# Patient Record
Sex: FEMALE | Race: OTHER | HISPANIC OR LATINO
[De-identification: names, ages, dates, MRNs, and addresses within clinical notes are randomized per-mention and may not be internally consistent; named-entity substitution may affect disease eponyms.]

---

## 2017-02-14 ENCOUNTER — APPOINTMENT (OUTPATIENT)
Dept: CARDIOLOGY | Facility: HOSPITAL | Age: 67
End: 2017-02-14

## 2017-02-14 ENCOUNTER — NON-APPOINTMENT (OUTPATIENT)
Age: 67
End: 2017-02-14

## 2017-02-28 ENCOUNTER — APPOINTMENT (OUTPATIENT)
Dept: CV DIAGNOSTICS | Facility: HOSPITAL | Age: 67
End: 2017-02-28

## 2017-03-28 ENCOUNTER — APPOINTMENT (OUTPATIENT)
Dept: CARDIOLOGY | Facility: HOSPITAL | Age: 67
End: 2017-03-28

## 2017-07-19 ENCOUNTER — APPOINTMENT (OUTPATIENT)
Dept: CV DIAGNOSTICS | Facility: HOSPITAL | Age: 67
End: 2017-07-19

## 2017-07-25 ENCOUNTER — APPOINTMENT (OUTPATIENT)
Dept: CARDIOLOGY | Facility: HOSPITAL | Age: 67
End: 2017-07-25

## 2017-07-25 VITALS
DIASTOLIC BLOOD PRESSURE: 90 MMHG | WEIGHT: 195 LBS | BODY MASS INDEX: 31.47 KG/M2 | RESPIRATION RATE: 15 BRPM | OXYGEN SATURATION: 98 % | SYSTOLIC BLOOD PRESSURE: 160 MMHG | HEART RATE: 48 BPM

## 2017-07-25 DIAGNOSIS — R00.1 BRADYCARDIA, UNSPECIFIED: ICD-10-CM

## 2017-10-24 ENCOUNTER — APPOINTMENT (OUTPATIENT)
Dept: CARDIOLOGY | Facility: HOSPITAL | Age: 67
End: 2017-10-24

## 2017-10-24 ENCOUNTER — NON-APPOINTMENT (OUTPATIENT)
Age: 67
End: 2017-10-24

## 2017-10-24 VITALS — SYSTOLIC BLOOD PRESSURE: 178 MMHG | DIASTOLIC BLOOD PRESSURE: 82 MMHG

## 2017-10-24 VITALS
SYSTOLIC BLOOD PRESSURE: 192 MMHG | WEIGHT: 196 LBS | RESPIRATION RATE: 16 BRPM | OXYGEN SATURATION: 98 % | HEART RATE: 63 BPM | DIASTOLIC BLOOD PRESSURE: 95 MMHG

## 2017-11-14 ENCOUNTER — APPOINTMENT (OUTPATIENT)
Dept: CARDIOLOGY | Facility: HOSPITAL | Age: 67
End: 2017-11-14

## 2017-12-05 ENCOUNTER — APPOINTMENT (OUTPATIENT)
Dept: CARDIOLOGY | Facility: HOSPITAL | Age: 67
End: 2017-12-05

## 2017-12-05 VITALS
SYSTOLIC BLOOD PRESSURE: 184 MMHG | BODY MASS INDEX: 31.8 KG/M2 | HEART RATE: 52 BPM | OXYGEN SATURATION: 98 % | RESPIRATION RATE: 15 BRPM | WEIGHT: 197 LBS | DIASTOLIC BLOOD PRESSURE: 76 MMHG

## 2017-12-05 DIAGNOSIS — R73.03 PREDIABETES.: ICD-10-CM

## 2017-12-05 RX ORDER — CARVEDILOL 6.25 MG/1
6.25 TABLET, FILM COATED ORAL TWICE DAILY
Qty: 60 | Refills: 3 | Status: DISCONTINUED | COMMUNITY
Start: 2017-10-24 | End: 2017-12-05

## 2017-12-12 ENCOUNTER — OTHER (OUTPATIENT)
Age: 67
End: 2017-12-12

## 2017-12-12 ENCOUNTER — APPOINTMENT (OUTPATIENT)
Dept: CARDIOLOGY | Facility: HOSPITAL | Age: 67
End: 2017-12-12

## 2017-12-14 ENCOUNTER — OUTPATIENT (OUTPATIENT)
Dept: OUTPATIENT SERVICES | Facility: HOSPITAL | Age: 67
LOS: 1 days | End: 2017-12-14

## 2017-12-14 ENCOUNTER — APPOINTMENT (OUTPATIENT)
Dept: CV DIAGNOSTICS | Facility: HOSPITAL | Age: 67
End: 2017-12-14
Payer: MEDICARE

## 2017-12-14 DIAGNOSIS — I44.7 LEFT BUNDLE-BRANCH BLOCK, UNSPECIFIED: ICD-10-CM

## 2017-12-14 PROCEDURE — 93016 CV STRESS TEST SUPVJ ONLY: CPT | Mod: GC

## 2017-12-14 PROCEDURE — 78452 HT MUSCLE IMAGE SPECT MULT: CPT | Mod: 26

## 2017-12-14 PROCEDURE — 93018 CV STRESS TEST I&R ONLY: CPT | Mod: GC

## 2018-01-16 ENCOUNTER — APPOINTMENT (OUTPATIENT)
Dept: CARDIOLOGY | Facility: HOSPITAL | Age: 68
End: 2018-01-16

## 2018-01-16 VITALS
BODY MASS INDEX: 31.64 KG/M2 | OXYGEN SATURATION: 98 % | WEIGHT: 196 LBS | SYSTOLIC BLOOD PRESSURE: 198 MMHG | DIASTOLIC BLOOD PRESSURE: 74 MMHG | RESPIRATION RATE: 14 BRPM | HEART RATE: 50 BPM

## 2018-01-16 DIAGNOSIS — E78.5 HYPERLIPIDEMIA, UNSPECIFIED: ICD-10-CM

## 2018-01-16 DIAGNOSIS — I10 ESSENTIAL (PRIMARY) HYPERTENSION: ICD-10-CM

## 2018-01-16 DIAGNOSIS — I44.7 LEFT BUNDLE-BRANCH BLOCK, UNSPECIFIED: ICD-10-CM

## 2018-01-16 RX ORDER — CARVEDILOL 6.25 MG/1
6.25 TABLET, FILM COATED ORAL TWICE DAILY
Qty: 60 | Refills: 3 | Status: ACTIVE | COMMUNITY
Start: 2017-10-24 | End: 1900-01-01

## 2018-01-16 RX ORDER — LOSARTAN POTASSIUM 100 MG/1
100 TABLET, FILM COATED ORAL DAILY
Qty: 30 | Refills: 3 | Status: ACTIVE | COMMUNITY
Start: 2017-10-24 | End: 1900-01-01

## 2018-01-16 RX ORDER — HYDROCHLOROTHIAZIDE 25 MG/1
25 TABLET ORAL DAILY
Qty: 30 | Refills: 3 | Status: ACTIVE | COMMUNITY
Start: 2017-10-24 | End: 1900-01-01

## 2018-03-13 ENCOUNTER — APPOINTMENT (OUTPATIENT)
Dept: CARDIOLOGY | Facility: HOSPITAL | Age: 68
End: 2018-03-13

## 2018-04-03 ENCOUNTER — APPOINTMENT (OUTPATIENT)
Dept: CARDIOLOGY | Facility: HOSPITAL | Age: 68
End: 2018-04-03

## 2018-05-02 ENCOUNTER — INPATIENT (INPATIENT)
Facility: HOSPITAL | Age: 68
LOS: 6 days | Discharge: HOME | End: 2018-05-09
Attending: INTERNAL MEDICINE | Admitting: INTERNAL MEDICINE
Payer: MEDICARE

## 2018-05-02 VITALS
OXYGEN SATURATION: 100 % | HEART RATE: 60 BPM | SYSTOLIC BLOOD PRESSURE: 130 MMHG | DIASTOLIC BLOOD PRESSURE: 47 MMHG | TEMPERATURE: 98 F | RESPIRATION RATE: 18 BRPM

## 2018-05-02 DIAGNOSIS — D72.829 ELEVATED WHITE BLOOD CELL COUNT, UNSPECIFIED: ICD-10-CM

## 2018-05-02 DIAGNOSIS — D64.9 ANEMIA, UNSPECIFIED: ICD-10-CM

## 2018-05-02 DIAGNOSIS — M54.2 CERVICALGIA: ICD-10-CM

## 2018-05-02 DIAGNOSIS — Z98.890 OTHER SPECIFIED POSTPROCEDURAL STATES: Chronic | ICD-10-CM

## 2018-05-02 DIAGNOSIS — Z29.9 ENCOUNTER FOR PROPHYLACTIC MEASURES, UNSPECIFIED: ICD-10-CM

## 2018-05-02 DIAGNOSIS — I25.10 ATHEROSCLEROTIC HEART DISEASE OF NATIVE CORONARY ARTERY WITHOUT ANGINA PECTORIS: ICD-10-CM

## 2018-05-02 DIAGNOSIS — D64.89 OTHER SPECIFIED ANEMIAS: ICD-10-CM

## 2018-05-02 DIAGNOSIS — I10 ESSENTIAL (PRIMARY) HYPERTENSION: ICD-10-CM

## 2018-05-02 LAB
ALBUMIN SERPL ELPH-MCNC: 3.9 G/DL — SIGNIFICANT CHANGE UP (ref 3.3–5)
ALP SERPL-CCNC: 75 U/L — SIGNIFICANT CHANGE UP (ref 40–120)
ALT FLD-CCNC: 21 U/L — SIGNIFICANT CHANGE UP (ref 4–33)
ANISOCYTOSIS BLD QL: SLIGHT — SIGNIFICANT CHANGE UP
APPEARANCE UR: CLEAR — SIGNIFICANT CHANGE UP
AST SERPL-CCNC: 32 U/L — SIGNIFICANT CHANGE UP (ref 4–32)
BASOPHILS # BLD AUTO: 0.11 K/UL — SIGNIFICANT CHANGE UP (ref 0–0.2)
BASOPHILS NFR BLD AUTO: 0.7 % — SIGNIFICANT CHANGE UP (ref 0–2)
BASOPHILS NFR SPEC: 0.9 % — SIGNIFICANT CHANGE UP (ref 0–2)
BILIRUB DIRECT SERPL-MCNC: 0.3 MG/DL — HIGH (ref 0.1–0.2)
BILIRUB SERPL-MCNC: 2.9 MG/DL — HIGH (ref 0.2–1.2)
BILIRUB UR-MCNC: NEGATIVE — SIGNIFICANT CHANGE UP
BLASTS # FLD: 0 % — SIGNIFICANT CHANGE UP (ref 0–0)
BLD GP AB SCN SERPL QL: NEGATIVE — SIGNIFICANT CHANGE UP
BLOOD UR QL VISUAL: NEGATIVE — SIGNIFICANT CHANGE UP
BUN SERPL-MCNC: 16 MG/DL — SIGNIFICANT CHANGE UP (ref 7–23)
CALCIUM SERPL-MCNC: 9.1 MG/DL — SIGNIFICANT CHANGE UP (ref 8.4–10.5)
CHLORIDE SERPL-SCNC: 104 MMOL/L — SIGNIFICANT CHANGE UP (ref 98–107)
CO2 SERPL-SCNC: 26 MMOL/L — SIGNIFICANT CHANGE UP (ref 22–31)
COLOR SPEC: SIGNIFICANT CHANGE UP
CREAT SERPL-MCNC: 0.8 MG/DL — SIGNIFICANT CHANGE UP (ref 0.5–1.3)
DAT C3-SP REAG RBC QL: NEGATIVE — SIGNIFICANT CHANGE UP
DAT POLY-SP REAG RBC QL: POSITIVE — SIGNIFICANT CHANGE UP
DIRECT COOMBS IGG: POSITIVE — SIGNIFICANT CHANGE UP
ELUATE ANTIBODY 1: SIGNIFICANT CHANGE UP
EOSINOPHIL # BLD AUTO: 1.06 K/UL — HIGH (ref 0–0.5)
EOSINOPHIL NFR BLD AUTO: 6.4 % — HIGH (ref 0–6)
EOSINOPHIL NFR FLD: 7.5 % — HIGH (ref 0–6)
ERYTHROCYTE [SEDIMENTATION RATE] IN BLOOD: 123 MM/HR — HIGH (ref 4–25)
FERRITIN SERPL-MCNC: 488.2 NG/ML — HIGH (ref 15–150)
FIBRINOGEN PPP-MCNC: 473.6 MG/DL — SIGNIFICANT CHANGE UP (ref 310–510)
FOLATE SERPL-MCNC: 16.1 NG/ML — SIGNIFICANT CHANGE UP (ref 4.7–20)
GIANT PLATELETS BLD QL SMEAR: PRESENT — SIGNIFICANT CHANGE UP
GLUCOSE SERPL-MCNC: 105 MG/DL — HIGH (ref 70–99)
GLUCOSE UR-MCNC: NEGATIVE — SIGNIFICANT CHANGE UP
HAPTOGLOB SERPL-MCNC: < 20 MG/DL — LOW (ref 34–200)
HCT VFR BLD CALC: 18 % — CRITICAL LOW (ref 34.5–45)
HCT VFR BLD CALC: 18.8 % — CRITICAL LOW (ref 34.5–45)
HGB BLD-MCNC: 6.3 G/DL — CRITICAL LOW (ref 11.5–15.5)
HGB BLD-MCNC: 6.5 G/DL — CRITICAL LOW (ref 11.5–15.5)
HYPOCHROMIA BLD QL: SLIGHT — SIGNIFICANT CHANGE UP
IMM GRANULOCYTES # BLD AUTO: 1.13 # — SIGNIFICANT CHANGE UP
IMM GRANULOCYTES NFR BLD AUTO: 6.9 % — HIGH (ref 0–1.5)
IRON SATN MFR SERPL: 191 UG/DL — HIGH (ref 30–160)
IRON SATN MFR SERPL: 264 UG/DL — SIGNIFICANT CHANGE UP (ref 140–530)
KETONES UR-MCNC: NEGATIVE — SIGNIFICANT CHANGE UP
LDH SERPL L TO P-CCNC: 500 U/L — HIGH (ref 135–225)
LEUKOCYTE ESTERASE UR-ACNC: NEGATIVE — SIGNIFICANT CHANGE UP
LYMPHOCYTES # BLD AUTO: 20.7 % — SIGNIFICANT CHANGE UP (ref 13–44)
LYMPHOCYTES # BLD AUTO: 3.42 K/UL — HIGH (ref 1–3.3)
LYMPHOCYTES NFR SPEC AUTO: 18.7 % — SIGNIFICANT CHANGE UP (ref 13–44)
MACROCYTES BLD QL: SLIGHT — SIGNIFICANT CHANGE UP
MCHC RBC-ENTMCNC: 30.5 PG — SIGNIFICANT CHANGE UP (ref 27–34)
MCHC RBC-ENTMCNC: 31.4 PG — SIGNIFICANT CHANGE UP (ref 27–34)
MCHC RBC-ENTMCNC: 34.6 % — SIGNIFICANT CHANGE UP (ref 32–36)
MCHC RBC-ENTMCNC: 35.7 % — SIGNIFICANT CHANGE UP (ref 32–36)
MCV RBC AUTO: 87.9 FL — SIGNIFICANT CHANGE UP (ref 80–100)
MCV RBC AUTO: 88.3 FL — SIGNIFICANT CHANGE UP (ref 80–100)
METAMYELOCYTES # FLD: 0 % — SIGNIFICANT CHANGE UP (ref 0–1)
MICROCYTES BLD QL: SLIGHT — SIGNIFICANT CHANGE UP
MONOCYTES # BLD AUTO: 1.08 K/UL — HIGH (ref 0–0.9)
MONOCYTES NFR BLD AUTO: 6.5 % — SIGNIFICANT CHANGE UP (ref 2–14)
MONOCYTES NFR BLD: 4.7 % — SIGNIFICANT CHANGE UP (ref 2–9)
MYELOCYTES NFR BLD: 0.9 % — HIGH (ref 0–0)
NEUTROPHIL AB SER-ACNC: 66.4 % — SIGNIFICANT CHANGE UP (ref 43–77)
NEUTROPHILS # BLD AUTO: 9.69 K/UL — HIGH (ref 1.8–7.4)
NEUTROPHILS NFR BLD AUTO: 58.8 % — SIGNIFICANT CHANGE UP (ref 43–77)
NEUTS BAND # BLD: 0.9 % — SIGNIFICANT CHANGE UP (ref 0–6)
NITRITE UR-MCNC: NEGATIVE — SIGNIFICANT CHANGE UP
NON-SQ EPI CELLS # UR AUTO: <1 — SIGNIFICANT CHANGE UP
NRBC # FLD: 1.86 — SIGNIFICANT CHANGE UP
NRBC # FLD: 2.28 — SIGNIFICANT CHANGE UP
NRBC FLD-RTO: 11.3 — SIGNIFICANT CHANGE UP
NRBC FLD-RTO: 13.5 — SIGNIFICANT CHANGE UP
OB PNL STL: NEGATIVE — SIGNIFICANT CHANGE UP
OTHER - HEMATOLOGY %: 0 — SIGNIFICANT CHANGE UP
PH UR: 7.5 — SIGNIFICANT CHANGE UP (ref 4.6–8)
PLATELET # BLD AUTO: 384 K/UL — SIGNIFICANT CHANGE UP (ref 150–400)
PLATELET # BLD AUTO: 385 K/UL — SIGNIFICANT CHANGE UP (ref 150–400)
PLATELET COUNT - ESTIMATE: NORMAL — SIGNIFICANT CHANGE UP
PMV BLD: 9.1 FL — SIGNIFICANT CHANGE UP (ref 7–13)
PMV BLD: 9.1 FL — SIGNIFICANT CHANGE UP (ref 7–13)
POLYCHROMASIA BLD QL SMEAR: SLIGHT — SIGNIFICANT CHANGE UP
POTASSIUM SERPL-MCNC: 3.6 MMOL/L — SIGNIFICANT CHANGE UP (ref 3.5–5.3)
POTASSIUM SERPL-SCNC: 3.6 MMOL/L — SIGNIFICANT CHANGE UP (ref 3.5–5.3)
PROMYELOCYTES # FLD: 0 % — SIGNIFICANT CHANGE UP (ref 0–0)
PROT SERPL-MCNC: 7 G/DL — SIGNIFICANT CHANGE UP (ref 6–8.3)
PROT UR-MCNC: NEGATIVE MG/DL — SIGNIFICANT CHANGE UP
RBC # BLD: 2.07 M/UL — LOW (ref 3.8–5.2)
RBC # BLD: 2.13 M/UL — LOW (ref 3.8–5.2)
RBC # FLD: 22.8 % — HIGH (ref 10.3–14.5)
RBC # FLD: 23 % — HIGH (ref 10.3–14.5)
RETICS #: 307 K/UL — HIGH (ref 25–125)
RETICS/RBC NFR: 14.6 % — HIGH (ref 0.5–2.5)
REVIEW TO FOLLOW: YES — SIGNIFICANT CHANGE UP
RH IG SCN BLD-IMP: POSITIVE — SIGNIFICANT CHANGE UP
RH IG SCN BLD-IMP: POSITIVE — SIGNIFICANT CHANGE UP
SMUDGE CELLS # BLD: PRESENT — SIGNIFICANT CHANGE UP
SODIUM SERPL-SCNC: 142 MMOL/L — SIGNIFICANT CHANGE UP (ref 135–145)
SP GR SPEC: 1.01 — SIGNIFICANT CHANGE UP (ref 1–1.04)
SQUAMOUS # UR AUTO: SIGNIFICANT CHANGE UP
TSH SERPL-MCNC: 3.1 UIU/ML — SIGNIFICANT CHANGE UP (ref 0.27–4.2)
UIBC SERPL-MCNC: 73.3 UG/DL — LOW (ref 110–370)
URATE SERPL-MCNC: 7 MG/DL — SIGNIFICANT CHANGE UP (ref 2.5–7)
UROBILINOGEN FLD QL: NORMAL MG/DL — SIGNIFICANT CHANGE UP
VARIANT LYMPHS # BLD: 0 % — SIGNIFICANT CHANGE UP
VIT B12 SERPL-MCNC: 596 PG/ML — SIGNIFICANT CHANGE UP (ref 200–900)
WBC # BLD: 16.49 K/UL — HIGH (ref 3.8–10.5)
WBC # BLD: 16.86 K/UL — HIGH (ref 3.8–10.5)
WBC # FLD AUTO: 16.49 K/UL — HIGH (ref 3.8–10.5)
WBC # FLD AUTO: 16.86 K/UL — HIGH (ref 3.8–10.5)

## 2018-05-02 PROCEDURE — 99223 1ST HOSP IP/OBS HIGH 75: CPT | Mod: GC

## 2018-05-02 PROCEDURE — 71046 X-RAY EXAM CHEST 2 VIEWS: CPT | Mod: 26

## 2018-05-02 PROCEDURE — 86077 PHYS BLOOD BANK SERV XMATCH: CPT

## 2018-05-02 PROCEDURE — 99223 1ST HOSP IP/OBS HIGH 75: CPT | Mod: AI,GC

## 2018-05-02 RX ORDER — LOSARTAN POTASSIUM 100 MG/1
50 TABLET, FILM COATED ORAL DAILY
Qty: 0 | Refills: 0 | Status: DISCONTINUED | OUTPATIENT
Start: 2018-05-02 | End: 2018-05-04

## 2018-05-02 RX ORDER — FENOFIBRATE,MICRONIZED 130 MG
145 CAPSULE ORAL DAILY
Qty: 0 | Refills: 0 | Status: DISCONTINUED | OUTPATIENT
Start: 2018-05-02 | End: 2018-05-09

## 2018-05-02 RX ADMIN — Medication 90 MILLIGRAM(S): at 23:21

## 2018-05-02 NOTE — H&P ADULT - NSHPSOCIALHISTORY_GEN_ALL_CORE
Denies drug use, EtoH use, never smoker. Pt lives with son and . Pt. is retired. Denies drug use, EtoH use, never smoker. Pt lives with son and . Pt. is retired. Born in [place] since 1985.

## 2018-05-02 NOTE — H&P ADULT - ATTENDING COMMENTS
Clinical picture suggestive of hemolytic anemia. D/W Hem consult, will send off all anemia work up, Coom's profile. Will start Prednisone 1mg/kg PO Daily and give 1 unit of PRBC as per Hem rec.

## 2018-05-02 NOTE — H&P ADULT - HISTORY OF PRESENT ILLNESS
68 yo f with PMHx of HTN, CAD, GERD c/o 3 months of left sided ear fullness and tinnitus.  Also c/o few days of bilateral hand and feet numbness and tingling.  Pt also noted fatigue when exerting herself recently.  Pain around left ear radiating to left neck and mid upper back.  No cp, no sob, no headache, no nausea, no vomiting, no weight loss, no trauma, no abd pain, no black stools, no back pain, no bleeding. [place] , [id number]: 66 yo Burkinan speaking female with PMHx of HTN, CAD, GERD c/o 3 months of left sided ear fullness and tinnitus.  Patient was poor historian. Tinnitus was described as a continuous hissing sound that has been getting worse 2 days prior to admission, leading the patient to present to the ED. Patient also complains of 3 months of 8/10, sharp neck pain along the mid-spine radiating down to her hip and 10/10 sharp leg pain for the past three months.  Patient also complains of "being hot" on Monday and Tuesday and c/o few days of bilateral hand and feet numbness and tingling.  Pt also noted fatigue when exerting herself recently.  Pain around left ear radiating to left neck and mid upper back.  No cp, no sob, no headache, no nausea, no vomiting, no weight loss, no trauma, no abd pain, no black stools, no back pain, no bleeding. Patient is an immigrant but did not describe recent travel or sick contacts. Last saw PCP on March 9th (Dr. Giles Bates), patient bloodwork January 2018 was within normal limits.    In ED,Vital Signs Last 24 Hrs  T(C): 36.4 (02 May 2018 12:28), Max: 36.7 (02 May 2018 09:33)  T(F): 97.6 (02 May 2018 12:28), Max: 98.1 (02 May 2018 09:33)  HR: 71 (02 May 2018 12:28) (60 - 71)  BP: 151/58 (02 May 2018 12:28) (130/47 - 151/58)  RR: 18 (02 May 2018 12:28) (18 - 18)  SpO2: 99% (02 May 2018 12:28) (99% - 100%)    Patient was not able to tolerate CT or blood transfusion due to anxiety and fear of contamination of blood.

## 2018-05-02 NOTE — ED PROVIDER NOTE - MEDICAL DECISION MAKING DETAILS
Pt with left ear tinnitus and upper back pain with neuro complaints but normal exam except easy fatigue.  Labs show anemia - transfuse and admit for further workup, discussed with Dr. Jc.

## 2018-05-02 NOTE — H&P ADULT - ASSESSMENT
68 yo Azeri speaking female with PMHx of HTN, CAD, GERD c/o 3 months of left sided ear fullness and tinnitus, found to have anemia on blood work in ED

## 2018-05-02 NOTE — H&P ADULT - PROBLEM SELECTOR PLAN 2
Pt found to have leukocytosis, no s/s of infections, pt afebrile  - will trend cbc, continue to monitor  - if patient spikes fever will obtain blood cultures

## 2018-05-02 NOTE — CONSULT NOTE ADULT - ASSESSMENT
68 yo f with PMHx of HTN, CAD, GERD c/o 4 months of left sided ear pain associated with high pitched ringing. Hematology consulted for anemia.

## 2018-05-02 NOTE — H&P ADULT - PMH
CAD (coronary artery disease)    HTN (Hypertension)    Vertigo CAD (coronary artery disease)    HTN (Hypertension)    Mixed hyperlipidemia    Vertigo

## 2018-05-02 NOTE — ED ADULT NURSE NOTE - PAIN: BODY LOCATION
glasses/Partially impaired: cannot see medication labels or newsprint, but can see obstacles in path, and the surrounding layout; can count fingers at arm's length
generalized

## 2018-05-02 NOTE — ED PROVIDER NOTE - PROGRESS NOTE DETAILS
Hg rechecked at 6.  Pt NEEDS blood transfusion, discussed with pt with 2 different translators.  Pt states she is scared and refusing.  Explained low chance of infection but still not willing.  CT scan cancelled and will admit to medicine.

## 2018-05-02 NOTE — H&P ADULT - NSHPOUTPATIENTPROVIDERS_GEN_ALL_CORE
Dr. Middleton (Cardiology) [phone]  Dr Jc, [place]  Dr. Middleton (Cardiology) [phone]  Dr. Giles Bates MD [phone]

## 2018-05-02 NOTE — H&P ADULT - PROBLEM SELECTOR PLAN 1
Pt found to be anemic to 6.5 from 12.0 on Jan 18, per PCP.  Pt. labs with elevated total bilirubin  - f/u Iron studies, Retic count, LDH, uric acid, fibrinogen, haptoglobin, chelsey, B12, folate, TSH  - Heme consult  - Will hold off on transfusion, given patient HDS, no acute blood loss Pt found to be anemic to 6.5 from 12.0 on Jan 18, per PCP.  Pt. labs with elevated total bilirubin  - f/u Iron studies, Retic count, LDH, uric acid, fibrinogen, haptoglobin, chelsey, B12, folate, TSH  - Heme consult - Prednisone 80mg  - Transfuse goal >7, s/p 1u PRBC

## 2018-05-02 NOTE — H&P ADULT - FAMILY HISTORY
No pertinent family history in first degree relatives Father  Still living? Unknown  Family history of hypertension in father, Age at diagnosis: Age Unknown  Family history of diabetes mellitus in father, Age at diagnosis: Age Unknown  Family history of ischemic heart disease, Age at diagnosis: Age Unknown     Sibling  Still living? Unknown  Family history of breast cancer, Age at diagnosis: Age Unknown  Family history of malignant neoplasm of uterus, Age at diagnosis: Age Unknown

## 2018-05-02 NOTE — ED ADULT TRIAGE NOTE - CHIEF COMPLAINT QUOTE
Through : patient reports weakness and dizziness and noise in her eyes describes as a ringing/ buzzing in her left ear. only PMH of HTN and gastritis.

## 2018-05-02 NOTE — H&P ADULT - NSHPREVIEWOFSYSTEMS_GEN_ALL_CORE
REVIEW OF SYSTEMS:    CONSTITUTIONAL: No weakness, fevers or chills  EYES/ENT: No visual changes;  No vertigo or throat pain   NECK: No pain or stiffness  RESPIRATORY: No cough, wheezing, hemoptysis; No shortness of breath  CARDIOVASCULAR: No chest pain or palpitations  GASTROINTESTINAL: No abdominal or epigastric pain. No nausea, vomiting, or hematemesis; No diarrhea or constipation. No melena or hematochezia.  GENITOURINARY: No dysuria, frequency or hematuria  NEUROLOGICAL: No numbness or weakness  SKIN: No itching, rashes REVIEW OF SYSTEMS:    CONSTITUTIONAL: No fevers or chills, + fatigue  EYES/ENT: No visual changes, no throat pain;  + vertigo, + tinnitus left ear  NECK: + neck pain ,No stiffness  RESPIRATORY: No cough, wheezing, hemoptysis; No shortness of breath  CARDIOVASCULAR: No chest pain or palpitations  GASTROINTESTINAL: No abdominal or epigastric pain. No nausea, vomiting, or hematemesis; No diarrhea or constipation. No melena or hematochezia.  GENITOURINARY: No dysuria, frequency or hematuria  MSK: hip pain with walking  NEUROLOGICAL: No numbness or weakness  SKIN: No itching, rashes

## 2018-05-02 NOTE — CONSULT NOTE ADULT - SUBJECTIVE AND OBJECTIVE BOX
Hematology Consult Note ******INCOMPLETE    HPI:  Used ([id number])  68 yo f with PMHx of HTN, CAD, GERD c/o 4 months of left sided ear pain associated with high pitched ringing. She denies decreased hearing and vision changes. With the tinnitus she notes left sided facial/neck pain. ROS is also notable for lightheadedness on exertion. She denies fever/chills, rash, nausea/vomiting, unintentional weight loss. No recent travel though she does report being diagnosed with malaria when she was living in [place]. No jaundice. Medications reviewed, no OTC meds, herbs or vitamins. She takes HCTZ carvedilol, losartan and omeprazole regularly. She denies a family history of blood disorders.     PAST MEDICAL & SURGICAL HISTORY:  CAD (coronary artery disease)  Vertigo  HTN (Hypertension)  No significant past surgical history      FAMILY HISTORY:  CAD and HTN in her father.      MEDICATIONS  (STANDING):    MEDICATIONS  (PRN):      Allergies    No Known Allergies    Intolerances        SOCIAL HISTORY: denies ETOH or active tobacco use. from [place]    REVIEW OF SYSTEMS:    CONSTITUTIONAL: weakness  EYES/ENT: No visual changes;  left face/ear/neck pain  NECK: pain down left side  RESPIRATORY: No cough, wheezing; No shortness of breath  CARDIOVASCULAR: intermittent chest discomfort  GASTROINTESTINAL: No abdominal or epigastric pain. No nausea, vomiting, or hematemesis; No diarrhea or constipation. No melena or hematochezia.  GENITOURINARY: No dysuria, frequency or hematuria  NEUROLOGICAL: No numbness or weakness  SKIN: No itching, burning, rashes, or lesions   All other review of systems is negative unless indicated above.        T(F): 97.6 (05-02-18 @ 12:28), Max: 98.1 (05-02-18 @ 09:33)  HR: 71 (05-02-18 @ 12:28)  BP: 151/58 (05-02-18 @ 12:28)  RR: 18 (05-02-18 @ 12:28)  SpO2: 99% (05-02-18 @ 12:28)  Wt(kg): --    GENERAL: NAD, well-developed, sitting comfortably in a chair  HEAD:  Atraumatic, Normocephalic  EYES: EOMI, PERRLA, conjunctiva and sclera clear  NECK: Supple, No LAD  CHEST/LUNG: Clear to auscultation bilaterally; No wheeze  HEART: Regular rate and rhythm; No murmurs, rubs, or gallops  ABDOMEN: Soft, Nontender, Nondistended; Bowel sounds present  EXTREMITIES:  2+ Peripheral Pulses, No clubbing, cyanosis, or edema  NEUROLOGY: non-focal  SKIN: No rashes or lesions. No jaundice                          6.5    16.86 )-----------( 384      ( 02 May 2018 12:20 )             18.8       05-02    142  |  104  |  16  ----------------------------<  105<H>  3.6   |  26  |  0.80    Ca    9.1      02 May 2018 10:50    TPro  7.0  /  Alb  3.9  /  TBili  2.9<H>  /  DBili  x   /  AST  32  /  ALT  21  /  AlkPhos  75  05-02 Hematology Consult Note    HPI:  Used ([id number])  66 yo f with PMHx of HTN, CAD, GERD c/o 4 months of left sided ear pain associated with high pitched ringing. She denies decreased hearing and vision changes. With the tinnitus she notes left sided facial/neck pain. ROS is also notable for lightheadedness on exertion. She denies fever/chills, rash, nausea/vomiting, unintentional weight loss. No recent travel though she does report being diagnosed with malaria when she was living in [place]. No jaundice. Medications reviewed, no OTC meds, herbs or vitamins. She takes HCTZ carvedilol, losartan and omeprazole regularly. She denies a family history of blood disorders.     PAST MEDICAL & SURGICAL HISTORY:  CAD (coronary artery disease)  Vertigo  HTN (Hypertension)  No significant past surgical history      FAMILY HISTORY:  CAD and HTN in her father.      MEDICATIONS  (STANDING):    MEDICATIONS  (PRN):      Allergies    No Known Allergies    Intolerances        SOCIAL HISTORY: denies ETOH or active tobacco use. from [place]    REVIEW OF SYSTEMS:    CONSTITUTIONAL: weakness  EYES/ENT: No visual changes;  left face/ear/neck pain  NECK: pain down left side  RESPIRATORY: No cough, wheezing; No shortness of breath  CARDIOVASCULAR: intermittent chest discomfort  GASTROINTESTINAL: No abdominal or epigastric pain. No nausea, vomiting, or hematemesis; No diarrhea or constipation. No melena or hematochezia.  GENITOURINARY: No dysuria, frequency or hematuria  NEUROLOGICAL: No numbness or weakness  SKIN: No itching, burning, rashes, or lesions   All other review of systems is negative unless indicated above.        T(F): 97.6 (05-02-18 @ 12:28), Max: 98.1 (05-02-18 @ 09:33)  HR: 71 (05-02-18 @ 12:28)  BP: 151/58 (05-02-18 @ 12:28)  RR: 18 (05-02-18 @ 12:28)  SpO2: 99% (05-02-18 @ 12:28)  Wt(kg): --    GENERAL: NAD, well-developed, sitting comfortably in a chair  HEAD:  Atraumatic, Normocephalic  EYES: EOMI, PERRLA, conjunctiva and sclera clear  NECK: Supple, No LAD  CHEST/LUNG: Clear to auscultation bilaterally; No wheeze  HEART: Regular rate and rhythm; No murmurs, rubs, or gallops  ABDOMEN: Soft, Nontender, Nondistended; Bowel sounds present  EXTREMITIES:  2+ Peripheral Pulses, No clubbing, cyanosis, or edema  NEUROLOGY: non-focal  SKIN: No rashes or lesions. No jaundice                          6.5    16.86 )-----------( 384      ( 02 May 2018 12:20 )             18.8       05-02    142  |  104  |  16  ----------------------------<  105<H>  3.6   |  26  |  0.80    Ca    9.1      02 May 2018 10:50    TPro  7.0  /  Alb  3.9  /  TBili  2.9<H>  /  DBili  x   /  AST  32  /  ALT  21  /  AlkPhos  75  05-02

## 2018-05-02 NOTE — CONSULT NOTE ADULT - PROBLEM SELECTOR RECOMMENDATION 9
Hb 6.9 with MCV 88. Prior labs wnl (2016). Diagnosis is broad, including hemolysis v issue with production (nucleated RBCs present).  -check hemolysis labs: retic, LDH, haptoglobin. await fractionation of bilirubin.  -check iron studies including ferritin  -will review peripheral smear.  -given ongoing symptoms likely related to her anemia, agree with plan for transfusion. Hb 6.9 with MCV 88. Prior labs wnl (2016). 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  -check iron studies including ferritin  -will review peripheral smear.  -given ongoing symptoms likely related to her anemia, agree with plan for transfusion. the warm AIHA should not preclude an appropriate response Hb 6.9 with MCV 88. Prior labs wnl (2016). 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  - iron studies including ferritin are pending  -will review peripheral smear.  - check HIV, flow cytometry (green top tube)  -given ongoing symptoms likely related to her anemia, agree with plan for transfusion. the warm AIHA should not preclude an appropriate response  - would check CT neck, chest, abd/pelvis to evaluate for underlying malignancy causing the AIHA. would also check BRUNO as a screen for other autoimmune conditions, (also noted to have elevated ESR on admission)  - medications reviewed, none known to be a/w AIHA Hb 6.9 with MCV 88. Prior labs wnl (2016). 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  - iron studies including ferritin are pending  -will review peripheral smear.  - check HIV, flow cytometry (green top tube)  -given ongoing symptoms likely related to her anemia, agree with plan for transfusion. the warm AIHA should not preclude an appropriate response  - would check CT neck, chest, abd/pelvis to evaluate for underlying malignancy causing the AIHA. would also check BRUNO as a screen for other autoimmune conditions, (also noted to have elevated ESR on admission)  - medications reviewed, HCTZ is associated with an AIHA but unclear how common this is on review of the literature (rare case reports).

## 2018-05-02 NOTE — H&P ADULT - NSHPLABSRESULTS_GEN_ALL_CORE
6.5    16.86 )-----------( 384      ( 02 May 2018 12:20 )             18.8         142  |  104  |  16  ----------------------------<  105<H>  3.6   |  26  |  0.80    Ca    9.1      02 May 2018 10:50    TPro  7.0  /  Alb  3.9  /  TBili  2.9<H>  /  DBili  x   /  AST  32  /  ALT  21  /  AlkPhos  75  05-    CAPILLARY BLOOD GLUCOSE          Urinalysis Basic - ( 02 May 2018 10:50 )    Color: PLYEL / Appearance: CLEAR / S.007 / pH: 7.5  Gluc: NEGATIVE / Ketone: NEGATIVE  / Bili: NEGATIVE / Urobili: NORMAL mg/dL   Blood: NEGATIVE / Protein: NEGATIVE mg/dL / Nitrite: NEGATIVE   Leuk Esterase: NEGATIVE / RBC: x / WBC x   Sq Epi: OCC / Non Sq Epi: x / Bacteria: x    < from: Xray Chest 2 Views PA/Lat (18 @ 12:50) >    IMPRESSION:  Clear lungs. No pleural effusions or pneumothorax.    Slightly enlarged appearing cardiac and mediastinal silhouettes.     Trachea midline.    Unremarkable osseous structures.

## 2018-05-02 NOTE — H&P ADULT - PROBLEM SELECTOR PLAN 4
Pt currently HDS stable will hold BP meds in setting of low Hgb  - restart BP meds ( HCTZ, losartan, hydralizine, coreg) as tolerated

## 2018-05-02 NOTE — CONSULT NOTE ADULT - PROBLEM SELECTOR RECOMMENDATION 2
WBC 16.5 with increased monocytes, eosinophils and immature cells. concerning for reactive v primary process

## 2018-05-02 NOTE — ED PROVIDER NOTE - OBJECTIVE STATEMENT
[id number] .  67 yof with hx of HTN c/o 3 months of left sided ear fullness and tinnitus.  Also c/o few days of bilateral hand and feet numbness and tingling.  Pt also noted fatigue when exerting herself recently.  Pain around left ear radiating to left neck and mid upper back.  No cp, no sob, no headache, no nausea, no vomiting, no weight loss, no trauma, no abd pain, no black stools, no back pain, no bleeding.

## 2018-05-02 NOTE — H&P ADULT - NSHPPHYSICALEXAM_GEN_ALL_CORE
Vital Signs Last 24 Hrs  T(C): 36.4 (02 May 2018 12:28), Max: 36.7 (02 May 2018 09:33)  T(F): 97.6 (02 May 2018 12:28), Max: 98.1 (02 May 2018 09:33)  HR: 71 (02 May 2018 12:28) (60 - 71)  BP: 151/58 (02 May 2018 12:28) (130/47 - 151/58)  BP(mean): --  RR: 18 (02 May 2018 12:28) (18 - 18)  SpO2: 99% (02 May 2018 12:28) (99% - 100%)    PHYSICAL EXAM:  GENERAL: NAD, well-developed  HEAD:  Atraumatic, Normocephalic  EYES: EOMI, PERRLA, conjunctiva and sclera clear  NECK: Supple, No JVD  CHEST/LUNG: Clear to auscultation bilaterally; No wheeze  HEART: Regular rate and rhythm; No murmurs, rubs, or gallops  ABDOMEN: Soft, Nontender, Nondistended; Bowel sounds present  EXTREMITIES:  2+ Peripheral Pulses, No clubbing, cyanosis, or edema  PSYCH: AAOx3  NEUROLOGY: non-focal  SKIN: No rashes or lesions Vital Signs Last 24 Hrs  T(C): 36.4 (02 May 2018 12:28), Max: 36.7 (02 May 2018 09:33)  T(F): 97.6 (02 May 2018 12:28), Max: 98.1 (02 May 2018 09:33)  HR: 71 (02 May 2018 12:28) (60 - 71)  BP: 151/58 (02 May 2018 12:28) (130/47 - 151/58)  BP(mean): --  RR: 18 (02 May 2018 12:28) (18 - 18)  SpO2: 99% (02 May 2018 12:28) (99% - 100%)    PHYSICAL EXAM:  GENERAL: NAD, well-developed, amublating well  HEAD:  Atraumatic, Normocephalic  EYES: EOMI, PERRLA, conjunctiva and mild sclera icterus +  NECK: Supple, No JVD  CHEST/LUNG: Clear to auscultation bilaterally; No wheeze  HEART: Regular rate and rhythm; No murmurs, rubs, or gallops  ABDOMEN: Soft, Nontender, Nondistended; Bowel sounds present  EXTREMITIES:  2+ Peripheral Pulses, No clubbing, cyanosis, or edema  PSYCH: AAOx3  NEUROLOGY: non-focal  SKIN: No rashes or lesions Vital Signs Last 24 Hrs  T(C): 36.4 (02 May 2018 12:28), Max: 36.7 (02 May 2018 09:33)  T(F): 97.6 (02 May 2018 12:28), Max: 98.1 (02 May 2018 09:33)  HR: 71 (02 May 2018 12:28) (60 - 71)  BP: 151/58 (02 May 2018 12:28) (130/47 - 151/58)  BP(mean): --  RR: 18 (02 May 2018 12:28) (18 - 18)  SpO2: 99% (02 May 2018 12:28) (99% - 100%)    PHYSICAL EXAM:  GENERAL: NAD, well-developed, amublating well  HEAD:  Atraumatic, Normocephalic  EYES: EOMI, PERRLA, conjunctiva and mild sclera icterus +  NECK: Supple, No JVD  CHEST/LUNG: Clear to auscultation bilaterally; No wheeze  HEART: Regular rate and rhythm; No murmurs, rubs, or gallops  ABDOMEN: Soft, Nontender, Nondistended; Bowel sounds present  EXTREMITIES:  2+ Peripheral Pulses, No clubbing, cyanosis, or edema  PSYCH: AAOx3  NEUROLOGY: non-focal, focal neck tenderness AOx3  SKIN: No rashes or lesions

## 2018-05-02 NOTE — ED ADULT NURSE NOTE - OBJECTIVE STATEMENT
Patient received AA&Ox3 (Azerbaijani speaking but able to make needs know in English/primary RN able to communicate with pt.) c/o generalized weakness, periods of dizziness, ringing/buzzing to left ear, and pain to mid-back area. Patient denies chest pain, N/V, SOB, fever, chills, dyspnea, abdominal pain at this time. Patient ambulates at baseline, skin intact. 20g PIV in place to left AC, labs drawn, NAD noted - will continue to monitor.

## 2018-05-03 LAB
BASOPHILS # BLD AUTO: 0.13 K/UL — SIGNIFICANT CHANGE UP (ref 0–0.2)
BASOPHILS NFR BLD AUTO: 0.7 % — SIGNIFICANT CHANGE UP (ref 0–2)
BUN SERPL-MCNC: 18 MG/DL — SIGNIFICANT CHANGE UP (ref 7–23)
CALCIUM SERPL-MCNC: 9.1 MG/DL — SIGNIFICANT CHANGE UP (ref 8.4–10.5)
CHLORIDE SERPL-SCNC: 101 MMOL/L — SIGNIFICANT CHANGE UP (ref 98–107)
CO2 SERPL-SCNC: 25 MMOL/L — SIGNIFICANT CHANGE UP (ref 22–31)
CREAT SERPL-MCNC: 0.8 MG/DL — SIGNIFICANT CHANGE UP (ref 0.5–1.3)
EOSINOPHIL # BLD AUTO: 0.39 K/UL — SIGNIFICANT CHANGE UP (ref 0–0.5)
EOSINOPHIL NFR BLD AUTO: 2 % — SIGNIFICANT CHANGE UP (ref 0–6)
GLUCOSE SERPL-MCNC: 158 MG/DL — HIGH (ref 70–99)
HCT VFR BLD CALC: 22.3 % — LOW (ref 34.5–45)
HGB BLD-MCNC: 7.7 G/DL — LOW (ref 11.5–15.5)
HIV 1+2 AB+HIV1 P24 AG SERPL QL IA: SIGNIFICANT CHANGE UP
IMM GRANULOCYTES # BLD AUTO: 1.45 # — SIGNIFICANT CHANGE UP
IMM GRANULOCYTES NFR BLD AUTO: 7.6 % — HIGH (ref 0–1.5)
LYMPHOCYTES # BLD AUTO: 11.9 % — LOW (ref 13–44)
LYMPHOCYTES # BLD AUTO: 2.28 K/UL — SIGNIFICANT CHANGE UP (ref 1–3.3)
MAGNESIUM SERPL-MCNC: 1.9 MG/DL — SIGNIFICANT CHANGE UP (ref 1.6–2.6)
MCHC RBC-ENTMCNC: 30.9 PG — SIGNIFICANT CHANGE UP (ref 27–34)
MCHC RBC-ENTMCNC: 34.5 % — SIGNIFICANT CHANGE UP (ref 32–36)
MCV RBC AUTO: 89.6 FL — SIGNIFICANT CHANGE UP (ref 80–100)
MONOCYTES # BLD AUTO: 0.39 K/UL — SIGNIFICANT CHANGE UP (ref 0–0.9)
MONOCYTES NFR BLD AUTO: 2 % — SIGNIFICANT CHANGE UP (ref 2–14)
NEUTROPHILS # BLD AUTO: 14.54 K/UL — HIGH (ref 1.8–7.4)
NEUTROPHILS NFR BLD AUTO: 75.8 % — SIGNIFICANT CHANGE UP (ref 43–77)
NRBC # FLD: 2.84 — SIGNIFICANT CHANGE UP
NRBC FLD-RTO: 14.8 — SIGNIFICANT CHANGE UP
PHOSPHATE SERPL-MCNC: 2.7 MG/DL — SIGNIFICANT CHANGE UP (ref 2.5–4.5)
PLATELET # BLD AUTO: 390 K/UL — SIGNIFICANT CHANGE UP (ref 150–400)
PMV BLD: 9.6 FL — SIGNIFICANT CHANGE UP (ref 7–13)
POTASSIUM SERPL-MCNC: 3.6 MMOL/L — SIGNIFICANT CHANGE UP (ref 3.5–5.3)
POTASSIUM SERPL-SCNC: 3.6 MMOL/L — SIGNIFICANT CHANGE UP (ref 3.5–5.3)
RBC # BLD: 2.49 M/UL — LOW (ref 3.8–5.2)
RBC # FLD: 21.8 % — HIGH (ref 10.3–14.5)
SODIUM SERPL-SCNC: 140 MMOL/L — SIGNIFICANT CHANGE UP (ref 135–145)
WBC # BLD: 19.18 K/UL — HIGH (ref 3.8–10.5)
WBC # FLD AUTO: 19.18 K/UL — HIGH (ref 3.8–10.5)

## 2018-05-03 PROCEDURE — 99233 SBSQ HOSP IP/OBS HIGH 50: CPT | Mod: GC

## 2018-05-03 RX ORDER — CARVEDILOL PHOSPHATE 80 MG/1
6.25 CAPSULE, EXTENDED RELEASE ORAL EVERY 12 HOURS
Qty: 0 | Refills: 0 | Status: DISCONTINUED | OUTPATIENT
Start: 2018-05-03 | End: 2018-05-09

## 2018-05-03 RX ORDER — PANTOPRAZOLE SODIUM 20 MG/1
40 TABLET, DELAYED RELEASE ORAL
Qty: 0 | Refills: 0 | Status: DISCONTINUED | OUTPATIENT
Start: 2018-05-03 | End: 2018-05-09

## 2018-05-03 RX ADMIN — PANTOPRAZOLE SODIUM 40 MILLIGRAM(S): 20 TABLET, DELAYED RELEASE ORAL at 17:02

## 2018-05-03 RX ADMIN — Medication 90 MILLIGRAM(S): at 13:56

## 2018-05-03 RX ADMIN — LOSARTAN POTASSIUM 50 MILLIGRAM(S): 100 TABLET, FILM COATED ORAL at 05:12

## 2018-05-03 RX ADMIN — CARVEDILOL PHOSPHATE 6.25 MILLIGRAM(S): 80 CAPSULE, EXTENDED RELEASE ORAL at 17:02

## 2018-05-03 RX ADMIN — Medication 145 MILLIGRAM(S): at 13:57

## 2018-05-03 NOTE — PROGRESS NOTE ADULT - SUBJECTIVE AND OBJECTIVE BOX
CONTACT INFO:  Dale Resendiz MD  Internal Medicine PGY1  Pager:  [phone]/ [phone]    M-F 7am-7pm:  pager covered by day team     *Academic conferences M-F 8am-9am & 12pm-1pm - page ONLY if urgent or if consultant  M-F & Sa-Sun 7pm-7am:  NS  page 1443 for Teams 1-3, 1446 for Team 4 & Care Model/ [facility] Page [id number] for T 1-3 and 50004 for T4 &CM  Sa-Sun 7am-12pm:  see patient chart, primary physician assigned available 7am-12pm  Sa-Sun 12pm-7pm:  NS  page 1443 for Teams 1-4, [facility]  page Covering Resident    AUSTIN HAYNES  67y  Female    Patient is a 67y old  Female who presents with a chief complaint of Dizziness (02 May 2018 14:36)      INTERVAL HPI / OVERNIGHT EVENTS:      OBJECTIVE:  Telemetry (24 Hrs):     Vitals Signs (24 Hrs):  T(C): 36.8 (18 @ 05:10), Max: 37.8 (18 @ 19:34)  HR: 68 (18 @ 05:10) (57 - 74)  BP: 149/69 (18 @ 05:10) (129/60 - 171/70)  RR: 17 (18 @ 05:10) (16 - 18)  SpO2: 97% (18 @ 05:10) (97% - 100%)    PHYSICAL EXAM:  General: Comfortable, no apparent distress  HEENT: Atraumatic; EOMI, PERRLA, conjunctiva and sclera clear; no tonsillar erythema/exudates/enlargement  Neck: Supple; no JVD; thyroid normal without masses or enlargement  Chest/Lungs: Clear to auscultation B/L; no rales, rhonchi or wheezing  Heart: Regular rate and rhythm; normal S1/S2; no murmurs, rubs, or gallops  Abdomen: Soft, nontender, nondistended; bowel sounds present  Extremities: PT/DP pulses 2+ B/L; no edema  Skin: No rashes or lesions  Neurological: Alert and oriented to person/place/time    LABS:                        6.5    16.86 )-----------( 384      ( 02 May 2018 12:20 )             18.8     05-02    142  |  104  |  16  ----------------------------<  105<H>  3.6   |  26  |  0.80    Ca    9.1      02 May 2018 10:50    TPro  7.0  /  Alb  3.9  /  TBili  2.9<H>  /  DBili  0.3<H>  /  AST  32  /  ALT  21  /  AlkPhos  75  05-      Urinalysis Basic - ( 02 May 2018 10:50 )    Color: PLYEL / Appearance: CLEAR / S.007 / pH: 7.5  Gluc: NEGATIVE / Ketone: NEGATIVE  / Bili: NEGATIVE / Urobili: NORMAL mg/dL   Blood: NEGATIVE / Protein: NEGATIVE mg/dL / Nitrite: NEGATIVE   Leuk Esterase: NEGATIVE / RBC: x / WBC x   Sq Epi: OCC / Non Sq Epi: x / Bacteria: x      CAPILLARY BLOOD GLUCOSE              RADIOLOGY & ADDITIONAL TESTS:      MEDICATIONS:  fenofibrate Tablet 145 milliGRAM(s) Oral daily  losartan 50 milliGRAM(s) Oral daily      ALLERGIES:  No Known Allergies CONTACT INFO:  Dale Resendiz MD  Internal Medicine PGY1  Pager:  [phone]/ [phone]    M-F 7am-7pm:  pager covered by day team     *Academic conferences M-F 8am-9am & 12pm-1pm - page ONLY if urgent or if consultant  M-F & Sa-Sun 7pm-7am:  NS  page 1443 for Teams 1-3, 1446 for Team 4 & Care Model/ [facility] Page [id number] for T 1-3 and 91292 for T4 &CM  Sa-Sun 7am-12pm:  see patient chart, primary physician assigned available 7am-12pm  Sa-Sun 12pm-7pm:  NS  page 1443 for Teams 1-4, [facility]  page Covering Resident    AUSTIN HAYNES  67y  Female    Patient is a 67y old  Female who presents with a chief complaint of Dizziness (02 May 2018 14:36)      INTERVAL HPI / OVERNIGHT EVENTS: NAEON, Pt feels better, afebrile VSS. Pt. denies cp, sob, f/c, n/v.      OBJECTIVE:  Telemetry (24 Hrs):     Vitals Signs (24 Hrs):  T(C): 36.8 (18 @ 05:10), Max: 37.8 (18 @ 19:34)  HR: 68 (18 @ 05:10) (57 - 74)  BP: 149/69 (18 @ 05:10) (129/60 - 171/70)  RR: 17 (18 @ 05:10) (16 - 18)  SpO2: 97% (18 @ 05:10) (97% - 100%)    PHYSICAL EXAM:  GENERAL: NAD, well-developed, amublating well  HEAD:  Atraumatic, Normocephalic  EYES: EOMI, PERRLA, conjunctiva and mild sclera icterus +  NECK: Supple, No JVD  CHEST/LUNG: Clear to auscultation bilaterally; No wheeze  HEART: Regular rate and rhythm; No murmurs, rubs, or gallops  ABDOMEN: Soft, Nontender, Nondistended; Bowel sounds present  EXTREMITIES:  2+ Peripheral Pulses, No clubbing, cyanosis, or edema  PSYCH: AAOx3  NEUROLOGY: non-focal, focal neck tenderness AOx3  SKIN: No rashes or lesions    LABS:                        6.5    16.86 )-----------( 384      ( 02 May 2018 12:20 )             18.8     05-    142  |  104  |  16  ----------------------------<  105<H>  3.6   |  26  |  0.80    Ca    9.1      02 May 2018 10:50    TPro  7.0  /  Alb  3.9  /  TBili  2.9<H>  /  DBili  0.3<H>  /  AST  32  /  ALT  21  /  AlkPhos  75  05-      Urinalysis Basic - ( 02 May 2018 10:50 )    Color: PLYEL / Appearance: CLEAR / S.007 / pH: 7.5  Gluc: NEGATIVE / Ketone: NEGATIVE  / Bili: NEGATIVE / Urobili: NORMAL mg/dL   Blood: NEGATIVE / Protein: NEGATIVE mg/dL / Nitrite: NEGATIVE   Leuk Esterase: NEGATIVE / RBC: x / WBC x   Sq Epi: OCC / Non Sq Epi: x / Bacteria: x      CAPILLARY BLOOD GLUCOSE              RADIOLOGY & ADDITIONAL TESTS:      MEDICATIONS:  fenofibrate Tablet 145 milliGRAM(s) Oral daily  losartan 50 milliGRAM(s) Oral daily      ALLERGIES:  No Known Allergies

## 2018-05-03 NOTE — PROGRESS NOTE ADULT - PROBLEM SELECTOR PLAN 1
Pt found to be anemic to 6.5 from 12.0 on Jan 18, per PCP.  Pt. labs with elevated total bilirubin  - f/u Iron studies, Retic count, LDH, uric acid, fibrinogen, haptoglobin, chelsey, B12, folate, TSH  - Heme consult - Prednisone 80mg  - Transfuse goal >7, s/p 1u PRBC Pt found to be anemic to 6.5 from 12.0 on Jan 18, per PCP. Likely secondary warm AIHA, s/p 1u PRBC repeat CBC 7.7  Pt. labs with elevated total bilirubin  - f/u Iron studies, Retic count, LDH, uric acid, fibrinogen, haptoglobin, chelsey, B12, folate, TSH  - Heme consult - Prednisone 90mg daily  - Transfuse goal >7, s/p 1u PRBC Pt found to be anemic to 6.5 from 12.0 on Jan 18, per PCP. Likely secondary warm AIHA, s/p 1u PRBC repeat CBC 7.7  Pt. labs with elevated total bilirubin  - f/u Iron studies, Retic count, LDH, uric acid, fibrinogen, haptoglobin(low), chelsey (+), B12, folate, TSH (WNL)  - Heme consult - Prednisone 90mg daily  - Transfuse goal >7, s/p 1u PRBC

## 2018-05-03 NOTE — PROGRESS NOTE ADULT - PROBLEM SELECTOR PLAN 6
DVT prophalaxis- will hold in setting of anemia, encourage ambulation DVT prophylaxis- will hold in setting of anemia, encourage ambulation

## 2018-05-03 NOTE — PROGRESS NOTE ADULT - PROBLEM SELECTOR PLAN 5
Pt. has full ROM of neck, no lymphadenopathy, focal pain at nape of neck  - likely MSK, pt HDS without any focal deficits   - if progresses with obtain imaging Pt. has full ROM of neck, no lymphadenopathy, focal pain at nape of neck  - likely MSK, pt HDS without any focal deficits   - if progresses will obtain imaging

## 2018-05-03 NOTE — PROGRESS NOTE ADULT - PROBLEM SELECTOR PLAN 2
Pt found to have leukocytosis, no s/s of infections, pt afebrile  - will trend cbc, continue to monitor  - if patient spikes fever will obtain blood cultures Pt found to have leukocytosis, no s/s of infections, pt afebrile  - will trend cbc, continue to monitor  - WBC elevated 2/2 to prednisone  - if patient spikes fever will obtain blood cultures

## 2018-05-03 NOTE — PROGRESS NOTE ADULT - PROBLEM SELECTOR PLAN 4
Pt currently HDS stable will hold BP meds in setting of low Hgb  - restart BP meds ( HCTZ, losartan, hydralizine, coreg) as tolerated Pt currently HDS stable will hold BP meds in setting of low Hgb  - restart BP meds ( HCTZ, losartan, hydralazine, coreg) as tolerated

## 2018-05-04 ENCOUNTER — TRANSCRIPTION ENCOUNTER (OUTPATIENT)
Age: 68
End: 2018-05-04

## 2018-05-04 DIAGNOSIS — D59.1 OTHER AUTOIMMUNE HEMOLYTIC ANEMIAS: ICD-10-CM

## 2018-05-04 LAB
BASOPHILS # BLD AUTO: 0.09 K/UL — SIGNIFICANT CHANGE UP (ref 0–0.2)
BASOPHILS NFR BLD AUTO: 0.3 % — SIGNIFICANT CHANGE UP (ref 0–2)
BUN SERPL-MCNC: 29 MG/DL — HIGH (ref 7–23)
CALCIUM SERPL-MCNC: 8.9 MG/DL — SIGNIFICANT CHANGE UP (ref 8.4–10.5)
CHLORIDE SERPL-SCNC: 101 MMOL/L — SIGNIFICANT CHANGE UP (ref 98–107)
CO2 SERPL-SCNC: 25 MMOL/L — SIGNIFICANT CHANGE UP (ref 22–31)
CREAT SERPL-MCNC: 0.97 MG/DL — SIGNIFICANT CHANGE UP (ref 0.5–1.3)
EOSINOPHIL # BLD AUTO: 0.04 K/UL — SIGNIFICANT CHANGE UP (ref 0–0.5)
EOSINOPHIL NFR BLD AUTO: 0.1 % — SIGNIFICANT CHANGE UP (ref 0–6)
GLUCOSE SERPL-MCNC: 135 MG/DL — HIGH (ref 70–99)
HCT VFR BLD CALC: 20.4 % — CRITICAL LOW (ref 34.5–45)
HGB BLD-MCNC: 7 G/DL — CRITICAL LOW (ref 11.5–15.5)
IMM GRANULOCYTES # BLD AUTO: 1.82 # — SIGNIFICANT CHANGE UP
IMM GRANULOCYTES NFR BLD AUTO: 5.5 % — HIGH (ref 0–1.5)
LYMPHOCYTES # BLD AUTO: 10.3 % — LOW (ref 13–44)
LYMPHOCYTES # BLD AUTO: 3.44 K/UL — HIGH (ref 1–3.3)
MAGNESIUM SERPL-MCNC: 2 MG/DL — SIGNIFICANT CHANGE UP (ref 1.6–2.6)
MCHC RBC-ENTMCNC: 31.7 PG — SIGNIFICANT CHANGE UP (ref 27–34)
MCHC RBC-ENTMCNC: 34.3 % — SIGNIFICANT CHANGE UP (ref 32–36)
MCV RBC AUTO: 92.3 FL — SIGNIFICANT CHANGE UP (ref 80–100)
MONOCYTES # BLD AUTO: 1.8 K/UL — HIGH (ref 0–0.9)
MONOCYTES NFR BLD AUTO: 5.4 % — SIGNIFICANT CHANGE UP (ref 2–14)
NEUTROPHILS # BLD AUTO: 26.08 K/UL — HIGH (ref 1.8–7.4)
NEUTROPHILS NFR BLD AUTO: 78.4 % — HIGH (ref 43–77)
NRBC # FLD: 2.48 — SIGNIFICANT CHANGE UP
NRBC FLD-RTO: 7.5 — SIGNIFICANT CHANGE UP
PHOSPHATE SERPL-MCNC: 3.2 MG/DL — SIGNIFICANT CHANGE UP (ref 2.5–4.5)
PLATELET # BLD AUTO: 376 K/UL — SIGNIFICANT CHANGE UP (ref 150–400)
PMV BLD: 9.5 FL — SIGNIFICANT CHANGE UP (ref 7–13)
POTASSIUM SERPL-MCNC: 3.6 MMOL/L — SIGNIFICANT CHANGE UP (ref 3.5–5.3)
POTASSIUM SERPL-SCNC: 3.6 MMOL/L — SIGNIFICANT CHANGE UP (ref 3.5–5.3)
RBC # BLD: 2.21 M/UL — LOW (ref 3.8–5.2)
RBC # FLD: 24.9 % — HIGH (ref 10.3–14.5)
SODIUM SERPL-SCNC: 139 MMOL/L — SIGNIFICANT CHANGE UP (ref 135–145)
TM INTERPRETATION: SIGNIFICANT CHANGE UP
WBC # BLD: 33.27 K/UL — HIGH (ref 3.8–10.5)
WBC # FLD AUTO: 33.27 K/UL — HIGH (ref 3.8–10.5)

## 2018-05-04 PROCEDURE — 74177 CT ABD & PELVIS W/CONTRAST: CPT | Mod: 26

## 2018-05-04 PROCEDURE — 99233 SBSQ HOSP IP/OBS HIGH 50: CPT | Mod: GC

## 2018-05-04 PROCEDURE — 99232 SBSQ HOSP IP/OBS MODERATE 35: CPT | Mod: GC

## 2018-05-04 PROCEDURE — 88189 FLOWCYTOMETRY/READ 16 & >: CPT

## 2018-05-04 PROCEDURE — 70491 CT SOFT TISSUE NECK W/DYE: CPT | Mod: 26

## 2018-05-04 PROCEDURE — 71260 CT THORAX DX C+: CPT | Mod: 26

## 2018-05-04 RX ORDER — LOSARTAN POTASSIUM 100 MG/1
100 TABLET, FILM COATED ORAL DAILY
Qty: 0 | Refills: 0 | Status: DISCONTINUED | OUTPATIENT
Start: 2018-05-05 | End: 2018-05-09

## 2018-05-04 RX ORDER — LOSARTAN POTASSIUM 100 MG/1
100 TABLET, FILM COATED ORAL DAILY
Qty: 0 | Refills: 0 | Status: DISCONTINUED | OUTPATIENT
Start: 2018-05-04 | End: 2018-05-04

## 2018-05-04 RX ORDER — SODIUM CHLORIDE 9 MG/ML
1000 INJECTION INTRAMUSCULAR; INTRAVENOUS; SUBCUTANEOUS
Qty: 0 | Refills: 0 | Status: DISCONTINUED | OUTPATIENT
Start: 2018-05-04 | End: 2018-05-09

## 2018-05-04 RX ORDER — LOSARTAN POTASSIUM 100 MG/1
50 TABLET, FILM COATED ORAL ONCE
Qty: 0 | Refills: 0 | Status: COMPLETED | OUTPATIENT
Start: 2018-05-04 | End: 2018-05-04

## 2018-05-04 RX ADMIN — PANTOPRAZOLE SODIUM 40 MILLIGRAM(S): 20 TABLET, DELAYED RELEASE ORAL at 06:02

## 2018-05-04 RX ADMIN — Medication 145 MILLIGRAM(S): at 11:32

## 2018-05-04 RX ADMIN — CARVEDILOL PHOSPHATE 6.25 MILLIGRAM(S): 80 CAPSULE, EXTENDED RELEASE ORAL at 06:02

## 2018-05-04 RX ADMIN — SODIUM CHLORIDE 50 MILLILITER(S): 9 INJECTION INTRAMUSCULAR; INTRAVENOUS; SUBCUTANEOUS at 10:50

## 2018-05-04 RX ADMIN — Medication 90 MILLIGRAM(S): at 06:02

## 2018-05-04 RX ADMIN — LOSARTAN POTASSIUM 50 MILLIGRAM(S): 100 TABLET, FILM COATED ORAL at 18:33

## 2018-05-04 RX ADMIN — LOSARTAN POTASSIUM 50 MILLIGRAM(S): 100 TABLET, FILM COATED ORAL at 06:02

## 2018-05-04 NOTE — PROGRESS NOTE ADULT - PROBLEM SELECTOR PLAN 1
Pt found to be anemic to 6.5 from 12.0 on Jan 18, per PCP. Likely secondary warm AIHA, s/p 1u PRBC repeat CBC 7.7  Pt. labs with elevated total bilirubin  - f/u Iron studies, Retic count, LDH, uric acid, fibrinogen, haptoglobin(low), chelsey (+), B12, folate, TSH (WNL)  - Heme consult - Prednisone 90mg daily  - Transfuse goal >7, s/p 1u PRBC

## 2018-05-04 NOTE — DISCHARGE NOTE ADULT - PLAN OF CARE
Resolution and Treatment You were found to have autoimmune hemolytic anemia. You were given multiple units of PRBCs and your blood work was monitored for hemolysis and you received steroids. Please follow up with hematology outpatient and continue with medication as prescribed. Control Please continue with your blood pressure medication as prescribed. Please stop hydrochlorothiazide and continue with amlodipine and losartan. Please follow up with PCP for routine care and monitoring.

## 2018-05-04 NOTE — CHART NOTE - NSCHARTNOTEFT_GEN_A_CORE
Discussed CT imaging with radiology.     Findings are nonspecific and unlikely to represent an underlying lymphoma/leukemia. There is no mass to biopsy or further imaging recommended unless symptoms warrant. Stranding findings are most consistent with a pyelonephritis, however patient does not have signs/symptoms of this at this time. Flow cytometry was sent on blood work, but peripheral blood reviewed and there is no finding of lymphoma or leukemic cells. No hematology objection to discharge over the weekend for outpatient follow up if Hgb is improving and patient remains clinically stable.     Dago Abreu  PGY-5, Hematology-Oncology Fellow  [phone] ([place]) [phone] ([facility])

## 2018-05-04 NOTE — PROGRESS NOTE ADULT - PROBLEM SELECTOR PLAN 4
Pt currently HDS stable will hold BP meds in setting of low Hgb  - restart BP meds ( HCTZ, losartan, hydralazine, coreg) as tolerated

## 2018-05-04 NOTE — PROGRESS NOTE ADULT - PROBLEM SELECTOR PLAN 2
Pt found to have leukocytosis, no s/s of infections, pt afebrile  - will trend cbc, continue to monitor  - WBC elevated 2/2 to prednisone  - if patient spikes fever will obtain blood cultures

## 2018-05-04 NOTE — PROGRESS NOTE ADULT - SUBJECTIVE AND OBJECTIVE BOX
CONTACT INFO:  Dale Resendiz MD  Internal Medicine PGY1  Pager:  [phone]/ [phone]    M-F 7am-7pm:  pager covered by day team     *Academic conferences M-F 8am-9am & 12pm-1pm - page ONLY if urgent or if consultant  M-F & Sa-Sun 7pm-7am:  NS  page 1443 for Teams 1-3, 1446 for Team 4 & Care Model/ [facility] Page [id number] for T 1-3 and 16916 for T4 &CM  Sa-Sun 7am-12pm:  see patient chart, primary physician assigned available 7am-12pm  Sa-Sun 12pm-7pm:  NS  page 1443 for Teams 1-4, [facility]  page Covering Resident    AUSTIN HAYNES  67y  Female    Patient is a 67y old  Female who presents with a chief complaint of Dizziness (02 May 2018 14:36)      INTERVAL HPI / OVERNIGHT EVENTS:      OBJECTIVE:  Telemetry (24 Hrs):     Vitals Signs (24 Hrs):  T(C): 36.3 (18 @ 05:57), Max: 36.7 (18 @ 14:11)  HR: 77 (18 @ 05:57) (70 - 79)  BP: 123/55 (18 @ 05:57) (123/55 - 158/57)  RR: 17 (18 @ 05:57) (17 - 18)  SpO2: 96% (18 @ 05:57) (95% - 97%)    PHYSICAL EXAM:  General: Comfortable, no apparent distress  HEENT: Atraumatic; EOMI, PERRLA, conjunctiva and sclera clear; no tonsillar erythema/exudates/enlargement  Neck: Supple; no JVD; thyroid normal without masses or enlargement  Chest/Lungs: Clear to auscultation B/L; no rales, rhonchi or wheezing  Heart: Regular rate and rhythm; normal S1/S2; no murmurs, rubs, or gallops  Abdomen: Soft, nontender, nondistended; bowel sounds present  Extremities: PT/DP pulses 2+ B/L; no edema  Skin: No rashes or lesions  Neurological: Alert and oriented to person/place/time    LABS:                        7.7    19.18 )-----------( 390      ( 03 May 2018 05:50 )             22.3     05-03    140  |  101  |  18  ----------------------------<  158<H>  3.6   |  25  |  0.80    Ca    9.1      03 May 2018 05:50  Phos  2.7     05-03  Mg     1.9     05-03    TPro  7.0  /  Alb  3.9  /  TBili  2.9<H>  /  DBili  0.3<H>  /  AST  32  /  ALT  21  /  AlkPhos  75  05-02      Urinalysis Basic - ( 02 May 2018 10:50 )    Color: PLYEL / Appearance: CLEAR / S.007 / pH: 7.5  Gluc: NEGATIVE / Ketone: NEGATIVE  / Bili: NEGATIVE / Urobili: NORMAL mg/dL   Blood: NEGATIVE / Protein: NEGATIVE mg/dL / Nitrite: NEGATIVE   Leuk Esterase: NEGATIVE / RBC: x / WBC x   Sq Epi: OCC / Non Sq Epi: x / Bacteria: x      CAPILLARY BLOOD GLUCOSE              RADIOLOGY & ADDITIONAL TESTS:      MEDICATIONS:  carvedilol 6.25 milliGRAM(s) Oral every 12 hours  fenofibrate Tablet 145 milliGRAM(s) Oral daily  losartan 50 milliGRAM(s) Oral daily  pantoprazole    Tablet 40 milliGRAM(s) Oral before breakfast  predniSONE   Tablet 90 milliGRAM(s) Oral daily      ALLERGIES:  No Known Allergies CONTACT INFO:  Dale Resendiz MD  Internal Medicine PGY1  Pager:  [phone]/ [phone]    M-F 7am-7pm:  pager covered by day team     *Academic conferences M-F 8am-9am & 12pm-1pm - page ONLY if urgent or if consultant  M-F & Sa-Sun 7pm-7am:  NS  page 1443 for Teams 1-3, 1446 for Team 4 & Care Model/ [facility] Page [id number] for T 1-3 and 72141 for T4 &CM  Sa-Sun 7am-12pm:  see patient chart, primary physician assigned available 7am-12pm  Sa-Sun 12pm-7pm:  NS  page 1443 for Teams 1-4, [facility]  page Covering Resident    AUSTIN HAYNES  67y  Female    Patient is a 67y old  Female who presents with a chief complaint of Dizziness (02 May 2018 14:36)      INTERVAL HPI / OVERNIGHT EVENTS: NAEON, pt afebrile VSS, pt denies cp, sob, f/c, n/v.      OBJECTIVE:  Telemetry (24 Hrs):     Vitals Signs (24 Hrs):  T(C): 36.3 (18 @ 05:57), Max: 36.7 (18 @ 14:11)  HR: 77 (18 @ 05:57) (70 - 79)  BP: 123/55 (18 @ 05:57) (123/55 - 158/57)  RR: 17 (18 @ 05:57) (17 - 18)  SpO2: 96% (18 @ 05:57) (95% - 97%)    PHYSICAL EXAM:  GENERAL: NAD, well-developed, amublating well  HEAD:  Atraumatic, Normocephalic  EYES: EOMI, PERRLA, conjunctiva and minimal sclera icterus +  NECK: Supple, No JVD  CHEST/LUNG: Clear to auscultation bilaterally; No wheeze  HEART: Regular rate and rhythm; No murmurs, rubs, or gallops  ABDOMEN: Soft, Nontender, Nondistended; Bowel sounds present  EXTREMITIES:  2+ Peripheral Pulses, No clubbing, cyanosis, or edema  PSYCH: AAOx3  NEUROLOGY: non-focal, focal neck tenderness AOx3  SKIN: No rashes or lesions    LABS:                        7.7    19.18 )-----------( 390      ( 03 May 2018 05:50 )             22.3     05-03    140  |  101  |  18  ----------------------------<  158<H>  3.6   |  25  |  0.80    Ca    9.1      03 May 2018 05:50  Phos  2.7     05-03  Mg     1.9     05-03    TPro  7.0  /  Alb  3.9  /  TBili  2.9<H>  /  DBili  0.3<H>  /  AST  32  /  ALT  21  /  AlkPhos  75  05-02      Urinalysis Basic - ( 02 May 2018 10:50 )    Color: PLYEL / Appearance: CLEAR / S.007 / pH: 7.5  Gluc: NEGATIVE / Ketone: NEGATIVE  / Bili: NEGATIVE / Urobili: NORMAL mg/dL   Blood: NEGATIVE / Protein: NEGATIVE mg/dL / Nitrite: NEGATIVE   Leuk Esterase: NEGATIVE / RBC: x / WBC x   Sq Epi: OCC / Non Sq Epi: x / Bacteria: x      CAPILLARY BLOOD GLUCOSE              RADIOLOGY & ADDITIONAL TESTS:      MEDICATIONS:  carvedilol 6.25 milliGRAM(s) Oral every 12 hours  fenofibrate Tablet 145 milliGRAM(s) Oral daily  losartan 50 milliGRAM(s) Oral daily  pantoprazole    Tablet 40 milliGRAM(s) Oral before breakfast  predniSONE   Tablet 90 milliGRAM(s) Oral daily      ALLERGIES:  No Known Allergies

## 2018-05-04 NOTE — DISCHARGE NOTE ADULT - PATIENT PORTAL LINK FT
You can access the [employer] [facility] Patient Portal, offered by [facility], by registering with the following website: [email or web address]

## 2018-05-04 NOTE — DISCHARGE NOTE ADULT - ADDITIONAL INSTRUCTIONS
Please follow up with Dr. Quan at [facility], Entrance B [address] [facility] [phone] (New patient unit), if patient is not called by Friday please ask patient to call above number. Please follow up with your PCP within 1 week of discharge.

## 2018-05-04 NOTE — DISCHARGE NOTE ADULT - HOSPITAL COURSE
67 year old female with PMHx of HTN, CAD, GERD presented to the ED with 2 days of subjective fever and 3 months of left sided ear fullness and tinnitus. Blood work consistent for hemolytic anemia, and patient was started on 90 mg prednisone. She received 1 unit of PRBCs. Patient was discharged to home with prescription for prednisone and to follow up with outpatient hematology. 67 year old female with PMHx of HTN, CAD, GERD presented to the ED with 2 days of subjective fever and 3 months of left sided ear fullness and tinnitus. Blood work consistent for hemolytic anemia, and patient was started on 90 mg prednisone. She received multiple unit of PRBCs and blood work was monitored until hemolysis labs were down trending. Patient was discharged to home with prescription for prednisone and to follow up with outpatient hematology. 67 year old female with PMHx of HTN, CAD, GERD presented to the ED with 2 days of subjective fever and 3 months of left sided ear fullness and tinnitus. Blood work consistent for hemolytic anemia, and patient was started on 90 mg prednisone. She received multiple unit of PRBCs and blood work was monitored until hemolysis labs were down trending. Patient was discharged to home with prescription for prednisone and to follow up with outpatient hematology.   Patient's hydrochlorothiazide was stopped on admission and amlodipine was started to control her hypertension.   Patient will follow up with hematology and her PCP after discharge.

## 2018-05-04 NOTE — PROGRESS NOTE ADULT - PROBLEM SELECTOR PLAN 5
Pt. has full ROM of neck, no lymphadenopathy, focal pain at nape of neck  - likely MSK, pt HDS without any focal deficits   - if progresses will obtain imaging Pt. has full ROM of neck, no lymphadenopathy, focal pain at nape of neck improved  - likely MSK, pt HDS without any focal deficits   - if progresses will obtain imaging

## 2018-05-04 NOTE — DISCHARGE NOTE ADULT - CARE PROVIDER_API CALL
Katharina Quan), HematologyOncology; Internal Medicine  [address] [place] [address]  Phone: [phone]  Fax: [phone]    Giles Bates  [address] [place] [address]  Phone: [phone]  Fax: (       -

## 2018-05-04 NOTE — DISCHARGE NOTE ADULT - PROVIDER TOKENS
TOKEN:'[id number]',FREE:[LAST:[Jana],FIRST:[Giles],PHONE:[phone]],FAX:[(   )    -],ADDRESS:[[address] [place] [address]]]

## 2018-05-04 NOTE — DISCHARGE NOTE ADULT - CARE PLAN
Principal Discharge DX:	Autoimmune hemolytic anemia  Goal:	Resolution and Treatment  Assessment and plan of treatment:	You were found to have autoimmune hemolytic anemia. You were given multiple units of PRBCs and your blood work was monitored for hemolysis and you received steroids. Please follow up with hematology outpatient and continue with medication as prescribed.  Secondary Diagnosis:	HTN (Hypertension)  Goal:	Control  Assessment and plan of treatment:	Please continue with your blood pressure medication as prescribed. Please stop hydrochlorothiazide and continue with amlodipine and losartan. Please follow up with PCP for routine care and monitoring.

## 2018-05-04 NOTE — PROGRESS NOTE ADULT - PROBLEM SELECTOR PLAN 1
patient with warm AIHA  -agree with prednisone 90mg, can likely taper by 10 mg every 4 days after the hemoglobin stabilizes, would not start taper until hemoglobin shown to be stable  -while inpatient please check CBC, LDH, reticulocyte count daily  -follow up flow cytometery and CT scans to rule out AIHA secondary to malignant proces  -making referral to [facility], Entrance B [address] [facility] [phone] (New patient unit), if patient is not called by Tuesday please ask patient to make referral     Hematology fellow will follow counts over weekend, if any questions or concerns please page fellow on call    Plan discussed with primary team attending Dr. Sesar Abreu  PGY-5, Hematology-Oncology Fellow  [phone] ([place]) [phone] ([facility])

## 2018-05-04 NOTE — DISCHARGE NOTE ADULT - CARE PROVIDERS DIRECT ADDRESSES
[email or web address]

## 2018-05-04 NOTE — PROGRESS NOTE ADULT - SUBJECTIVE AND OBJECTIVE BOX
INTERVAL HPI/OVERNIGHT EVENTS:  Patient S&E at bedside. No overnight events, patient feels much improved from admission but reports continued dizziness when standing quickly as well as a 'swishing' sensation in her bilateral ears.    VITAL SIGNS:  T(F): 97.4 (05-04-18 @ 05:57)  HR: 77 (05-04-18 @ 05:57)  BP: 123/55 (05-04-18 @ 05:57)  RR: 17 (05-04-18 @ 05:57)  SpO2: 96% (05-04-18 @ 05:57)  Wt(kg): --    PHYSICAL EXAM:    Constitutional: NAD  Eyes: EOMI, sclera non-icteric  Neck: supple, no masses, no JVD  Respiratory: CTA b/l, good air entry b/l  Cardiovascular: RRR, no M/R/G  Gastrointestinal: soft, NTND, no masses palpable, + BS, no hepatosplenomegaly  Extremities: no c/c/e  Neurological: AAOx3      MEDICATIONS  (STANDING):  carvedilol 6.25 milliGRAM(s) Oral every 12 hours  fenofibrate Tablet 145 milliGRAM(s) Oral daily  losartan 50 milliGRAM(s) Oral daily  pantoprazole    Tablet 40 milliGRAM(s) Oral before breakfast  predniSONE   Tablet 90 milliGRAM(s) Oral daily  sodium chloride 0.9%. 1000 milliLiter(s) (50 mL/Hr) IV Continuous <Continuous>    Allergies  No Known Allergies    LABS:                        7.0    33.27 )-----------( 376      ( 04 May 2018 06:50 )             20.4     05-04    139  |  101  |  29<H>  ----------------------------<  135<H>  3.6   |  25  |  0.97    Ca    8.9      04 May 2018 06:50  Phos  3.2     05-04  Mg     2.0     05-04            RADIOLOGY & ADDITIONAL TESTS:  Studies reviewed.    ASSESSMENT & PLAN: [employer] through [employer]  ID# [id number]    INTERVAL HPI/OVERNIGHT EVENTS:  Patient S&E at bedside. No overnight events, patient feels much improved from admission but reports continued dizziness when standing quickly as well as a 'swishing' sensation in her bilateral ears.    VITAL SIGNS:  T(F): 97.4 (05-04-18 @ 05:57)  HR: 77 (05-04-18 @ 05:57)  BP: 123/55 (05-04-18 @ 05:57)  RR: 17 (05-04-18 @ 05:57)  SpO2: 96% (05-04-18 @ 05:57)  Wt(kg): --    PHYSICAL EXAM:  Constitutional: NAD  Eyes: EOMI, sclera non-icteric  Neck: supple, no JVD  Respiratory: CTA b/l, good air entry b/l without R/W/R  Cardiovascular: +S1S2 RRR, no M/R/G  Gastrointestinal: soft, NTND, + BS  Extremities: no c/c/e  Neurological: AAOx3    MEDICATIONS  (STANDING):  carvedilol 6.25 milliGRAM(s) Oral every 12 hours  fenofibrate Tablet 145 milliGRAM(s) Oral daily  losartan 50 milliGRAM(s) Oral daily  pantoprazole    Tablet 40 milliGRAM(s) Oral before breakfast  predniSONE   Tablet 90 milliGRAM(s) Oral daily  sodium chloride 0.9%. 1000 milliLiter(s) (50 mL/Hr) IV Continuous <Continuous>    Allergies  No Known Allergies    LABS:                        7.0    33.27 )-----------( 376      ( 04 May 2018 06:50 )             20.4     05-04    139  |  101  |  29<H>  ----------------------------<  135<H>  3.6   |  25  |  0.97    Ca    8.9      04 May 2018 06:50  Phos  3.2     05-04  Mg     2.0     05-04    RADIOLOGY & ADDITIONAL TESTS:  Studies reviewed.    ASSESSMENT & PLAN:

## 2018-05-04 NOTE — DISCHARGE NOTE ADULT - MEDICATION SUMMARY - MEDICATIONS TO TAKE
I will START or STAY ON the medications listed below when I get home from the hospital:    predniSONE 10 mg oral tablet  -- 9 tab(s) by mouth once a day  -- Indication: For Autoimmune hemolytic anemia    aspirin 81 mg oral tablet, chewable  -- 1 tab(s) by mouth once a day  -- Indication: For CAD (coronary artery disease)    losartan 100 mg oral tablet  -- 1 tab(s) by mouth once a day  -- Indication: For HTN (Hypertension)    fenofibric acid 135 mg oral delayed release capsule  -- 1 cap(s) by mouth once a day  -- Indication: For Mixed hyperlipidemia    Coreg 6.25 mg oral tablet  -- 1 tab(s) by mouth 2 times a day  -- Indication: For HTN (Hypertension)    amLODIPine 5 mg oral tablet  -- 1 tab(s) by mouth once a day  -- Indication: For HTN (Hypertension)    polyethylene glycol 3350 oral powder for reconstitution  -- 17 gram(s) by mouth once a day  -- Indication: For Constipation    senna oral tablet  -- 2 tab(s) by mouth once a day (at bedtime)  -- Indication: For Constipation    omeprazole 20 mg oral delayed release capsule  -- 1 cap(s) by mouth once a day  -- Indication: For GERD    folic acid 1 mg oral tablet  -- 1 tab(s) by mouth once a day  -- Indication: For Anemia

## 2018-05-05 DIAGNOSIS — E04.1 NONTOXIC SINGLE THYROID NODULE: ICD-10-CM

## 2018-05-05 DIAGNOSIS — K59.00 CONSTIPATION, UNSPECIFIED: ICD-10-CM

## 2018-05-05 LAB
BASOPHILS # BLD AUTO: 0.07 K/UL — SIGNIFICANT CHANGE UP (ref 0–0.2)
BASOPHILS NFR BLD AUTO: 0.2 % — SIGNIFICANT CHANGE UP (ref 0–2)
BLD GP AB SCN SERPL QL: NEGATIVE — SIGNIFICANT CHANGE UP
EOSINOPHIL # BLD AUTO: 0.13 K/UL — SIGNIFICANT CHANGE UP (ref 0–0.5)
EOSINOPHIL NFR BLD AUTO: 0.4 % — SIGNIFICANT CHANGE UP (ref 0–6)
HCT VFR BLD CALC: 20 % — CRITICAL LOW (ref 34.5–45)
HGB BLD-MCNC: 6.8 G/DL — CRITICAL LOW (ref 11.5–15.5)
IMM GRANULOCYTES # BLD AUTO: 1.92 # — SIGNIFICANT CHANGE UP
IMM GRANULOCYTES NFR BLD AUTO: 6.5 % — HIGH (ref 0–1.5)
LYMPHOCYTES # BLD AUTO: 18.3 % — SIGNIFICANT CHANGE UP (ref 13–44)
LYMPHOCYTES # BLD AUTO: 5.42 K/UL — HIGH (ref 1–3.3)
MANUAL SMEAR VERIFICATION: SIGNIFICANT CHANGE UP
MCHC RBC-ENTMCNC: 33 PG — SIGNIFICANT CHANGE UP (ref 27–34)
MCHC RBC-ENTMCNC: 34 % — SIGNIFICANT CHANGE UP (ref 32–36)
MCV RBC AUTO: 97.1 FL — SIGNIFICANT CHANGE UP (ref 80–100)
MONOCYTES # BLD AUTO: 1.33 K/UL — HIGH (ref 0–0.9)
MONOCYTES NFR BLD AUTO: 4.5 % — SIGNIFICANT CHANGE UP (ref 2–14)
NEUTROPHILS # BLD AUTO: 20.78 K/UL — HIGH (ref 1.8–7.4)
NEUTROPHILS NFR BLD AUTO: 70.1 % — SIGNIFICANT CHANGE UP (ref 43–77)
NRBC # FLD: 5.4 — SIGNIFICANT CHANGE UP
NRBC FLD-RTO: 18.2 — SIGNIFICANT CHANGE UP
PLATELET # BLD AUTO: 323 K/UL — SIGNIFICANT CHANGE UP (ref 150–400)
PMV BLD: 9.6 FL — SIGNIFICANT CHANGE UP (ref 7–13)
RBC # BLD: 2.06 M/UL — LOW (ref 3.8–5.2)
RBC # FLD: 27.9 % — HIGH (ref 10.3–14.5)
RH IG SCN BLD-IMP: POSITIVE — SIGNIFICANT CHANGE UP
WBC # BLD: 29.65 K/UL — HIGH (ref 3.8–10.5)
WBC # FLD AUTO: 29.65 K/UL — HIGH (ref 3.8–10.5)

## 2018-05-05 PROCEDURE — 99233 SBSQ HOSP IP/OBS HIGH 50: CPT | Mod: GC

## 2018-05-05 RX ORDER — SENNA PLUS 8.6 MG/1
2 TABLET ORAL AT BEDTIME
Qty: 0 | Refills: 0 | Status: DISCONTINUED | OUTPATIENT
Start: 2018-05-05 | End: 2018-05-09

## 2018-05-05 RX ORDER — POLYETHYLENE GLYCOL 3350 17 G/17G
17 POWDER, FOR SOLUTION ORAL DAILY
Qty: 0 | Refills: 0 | Status: DISCONTINUED | OUTPATIENT
Start: 2018-05-05 | End: 2018-05-09

## 2018-05-05 RX ORDER — DOCUSATE SODIUM 100 MG
100 CAPSULE ORAL
Qty: 0 | Refills: 0 | Status: DISCONTINUED | OUTPATIENT
Start: 2018-05-05 | End: 2018-05-09

## 2018-05-05 RX ADMIN — CARVEDILOL PHOSPHATE 6.25 MILLIGRAM(S): 80 CAPSULE, EXTENDED RELEASE ORAL at 19:42

## 2018-05-05 RX ADMIN — Medication 90 MILLIGRAM(S): at 06:38

## 2018-05-05 RX ADMIN — PANTOPRAZOLE SODIUM 40 MILLIGRAM(S): 20 TABLET, DELAYED RELEASE ORAL at 06:38

## 2018-05-05 RX ADMIN — SENNA PLUS 2 TABLET(S): 8.6 TABLET ORAL at 23:06

## 2018-05-05 RX ADMIN — Medication 100 MILLIGRAM(S): at 19:41

## 2018-05-05 RX ADMIN — POLYETHYLENE GLYCOL 3350 17 GRAM(S): 17 POWDER, FOR SOLUTION ORAL at 11:04

## 2018-05-05 RX ADMIN — Medication 145 MILLIGRAM(S): at 11:04

## 2018-05-05 RX ADMIN — LOSARTAN POTASSIUM 100 MILLIGRAM(S): 100 TABLET, FILM COATED ORAL at 08:30

## 2018-05-05 NOTE — PROGRESS NOTE ADULT - PROBLEM SELECTOR PLAN 6
DVT prophylaxis- will hold in setting of anemia, encourage ambulation - Patient endorses constipation this AM (last BM ~ 3-4 days ago). Will start Senna/Colace/Miralax  - Will continue to monitor

## 2018-05-05 NOTE — PROGRESS NOTE ADULT - PROBLEM SELECTOR PLAN 2
Pt found to have leukocytosis, no s/s of infections, pt afebrile  - will trend cbc, continue to monitor  - WBC elevated 2/2 to prednisone  - if patient spikes fever will obtain blood cultures - Patient noted to have a leukocytosis on blood work. She has otherwise been afebrile and without signs or symptoms of infection. Her increased WBC may also be falsely elevated 2/2 being on high dose steroids.  - Will continue to monitor off antibiotics. If patient were to spike a fever, will check blood cultures.  - Monitor CBC and fever curve

## 2018-05-05 NOTE — PROGRESS NOTE ADULT - PROBLEM SELECTOR PLAN 4
Pt currently HDS stable will hold BP meds in setting of low Hgb  - restart BP meds ( HCTZ, losartan, hydralazine, coreg) as tolerated - C/w with Coreg, Fenofibrate, and ASA. Patient without complaints of chest pain or shortness of breath at this time.

## 2018-05-05 NOTE — PROGRESS NOTE ADULT - PROBLEM SELECTOR PLAN 5
Pt. has full ROM of neck, no lymphadenopathy, focal pain at nape of neck improved  - likely MSK, pt HDS without any focal deficits   - if progresses will obtain imaging - Patient borderline hypertensive over the past 24 hours. Will c/w home Losartan and Coreg and hold home HCTZ and Hydralazine for now.  - Will monitor BP and resume home BP meds as appropriate

## 2018-05-05 NOTE — PROGRESS NOTE ADULT - PROBLEM SELECTOR PLAN 1
Pt found to be anemic to 6.5 from 12.0 on Jan 18, per PCP. Likely secondary warm AIHA, s/p 1u PRBC repeat CBC 7.7  Pt. labs with elevated total bilirubin  - f/u Iron studies, Retic count, LDH, uric acid, fibrinogen, haptoglobin(low), chelsey (+), B12, folate, TSH (WNL)  - Heme consult - Prednisone 90mg daily  - Transfuse goal >7, s/p 1u PRBC - Patient was found to have a Hgb of 6.3 on presentation (Hgb had been 12.0 on 1/18 per PMD). She was found to be Paolo positive a/w elevated T. 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   - Hematology following, appreciate recs. Patient underwent a CT C/A/P yesterday and the report mentioned the possibility of lymphoma or leukemia. However, the imaging was reviewed by Hematology who suspect this is unlikely. Flow cytometry of peripheral blood also returned grossly WNL.  Patient was given 1U of pRBCs on 5/2 with improvement  - Heme consult - Prednisone 90mg daily  - Transfuse goal >7, s/p 1u PRBC - Patient was found to have a Hgb of 6.3 on presentation (Hgb had been 12.0 on 1/18 per PMD). She was found to be Paolo positive a/w elevated T. 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   - Hematology following, appreciate recs. Patient underwent a CT C/A/P yesterday and the report mentioned the possibility of lymphoma or leukemia. However, the imaging was reviewed by Hematology who suspect this is unlikely. Flow cytometry of peripheral blood also returned grossly WNL.  - C/w Prednisone 90mg QD  - Patient was given 1U of pRBCs on 5/2 with improvement; however, her Hgb has since downtrended. Since her Hgb is 6.8 this AM, will transfuse 1U of pRBCs today.  - Monitor CBC. Keep active T+S

## 2018-05-05 NOTE — PROGRESS NOTE ADULT - PROBLEM SELECTOR PLAN 3
c/w with fenofibrate and ASA - Patient noted to have a left thyroid nodule on CT scan yesterday. Since she will likely be in the hospital for the next few days, will check a Thyroid U/S  - Will continue to monitor

## 2018-05-05 NOTE — PROGRESS NOTE ADULT - SUBJECTIVE AND OBJECTIVE BOX
Patient is a 67y old  Female who presents with a chief complaint of Dizziness (04 May 2018 15:50)      SUBJECTIVE / OVERNIGHT EVENTS:    MEDICATIONS  (STANDING):  carvedilol 6.25 milliGRAM(s) Oral every 12 hours  fenofibrate Tablet 145 milliGRAM(s) Oral daily  losartan 100 milliGRAM(s) Oral daily  pantoprazole    Tablet 40 milliGRAM(s) Oral before breakfast  predniSONE   Tablet 90 milliGRAM(s) Oral daily  sodium chloride 0.9%. 1000 milliLiter(s) (50 mL/Hr) IV Continuous <Continuous>    MEDICATIONS  (PRN):        CAPILLARY BLOOD GLUCOSE        I&O's Summary    Vital Signs Last 24 Hrs  T(C): 36.5 (05 May 2018 05:10), Max: 36.9 (04 May 2018 21:27)  T(F): 97.7 (05 May 2018 05:10), Max: 98.5 (04 May 2018 21:27)  HR: 52 (05 May 2018 06:33) (50 - 73)  BP: 162/59 (05 May 2018 06:33) (115/44 - 178/70)  BP(mean): --  RR: 18 (05 May 2018 05:10) (18 - 18)  SpO2: 94% (05 May 2018 05:10) (94% - 100%)    PHYSICAL EXAM:  GENERAL: NAD, well-developed  HEAD:  Atraumatic, Normocephalic  EYES: EOMI, PERRLA, conjunctiva and sclera clear  NECK: Supple, No JVD  CHEST/LUNG: Clear to auscultation bilaterally; No wheeze  HEART: Regular rate and rhythm; No murmurs, rubs, or gallops  ABDOMEN: Soft, Nontender, Nondistended; Bowel sounds present  EXTREMITIES:  2+ Peripheral Pulses, No clubbing, cyanosis, or edema  PSYCH: AAOx3  NEUROLOGY: non-focal  SKIN: No rashes or lesions    LABS:                        7.0    33.27 )-----------( 376      ( 04 May 2018 06:50 )             20.4     05-04    139  |  101  |  29<H>  ----------------------------<  135<H>  3.6   |  25  |  0.97    Ca    8.9      04 May 2018 06:50  Phos  3.2     05-04  Mg     2.0     05-04                RADIOLOGY & ADDITIONAL TESTS:    Imaging Personally Reviewed:    Consultant(s) Notes Reviewed:    Hematology    Care Discussed with Consultants/Other Providers: Patient is a 67y old  Female who presents with a chief complaint of Dizziness (04 May 2018 15:50)    SUBJECTIVE / OVERNIGHT EVENTS:  No acute events overnight. Patient is primarily Welsh-speaking and so history was obtained with the help of a  (#[id number]). Patient reports that her left ear pain is improving, but she continues to hear a "noise" in her left ear. No complaints regarding her R ear. On ROS, patient endorses constipation (last BM ~ 3-4 days ago). Otherwise, she denies any chest pain, shortness of breath, nausea, vomiting, or abdominal pain.     MEDICATIONS  (STANDING):  carvedilol 6.25 milliGRAM(s) Oral every 12 hours  fenofibrate Tablet 145 milliGRAM(s) Oral daily  losartan 100 milliGRAM(s) Oral daily  pantoprazole    Tablet 40 milliGRAM(s) Oral before breakfast  predniSONE   Tablet 90 milliGRAM(s) Oral daily  sodium chloride 0.9%. 1000 milliLiter(s) (50 mL/Hr) IV Continuous <Continuous>    MEDICATIONS  (PRN):        CAPILLARY BLOOD GLUCOSE        I&O's Summary    Vital Signs Last 24 Hrs  T(C): 36.5 (05 May 2018 05:10), Max: 36.9 (04 May 2018 21:27)  T(F): 97.7 (05 May 2018 05:10), Max: 98.5 (04 May 2018 21:27)  HR: 52 (05 May 2018 06:33) (50 - 73)  BP: 162/59 (05 May 2018 06:33) (115/44 - 178/70)  BP(mean): --  RR: 18 (05 May 2018 05:10) (18 - 18)  SpO2: 94% (05 May 2018 05:10) (94% - 100%)    PHYSICAL EXAM:  GENERAL: NAD  HEAD:  NC/AT  ENMT: MMM  NECK: Supple  CHEST/LUNG: CTAB; No wheeze or rhonchi appreciated, Respirations nonlabored  HEART: RRR; +S1/S2  ABDOMEN: +BS, Soft, NT, No rigidity  GENITOURINARY: No Dorman, No suprapubic TTP  EXTREMITIES: No peripheral edema  NEUROLOGY: A+Ox3, Answers questions and follows commands appropriately  SKIN: Warm and dry  PSYCH: Normal mood and affect    LABS:                        7.0    33.27 )-----------( 376      ( 04 May 2018 06:50 )             20.4     05-04    139  |  101  |  29<H>  ----------------------------<  135<H>  3.6   |  25  |  0.97    Ca    8.9      04 May 2018 06:50  Phos  3.2     05-04  Mg     2.0     05-04                RADIOLOGY & ADDITIONAL TESTS:    Imaging Personally Reviewed:    Consultant(s) Notes Reviewed:    Hematology    Care Discussed with Consultants/Other Providers: Patient is a 67y old  Female who presents with a chief complaint of Dizziness (04 May 2018 15:50)    SUBJECTIVE / OVERNIGHT EVENTS:  No acute events overnight. Patient is primarily Malay-speaking and so history was obtained with the help of a  (#[id number]). Patient reports that her left ear pain is improving, but she continues to hear a "noise" in her left ear. No complaints regarding her R ear. On ROS, patient endorses constipation (last BM ~ 3-4 days ago). Otherwise, she denies any chest pain, shortness of breath, nausea, vomiting, or abdominal pain.     MEDICATIONS  (STANDING):  carvedilol 6.25 milliGRAM(s) Oral every 12 hours  fenofibrate Tablet 145 milliGRAM(s) Oral daily  losartan 100 milliGRAM(s) Oral daily  pantoprazole    Tablet 40 milliGRAM(s) Oral before breakfast  predniSONE   Tablet 90 milliGRAM(s) Oral daily  sodium chloride 0.9%. 1000 milliLiter(s) (50 mL/Hr) IV Continuous <Continuous>    MEDICATIONS  (PRN):        CAPILLARY BLOOD GLUCOSE        I&O's Summary    Vital Signs Last 24 Hrs  T(C): 36.5 (05 May 2018 05:10), Max: 36.9 (04 May 2018 21:27)  T(F): 97.7 (05 May 2018 05:10), Max: 98.5 (04 May 2018 21:27)  HR: 52 (05 May 2018 06:33) (50 - 73)  BP: 162/59 (05 May 2018 06:33) (115/44 - 178/70)  BP(mean): --  RR: 18 (05 May 2018 05:10) (18 - 18)  SpO2: 94% (05 May 2018 05:10) (94% - 100%)    PHYSICAL EXAM:  GENERAL: NAD  HEAD:  NC/AT  ENMT: MMM  NECK: Supple  CHEST/LUNG: CTAB; No wheeze or rhonchi appreciated, Respirations nonlabored  HEART: RRR; +S1/S2  ABDOMEN: +BS, Soft, NT, No rigidity  GENITOURINARY: No Dorman, No suprapubic TTP  EXTREMITIES: No peripheral edema  NEUROLOGY: A+Ox3, Answers questions and follows commands appropriately  SKIN: Warm and dry  PSYCH: Normal mood and affect    LABS:                        7.0    33.27 )-----------( 376      ( 04 May 2018 06:50 )             20.4     05-04    139  |  101  |  29<H>  ----------------------------<  135<H>  3.6   |  25  |  0.97    Ca    8.9      04 May 2018 06:50  Phos  3.2     05-04  Mg     2.0     05-04                RADIOLOGY & ADDITIONAL TESTS:    Imaging Personally Reviewed:  CT C/A/P (5/4): Small indeterminate peripheral opacities in the left upper lobe and right middle lobe. Heterogeneous enhancement of the kidneys bilaterally with striated nephrograms. Differential diagnosis includes infection, ischemia, and neoplastic etiologies such as lymphoma and leukemia. Indeterminate hypodense thyroid lesions which are further evaluated with ultrasound.    CT Neck Soft Tissue (5/4): Left thyroid nodule for which thyroid sonography is advised for further evaluation as clinically warranted. Otherwise unremarkable exam without other evidence of cervical mass or adenopathy.    Consultant(s) Notes Reviewed:    Hematology    Care Discussed with Consultants/Other Providers:

## 2018-05-06 LAB
ALBUMIN SERPL ELPH-MCNC: 3.4 G/DL — SIGNIFICANT CHANGE UP (ref 3.3–5)
ALP SERPL-CCNC: 67 U/L — SIGNIFICANT CHANGE UP (ref 40–120)
ALT FLD-CCNC: 20 U/L — SIGNIFICANT CHANGE UP (ref 4–33)
ANISOCYTOSIS BLD QL: SIGNIFICANT CHANGE UP
AST SERPL-CCNC: 36 U/L — HIGH (ref 4–32)
BASOPHILS # BLD AUTO: 0.07 K/UL — SIGNIFICANT CHANGE UP (ref 0–0.2)
BASOPHILS NFR BLD AUTO: 0.3 % — SIGNIFICANT CHANGE UP (ref 0–2)
BASOPHILS NFR SPEC: 0 % — SIGNIFICANT CHANGE UP (ref 0–2)
BILIRUB SERPL-MCNC: 3.9 MG/DL — HIGH (ref 0.2–1.2)
BUN SERPL-MCNC: 26 MG/DL — HIGH (ref 7–23)
CALCIUM SERPL-MCNC: 8.8 MG/DL — SIGNIFICANT CHANGE UP (ref 8.4–10.5)
CHLORIDE SERPL-SCNC: 104 MMOL/L — SIGNIFICANT CHANGE UP (ref 98–107)
CO2 SERPL-SCNC: 27 MMOL/L — SIGNIFICANT CHANGE UP (ref 22–31)
CREAT SERPL-MCNC: 0.89 MG/DL — SIGNIFICANT CHANGE UP (ref 0.5–1.3)
EOSINOPHIL # BLD AUTO: 0.1 K/UL — SIGNIFICANT CHANGE UP (ref 0–0.5)
EOSINOPHIL NFR BLD AUTO: 0.4 % — SIGNIFICANT CHANGE UP (ref 0–6)
EOSINOPHIL NFR FLD: 0 % — SIGNIFICANT CHANGE UP (ref 0–6)
GIANT PLATELETS BLD QL SMEAR: PRESENT — SIGNIFICANT CHANGE UP
GLUCOSE SERPL-MCNC: 87 MG/DL — SIGNIFICANT CHANGE UP (ref 70–99)
HAPTOGLOB SERPL-MCNC: < 20 MG/DL — LOW (ref 34–200)
HCT VFR BLD CALC: 23.2 % — LOW (ref 34.5–45)
HGB BLD-MCNC: 7.7 G/DL — LOW (ref 11.5–15.5)
IMM GRANULOCYTES # BLD AUTO: 1.69 # — SIGNIFICANT CHANGE UP
IMM GRANULOCYTES NFR BLD AUTO: 7.1 % — HIGH (ref 0–1.5)
LDH SERPL L TO P-CCNC: 707 U/L — HIGH (ref 135–225)
LYMPHOCYTES # BLD AUTO: 19.4 % — SIGNIFICANT CHANGE UP (ref 13–44)
LYMPHOCYTES # BLD AUTO: 4.61 K/UL — HIGH (ref 1–3.3)
LYMPHOCYTES NFR SPEC AUTO: 12 % — LOW (ref 13–44)
MACROCYTES BLD QL: SLIGHT — SIGNIFICANT CHANGE UP
MAGNESIUM SERPL-MCNC: 2 MG/DL — SIGNIFICANT CHANGE UP (ref 1.6–2.6)
MCHC RBC-ENTMCNC: 32.4 PG — SIGNIFICANT CHANGE UP (ref 27–34)
MCHC RBC-ENTMCNC: 33.2 % — SIGNIFICANT CHANGE UP (ref 32–36)
MCV RBC AUTO: 97.5 FL — SIGNIFICANT CHANGE UP (ref 80–100)
MICROCYTES BLD QL: SIGNIFICANT CHANGE UP
MONOCYTES # BLD AUTO: 1.32 K/UL — HIGH (ref 0–0.9)
MONOCYTES NFR BLD AUTO: 5.6 % — SIGNIFICANT CHANGE UP (ref 2–14)
MONOCYTES NFR BLD: 0.9 % — LOW (ref 2–9)
NEUTROPHIL AB SER-ACNC: 83.3 % — HIGH (ref 43–77)
NEUTROPHILS # BLD AUTO: 15.99 K/UL — HIGH (ref 1.8–7.4)
NEUTROPHILS NFR BLD AUTO: 67.2 % — SIGNIFICANT CHANGE UP (ref 43–77)
NEUTS BAND # BLD: 1.9 % — SIGNIFICANT CHANGE UP (ref 0–6)
NRBC # FLD: 5.68 — SIGNIFICANT CHANGE UP
NRBC FLD-RTO: 23.9 — SIGNIFICANT CHANGE UP
PHOSPHATE SERPL-MCNC: 2.5 MG/DL — SIGNIFICANT CHANGE UP (ref 2.5–4.5)
PLATELET # BLD AUTO: 282 K/UL — SIGNIFICANT CHANGE UP (ref 150–400)
PLATELET COUNT - ESTIMATE: NORMAL — SIGNIFICANT CHANGE UP
PMV BLD: 9.6 FL — SIGNIFICANT CHANGE UP (ref 7–13)
POIKILOCYTOSIS BLD QL AUTO: SLIGHT — SIGNIFICANT CHANGE UP
POLYCHROMASIA BLD QL SMEAR: SIGNIFICANT CHANGE UP
POTASSIUM SERPL-MCNC: 3.7 MMOL/L — SIGNIFICANT CHANGE UP (ref 3.5–5.3)
POTASSIUM SERPL-SCNC: 3.7 MMOL/L — SIGNIFICANT CHANGE UP (ref 3.5–5.3)
PROT SERPL-MCNC: 6.3 G/DL — SIGNIFICANT CHANGE UP (ref 6–8.3)
RBC # BLD: 2.38 M/UL — LOW (ref 3.8–5.2)
RBC # FLD: 27.5 % — HIGH (ref 10.3–14.5)
RETICS #: 521 K/UL — HIGH (ref 25–125)
RETICS/RBC NFR: 21.9 % — HIGH (ref 0.5–2.5)
SMUDGE CELLS # BLD: PRESENT — SIGNIFICANT CHANGE UP
SODIUM SERPL-SCNC: 143 MMOL/L — SIGNIFICANT CHANGE UP (ref 135–145)
URATE SERPL-MCNC: 7.1 MG/DL — HIGH (ref 2.5–7)
VARIANT LYMPHS # BLD: 1.9 % — SIGNIFICANT CHANGE UP
WBC # BLD: 23.78 K/UL — HIGH (ref 3.8–10.5)
WBC # FLD AUTO: 23.78 K/UL — HIGH (ref 3.8–10.5)

## 2018-05-06 PROCEDURE — 99233 SBSQ HOSP IP/OBS HIGH 50: CPT | Mod: GC

## 2018-05-06 RX ADMIN — Medication 90 MILLIGRAM(S): at 06:46

## 2018-05-06 RX ADMIN — Medication 145 MILLIGRAM(S): at 12:23

## 2018-05-06 RX ADMIN — CARVEDILOL PHOSPHATE 6.25 MILLIGRAM(S): 80 CAPSULE, EXTENDED RELEASE ORAL at 06:45

## 2018-05-06 RX ADMIN — CARVEDILOL PHOSPHATE 6.25 MILLIGRAM(S): 80 CAPSULE, EXTENDED RELEASE ORAL at 17:38

## 2018-05-06 RX ADMIN — LOSARTAN POTASSIUM 100 MILLIGRAM(S): 100 TABLET, FILM COATED ORAL at 06:45

## 2018-05-06 RX ADMIN — SENNA PLUS 2 TABLET(S): 8.6 TABLET ORAL at 22:32

## 2018-05-06 RX ADMIN — Medication 100 MILLIGRAM(S): at 06:45

## 2018-05-06 RX ADMIN — Medication 100 MILLIGRAM(S): at 17:38

## 2018-05-06 RX ADMIN — PANTOPRAZOLE SODIUM 40 MILLIGRAM(S): 20 TABLET, DELAYED RELEASE ORAL at 06:45

## 2018-05-06 NOTE — PROGRESS NOTE ADULT - PROBLEM SELECTOR PLAN 4
- C/w with Coreg, Fenofibrate, and ASA. Patient without complaints of chest pain or shortness of breath at this time.

## 2018-05-06 NOTE — PROGRESS NOTE ADULT - PROBLEM SELECTOR PLAN 5
- Patient borderline hypertensive over the past 24 hours. Will c/w home Losartan and Coreg and hold home HCTZ and Hydralazine for now.  - Will monitor BP and resume home BP meds as appropriate

## 2018-05-06 NOTE — PROGRESS NOTE ADULT - PROBLEM SELECTOR PLAN 6
- Patient endorses constipation this AM (last BM ~ 3-4 days ago). Will start Senna/Colace/Miralax  - Will continue to monitor - Patient endorses constipation this AM (last BM ~ 3-4 days ago). c/w Senna/Colace/Miralax  - Will continue to monitor

## 2018-05-06 NOTE — PROGRESS NOTE ADULT - PROBLEM SELECTOR PLAN 1
- Patient was found to have a Hgb of 6.3 on presentation (Hgb had been 12.0 on 1/18 per PMD). She was found to be Paolo positive a/w elevated T. 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   - Hematology following, appreciate recs. Patient underwent a CT C/A/P yesterday and the report mentioned the possibility of lymphoma or leukemia. However, the imaging was reviewed by Hematology who suspect this is unlikely. Flow cytometry of peripheral blood also returned grossly WNL.  - C/w Prednisone 90mg QD  - Patient was given 1U of pRBCs on 5/2 with improvement; however, her Hgb has since downtrended. Since her Hgb is 6.8 this AM, will transfuse 1U of pRBCs today.  - Monitor CBC. Keep active T+S - Patient was found to have a Hgb of 6.3 on presentation (Hgb had been 12.0 on 1/18 per PMD). She was found to be Paolo positive a/w elevated T. 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   - Hematology following, appreciate recs. Patient underwent a CT C/A/P yesterday and the report mentioned the possibility of lymphoma or leukemia. However, the imaging was reviewed by Hematology who suspect this is unlikely. Flow cytometry of peripheral blood also returned grossly WNL.  - C/w Prednisone 90mg QD  - Patient was given 1U of pRBCs on 5/2 with improvement; however, her Hgb has since downtrended. Since her Hgb is 6.8 yesterday, s/p 1U of pRBCs 5/5, repeat hgb 7.7  - Monitor CBC. Keep active T+S

## 2018-05-06 NOTE — PROGRESS NOTE ADULT - SUBJECTIVE AND OBJECTIVE BOX
CONTACT INFO:  Dale Resendiz MD  Internal Medicine PGY1  Pager:  [phone]/ [phone]    M-F 7am-7pm:  pager covered by day team     *Academic conferences M-F 8am-9am & 12pm-1pm - page ONLY if urgent or if consultant  M-F & Sa-Sun 7pm-7am:  NS  page 1443 for Teams 1-3, 1446 for Team 4 & Care Model/ [facility] Page [id number] for T 1-3 and [id number] for T4 &CM  Sa-Sun 7am-12pm:  see patient chart, primary physician assigned available 7am-12pm  Sa-Sun 12pm-7pm:  NS  page 1443 for Teams 1-4, [facility]  page Covering Resident    AUSTIN HAYNES  67y  Female    Patient is a 67y old  Female who presents with a chief complaint of Dizziness (04 May 2018 15:50)      INTERVAL HPI / OVERNIGHT EVENTS:      OBJECTIVE:  Telemetry (24 Hrs):     Vitals Signs (24 Hrs):  T(C): 36.9 (05-06-18 @ 06:41), Max: 37.5 (05-05-18 @ 22:52)  HR: 74 (05-06-18 @ 06:41) (72 - 74)  BP: 148/65 (05-06-18 @ 06:41) (136/64 - 151/64)  RR: 16 (05-06-18 @ 06:41) (16 - 18)  SpO2: 95% (05-06-18 @ 06:41) (95% - 97%)    PHYSICAL EXAM:  General: Comfortable, no apparent distress  HEENT: Atraumatic; EOMI, PERRLA, conjunctiva and sclera clear; no tonsillar erythema/exudates/enlargement  Neck: Supple; no JVD; thyroid normal without masses or enlargement  Chest/Lungs: Clear to auscultation B/L; no rales, rhonchi or wheezing  Heart: Regular rate and rhythm; normal S1/S2; no murmurs, rubs, or gallops  Abdomen: Soft, nontender, nondistended; bowel sounds present  Extremities: PT/DP pulses 2+ B/L; no edema  Skin: No rashes or lesions  Neurological: Alert and oriented to person/place/time    LABS:                        6.8    29.65 )-----------( 323      ( 05 May 2018 06:20 )             20.0               CAPILLARY BLOOD GLUCOSE              RADIOLOGY & ADDITIONAL TESTS:      MEDICATIONS:  carvedilol 6.25 milliGRAM(s) Oral every 12 hours  docusate sodium 100 milliGRAM(s) Oral two times a day  fenofibrate Tablet 145 milliGRAM(s) Oral daily  losartan 100 milliGRAM(s) Oral daily  pantoprazole    Tablet 40 milliGRAM(s) Oral before breakfast  polyethylene glycol 3350 17 Gram(s) Oral daily  predniSONE   Tablet 90 milliGRAM(s) Oral daily  senna 2 Tablet(s) Oral at bedtime  sodium chloride 0.9%. 1000 milliLiter(s) (50 mL/Hr) IV Continuous <Continuous>      ALLERGIES:  No Known Allergies CONTACT INFO:  Dale Resendiz MD  Internal Medicine PGY1  Pager:  [phone]/ [phone]    M-F 7am-7pm:  pager covered by day team     *Academic conferences M-F 8am-9am & 12pm-1pm - page ONLY if urgent or if consultant  M-F & Sa-Sun 7pm-7am:  NS  page 1443 for Teams 1-3, 1446 for Team 4 & Care Model/ [facility] Page [phone] for T 1-3 and [id number] for T4 &CM  Sa-Sun 7am-12pm:  see patient chart, primary physician assigned available 7am-12pm  Sa-Sun 12pm-7pm:  NS  page 1443 for Teams 1-4, [facility]  page Covering Resident    AUSTIN HAYNES  67y  Female    Patient is a 67y old  Female who presents with a chief complaint of Dizziness (04 May 2018 15:50)      INTERVAL HPI / OVERNIGHT EVENTS: NAEON, pt afebrile VSS, Pt felt well overnight, had some mild right leg pain which improved with tylenol. pt denies cp, sob, f/c, n/v.      OBJECTIVE:  Telemetry (24 Hrs):     Vitals Signs (24 Hrs):  T(C): 36.9 (05-06-18 @ 06:41), Max: 37.5 (05-05-18 @ 22:52)  HR: 74 (05-06-18 @ 06:41) (72 - 74)  BP: 148/65 (05-06-18 @ 06:41) (136/64 - 151/64)  RR: 16 (05-06-18 @ 06:41) (16 - 18)  SpO2: 95% (05-06-18 @ 06:41) (95% - 97%)    PHYSICAL EXAM:  GENERAL: NAD, well-developed, ambulating well  HEAD:  Atraumatic, Normocephalic  EYES: EOMI, PERRLA, conjunctiva and minimal sclera icterus +  NECK: Supple, No JVD  CHEST/LUNG: Clear to auscultation bilaterally; No wheeze  HEART: Regular rate and rhythm; No murmurs, rubs, or gallops  ABDOMEN: Soft, Nontender, Nondistended; Bowel sounds present  EXTREMITIES:  2+ Peripheral Pulses, No clubbing, cyanosis, or edema  PSYCH: AAOx3  NEUROLOGY: non-focal AOx3  SKIN: No rashes or lesions    LABS:                        6.8    29.65 )-----------( 323      ( 05 May 2018 06:20 )             20.0               CAPILLARY BLOOD GLUCOSE        Imaging Personally Reviewed:  CT C/A/P (5/4): Small indeterminate peripheral opacities in the left upper lobe and right middle lobe. Heterogeneous enhancement of the kidneys bilaterally with striated nephrograms. Differential diagnosis includes infection, ischemia, and neoplastic etiologies such as lymphoma and leukemia. Indeterminate hypodense thyroid lesions which are further evaluated with ultrasound.    CT Neck Soft Tissue (5/4): Left thyroid nodule for which thyroid sonography is advised for further evaluation as clinically warranted. Otherwise unremarkable exam without other evidence of cervical mass or adenopathy.        MEDICATIONS:  carvedilol 6.25 milliGRAM(s) Oral every 12 hours  docusate sodium 100 milliGRAM(s) Oral two times a day  fenofibrate Tablet 145 milliGRAM(s) Oral daily  losartan 100 milliGRAM(s) Oral daily  pantoprazole    Tablet 40 milliGRAM(s) Oral before breakfast  polyethylene glycol 3350 17 Gram(s) Oral daily  predniSONE   Tablet 90 milliGRAM(s) Oral daily  senna 2 Tablet(s) Oral at bedtime  sodium chloride 0.9%. 1000 milliLiter(s) (50 mL/Hr) IV Continuous <Continuous>      ALLERGIES:  No Known Allergies

## 2018-05-06 NOTE — PROGRESS NOTE ADULT - PROBLEM SELECTOR PLAN 3
- Patient noted to have a left thyroid nodule on CT scan yesterday. Since she will likely be in the hospital for the next few days, will check a Thyroid U/S  - Will continue to monitor

## 2018-05-06 NOTE — PROVIDER CONTACT NOTE (OTHER) - BACKGROUND
Patient was admitted with anemia and has a history of hypertension. She's had episodes of bradycardia.

## 2018-05-06 NOTE — PROGRESS NOTE ADULT - PROBLEM SELECTOR PLAN 2
- Patient noted to have a leukocytosis on blood work. She has otherwise been afebrile and without signs or symptoms of infection. Her increased WBC may also be falsely elevated 2/2 being on high dose steroids.  - Will continue to monitor off antibiotics. If patient were to spike a fever, will check blood cultures.  - Monitor CBC and fever curve

## 2018-05-07 LAB
ALBUMIN SERPL ELPH-MCNC: 3.4 G/DL — SIGNIFICANT CHANGE UP (ref 3.3–5)
ALP SERPL-CCNC: 68 U/L — SIGNIFICANT CHANGE UP (ref 40–120)
ALT FLD-CCNC: 19 U/L — SIGNIFICANT CHANGE UP (ref 4–33)
AST SERPL-CCNC: 35 U/L — HIGH (ref 4–32)
BASOPHILS # BLD AUTO: 0.05 K/UL — SIGNIFICANT CHANGE UP (ref 0–0.2)
BASOPHILS NFR BLD AUTO: 0.2 % — SIGNIFICANT CHANGE UP (ref 0–2)
BILIRUB SERPL-MCNC: 3.8 MG/DL — HIGH (ref 0.2–1.2)
BUN SERPL-MCNC: 23 MG/DL — SIGNIFICANT CHANGE UP (ref 7–23)
CALCIUM SERPL-MCNC: 8.5 MG/DL — SIGNIFICANT CHANGE UP (ref 8.4–10.5)
CHLORIDE SERPL-SCNC: 103 MMOL/L — SIGNIFICANT CHANGE UP (ref 98–107)
CO2 SERPL-SCNC: 27 MMOL/L — SIGNIFICANT CHANGE UP (ref 22–31)
CREAT SERPL-MCNC: 0.88 MG/DL — SIGNIFICANT CHANGE UP (ref 0.5–1.3)
EOSINOPHIL # BLD AUTO: 0.1 K/UL — SIGNIFICANT CHANGE UP (ref 0–0.5)
EOSINOPHIL NFR BLD AUTO: 0.5 % — SIGNIFICANT CHANGE UP (ref 0–6)
GLUCOSE SERPL-MCNC: 87 MG/DL — SIGNIFICANT CHANGE UP (ref 70–99)
HAPTOGLOB SERPL-MCNC: < 20 MG/DL — LOW (ref 34–200)
HCT VFR BLD CALC: 23.1 % — LOW (ref 34.5–45)
HGB BLD-MCNC: 7.6 G/DL — LOW (ref 11.5–15.5)
IMM GRANULOCYTES # BLD AUTO: 1.47 # — SIGNIFICANT CHANGE UP
IMM GRANULOCYTES NFR BLD AUTO: 6.7 % — HIGH (ref 0–1.5)
LDH SERPL L TO P-CCNC: 737 U/L — HIGH (ref 135–225)
LYMPHOCYTES # BLD AUTO: 19.8 % — SIGNIFICANT CHANGE UP (ref 13–44)
LYMPHOCYTES # BLD AUTO: 4.33 K/UL — HIGH (ref 1–3.3)
MAGNESIUM SERPL-MCNC: 2.1 MG/DL — SIGNIFICANT CHANGE UP (ref 1.6–2.6)
MCHC RBC-ENTMCNC: 32.9 % — SIGNIFICANT CHANGE UP (ref 32–36)
MCHC RBC-ENTMCNC: 32.9 PG — SIGNIFICANT CHANGE UP (ref 27–34)
MCV RBC AUTO: 100 FL — SIGNIFICANT CHANGE UP (ref 80–100)
MONOCYTES # BLD AUTO: 1.12 K/UL — HIGH (ref 0–0.9)
MONOCYTES NFR BLD AUTO: 5.1 % — SIGNIFICANT CHANGE UP (ref 2–14)
NEUTROPHILS # BLD AUTO: 14.82 K/UL — HIGH (ref 1.8–7.4)
NEUTROPHILS NFR BLD AUTO: 67.7 % — SIGNIFICANT CHANGE UP (ref 43–77)
NRBC # FLD: 4.94 — SIGNIFICANT CHANGE UP
NRBC FLD-RTO: 22.6 — SIGNIFICANT CHANGE UP
PHOSPHATE SERPL-MCNC: 3.1 MG/DL — SIGNIFICANT CHANGE UP (ref 2.5–4.5)
PLATELET # BLD AUTO: 241 K/UL — SIGNIFICANT CHANGE UP (ref 150–400)
PMV BLD: 9.7 FL — SIGNIFICANT CHANGE UP (ref 7–13)
POTASSIUM SERPL-MCNC: 3.6 MMOL/L — SIGNIFICANT CHANGE UP (ref 3.5–5.3)
POTASSIUM SERPL-SCNC: 3.6 MMOL/L — SIGNIFICANT CHANGE UP (ref 3.5–5.3)
PROT SERPL-MCNC: 6.1 G/DL — SIGNIFICANT CHANGE UP (ref 6–8.3)
RBC # BLD: 2.31 M/UL — LOW (ref 3.8–5.2)
RBC # FLD: 28.4 % — HIGH (ref 10.3–14.5)
RETICS #: 605 K/UL — HIGH (ref 25–125)
RETICS/RBC NFR: > 22.2 % — HIGH (ref 0.5–2.5)
SODIUM SERPL-SCNC: 142 MMOL/L — SIGNIFICANT CHANGE UP (ref 135–145)
URATE SERPL-MCNC: 7.1 MG/DL — HIGH (ref 2.5–7)
WBC # BLD: 21.89 K/UL — HIGH (ref 3.8–10.5)
WBC # FLD AUTO: 21.89 K/UL — HIGH (ref 3.8–10.5)

## 2018-05-07 PROCEDURE — 93971 EXTREMITY STUDY: CPT | Mod: 26,LT

## 2018-05-07 PROCEDURE — 76536 US EXAM OF HEAD AND NECK: CPT | Mod: 26

## 2018-05-07 PROCEDURE — 99233 SBSQ HOSP IP/OBS HIGH 50: CPT | Mod: GC

## 2018-05-07 RX ORDER — FOLIC ACID 0.8 MG
1 TABLET ORAL DAILY
Qty: 0 | Refills: 0 | Status: DISCONTINUED | OUTPATIENT
Start: 2018-05-07 | End: 2018-05-09

## 2018-05-07 RX ADMIN — Medication 90 MILLIGRAM(S): at 05:59

## 2018-05-07 RX ADMIN — Medication 145 MILLIGRAM(S): at 14:13

## 2018-05-07 RX ADMIN — CARVEDILOL PHOSPHATE 6.25 MILLIGRAM(S): 80 CAPSULE, EXTENDED RELEASE ORAL at 05:59

## 2018-05-07 RX ADMIN — Medication 1 MILLIGRAM(S): at 14:13

## 2018-05-07 RX ADMIN — Medication 100 MILLIGRAM(S): at 05:59

## 2018-05-07 RX ADMIN — LOSARTAN POTASSIUM 100 MILLIGRAM(S): 100 TABLET, FILM COATED ORAL at 06:00

## 2018-05-07 RX ADMIN — SENNA PLUS 2 TABLET(S): 8.6 TABLET ORAL at 22:49

## 2018-05-07 RX ADMIN — CARVEDILOL PHOSPHATE 6.25 MILLIGRAM(S): 80 CAPSULE, EXTENDED RELEASE ORAL at 17:45

## 2018-05-07 RX ADMIN — PANTOPRAZOLE SODIUM 40 MILLIGRAM(S): 20 TABLET, DELAYED RELEASE ORAL at 06:01

## 2018-05-07 RX ADMIN — Medication 100 MILLIGRAM(S): at 17:45

## 2018-05-07 NOTE — PROGRESS NOTE ADULT - SUBJECTIVE AND OBJECTIVE BOX
CONTACT INFO:  Dale Resendiz MD  Internal Medicine PGY1  Pager:  [phone]/ [phone]    M-F 7am-7pm:  pager covered by day team     *Academic conferences M-F 8am-9am & 12pm-1pm - page ONLY if urgent or if consultant  M-F & Sa-Sun 7pm-7am:  NS  page 1443 for Teams 1-3, 1446 for Team 4 & Care Model/ [facility] Page 42186 for T 1-3 and 25312 for T4 &CM  Sa-Sun 7am-12pm:  see patient chart, primary physician assigned available 7am-12pm  Sa-Sun 12pm-7pm:  NS  page 1443 for Teams 1-4, [facility]  page Covering Resident    AUSTIN HAYNES  67y  Female    Patient is a 67y old  Female who presents with a chief complaint of Dizziness (04 May 2018 15:50)      INTERVAL HPI / OVERNIGHT EVENTS:      OBJECTIVE:  Telemetry (24 Hrs):     Vitals Signs (24 Hrs):  T(C): 36.5 (05-07-18 @ 05:53), Max: 36.7 (05-06-18 @ 14:55)  HR: 57 (05-07-18 @ 05:53) (53 - 67)  BP: 187/59 (05-07-18 @ 05:53) (151/62 - 187/59)  RR: 18 (05-07-18 @ 05:53) (18 - 18)  SpO2: 94% (05-07-18 @ 05:53) (94% - 95%)    PHYSICAL EXAM:  General: Comfortable, no apparent distress  HEENT: Atraumatic; EOMI, PERRLA, conjunctiva and sclera clear; no tonsillar erythema/exudates/enlargement  Neck: Supple; no JVD; thyroid normal without masses or enlargement  Chest/Lungs: Clear to auscultation B/L; no rales, rhonchi or wheezing  Heart: Regular rate and rhythm; normal S1/S2; no murmurs, rubs, or gallops  Abdomen: Soft, nontender, nondistended; bowel sounds present  Extremities: PT/DP pulses 2+ B/L; no edema  Skin: No rashes or lesions  Neurological: Alert and oriented to person/place/time    LABS:                        7.7    23.78 )-----------( 282      ( 06 May 2018 05:20 )             23.2     05-06    143  |  104  |  26<H>  ----------------------------<  87  3.7   |  27  |  0.89    Ca    8.8      06 May 2018 05:20  Phos  2.5     05-06  Mg     2.0     05-06    TPro  6.3  /  Alb  3.4  /  TBili  3.9<H>  /  DBili  x   /  AST  36<H>  /  ALT  20  /  AlkPhos  67  05-06        CAPILLARY BLOOD GLUCOSE              RADIOLOGY & ADDITIONAL TESTS:      MEDICATIONS:  carvedilol 6.25 milliGRAM(s) Oral every 12 hours  docusate sodium 100 milliGRAM(s) Oral two times a day  fenofibrate Tablet 145 milliGRAM(s) Oral daily  losartan 100 milliGRAM(s) Oral daily  pantoprazole    Tablet 40 milliGRAM(s) Oral before breakfast  polyethylene glycol 3350 17 Gram(s) Oral daily  predniSONE   Tablet 90 milliGRAM(s) Oral daily  senna 2 Tablet(s) Oral at bedtime  sodium chloride 0.9%. 1000 milliLiter(s) (50 mL/Hr) IV Continuous <Continuous>      ALLERGIES:  No Known Allergies CONTACT INFO:  Dale Resendiz MD  Internal Medicine PGY1  Pager:  [phone]/ [phone]    M-F 7am-7pm:  pager covered by day team     *Academic conferences M-F 8am-9am & 12pm-1pm - page ONLY if urgent or if consultant  M-F & Sa-Sun 7pm-7am:  NS  page 1443 for Teams 1-3, 1446 for Team 4 & Care Model/ [facility] Page 64964 for T 1-3 and 50783 for T4 &CM  Sa-Sun 7am-12pm:  see patient chart, primary physician assigned available 7am-12pm  Sa-Sun 12pm-7pm:  NS  page 1443 for Teams 1-4, [facility]  page Covering Resident    AUSTIN HAYNES  67y  Female    Patient is a 67y old  Female who presents with a chief complaint of Dizziness (04 May 2018 15:50)      INTERVAL HPI / OVERNIGHT EVENTS: NAEON, pt afebrile VSS, Pt felt well overnight, had some mild right leg pain which improved with tylenol. pt denies cp, sob, f/c, n/v.      OBJECTIVE:  Telemetry (24 Hrs):     Vitals Signs (24 Hrs):  T(C): 36.5 (05-07-18 @ 05:53), Max: 36.7 (05-06-18 @ 14:55)  HR: 57 (05-07-18 @ 05:53) (53 - 67)  BP: 187/59 (05-07-18 @ 05:53) (151/62 - 187/59)  RR: 18 (05-07-18 @ 05:53) (18 - 18)  SpO2: 94% (05-07-18 @ 05:53) (94% - 95%)    PHYSICAL EXAM:  GENERAL: NAD, well-developed, ambulating well  HEAD:  Atraumatic, Normocephalic  EYES: EOMI, PERRLA, conjunctiva and minimal sclera icterus +  NECK: Supple, No JVD  CHEST/LUNG: Clear to auscultation bilaterally; No wheeze  HEART: Regular rate and rhythm; No murmurs, rubs, or gallops  ABDOMEN: Soft, Nontender, Nondistended; Bowel sounds present  EXTREMITIES:  2+ Peripheral Pulses, No clubbing, cyanosis, RLE swelling  PSYCH: AAOx3  NEUROLOGY: non-focal AOx3  SKIN: No rashes or lesions    LABS:                        7.7    23.78 )-----------( 282      ( 06 May 2018 05:20 )             23.2     05-06    143  |  104  |  26<H>  ----------------------------<  87  3.7   |  27  |  0.89    Ca    8.8      06 May 2018 05:20  Phos  2.5     05-06  Mg     2.0     05-06    TPro  6.3  /  Alb  3.4  /  TBili  3.9<H>  /  DBili  x   /  AST  36<H>  /  ALT  20  /  AlkPhos  67  05-06        CAPILLARY BLOOD GLUCOSE              RADIOLOGY & ADDITIONAL TESTS:  < from: US Thyroid + Parathyroid (05.07.18 @ 07:57) >  FINDINGS:    Right Lobe: 3.9 x 0.9 x1.4 cm. Hypoechoic nodule in the midpole,   measuring 0.5 x 0.4 x 0.5 cm.    Left Lobe: 3.3 x 1.3 x 1.1 cm. Left upper pole cyst, measuring 1.5 x 1.1   x 0.9 cm.    Isthmus: 2 mm.     Cervical Lymph Nodes: No enlarged or abnormal morphology cervical nodes.    IMPRESSION:     Thyroid nodules as described above, for which follow-up with thyroid   ultrasound is recommended in one year.        MEDICATIONS:  carvedilol 6.25 milliGRAM(s) Oral every 12 hours  docusate sodium 100 milliGRAM(s) Oral two times a day  fenofibrate Tablet 145 milliGRAM(s) Oral daily  losartan 100 milliGRAM(s) Oral daily  pantoprazole    Tablet 40 milliGRAM(s) Oral before breakfast  polyethylene glycol 3350 17 Gram(s) Oral daily  predniSONE   Tablet 90 milliGRAM(s) Oral daily  senna 2 Tablet(s) Oral at bedtime  sodium chloride 0.9%. 1000 milliLiter(s) (50 mL/Hr) IV Continuous <Continuous>      ALLERGIES:  No Known Allergies

## 2018-05-07 NOTE — PROGRESS NOTE ADULT - PROBLEM SELECTOR PLAN 1
- Patient was found to have a Hgb of 6.3 on presentation (Hgb had been 12.0 on 1/18 per PMD). She was found to be Paolo positive a/w elevated T. 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   - Hematology following, appreciate recs. Patient underwent a CT C/A/P yesterday and the report mentioned the possibility of lymphoma or leukemia. However, the imaging was reviewed by Hematology who suspect this is unlikely. Flow cytometry of peripheral blood also returned grossly WNL.  - C/w Prednisone 90mg QD  - Patient was given 1U of pRBCs on 5/2 with improvement; however, her Hgb has since downtrended. Since her Hgb is 6.8 yesterday, s/p 1U of pRBCs 5/5, repeat hgb 7.7  - Monitor CBC. Keep active T+S

## 2018-05-07 NOTE — PROGRESS NOTE ADULT - PROBLEM SELECTOR PLAN 6
- Patient endorses constipation this AM (last BM ~ 3-4 days ago). c/w Senna/Colace/Miralax  - Will continue to monitor

## 2018-05-07 NOTE — PROGRESS NOTE ADULT - PROBLEM SELECTOR PLAN 3
- Patient noted to have a left thyroid nodule on CT scan yesterday. Since she will likely be in the hospital for the next few days, will check a Thyroid U/S  - Will continue to monitor - Patient noted to have a left thyroid nodule on CT scan yesterday. Since she will likely be in the hospital for the next few days,  Thyroid U/S - f/u thyroid US in 1 year  - Will continue to monitor

## 2018-05-08 LAB
BASOPHILS # BLD AUTO: 0.03 K/UL — SIGNIFICANT CHANGE UP (ref 0–0.2)
BASOPHILS NFR BLD AUTO: 0.2 % — SIGNIFICANT CHANGE UP (ref 0–2)
EOSINOPHIL # BLD AUTO: 0.16 K/UL — SIGNIFICANT CHANGE UP (ref 0–0.5)
EOSINOPHIL NFR BLD AUTO: 0.9 % — SIGNIFICANT CHANGE UP (ref 0–6)
HCT VFR BLD CALC: 21.5 % — LOW (ref 34.5–45)
HGB BLD-MCNC: 7.1 G/DL — LOW (ref 11.5–15.5)
IMM GRANULOCYTES # BLD AUTO: 0.97 # — SIGNIFICANT CHANGE UP
IMM GRANULOCYTES NFR BLD AUTO: 5.3 % — HIGH (ref 0–1.5)
LYMPHOCYTES # BLD AUTO: 20.8 % — SIGNIFICANT CHANGE UP (ref 13–44)
LYMPHOCYTES # BLD AUTO: 3.82 K/UL — HIGH (ref 1–3.3)
MCHC RBC-ENTMCNC: 33 % — SIGNIFICANT CHANGE UP (ref 32–36)
MCHC RBC-ENTMCNC: 34.1 PG — HIGH (ref 27–34)
MCV RBC AUTO: 103.4 FL — HIGH (ref 80–100)
MONOCYTES # BLD AUTO: 0.93 K/UL — HIGH (ref 0–0.9)
MONOCYTES NFR BLD AUTO: 5.1 % — SIGNIFICANT CHANGE UP (ref 2–14)
NEUTROPHILS # BLD AUTO: 12.48 K/UL — HIGH (ref 1.8–7.4)
NEUTROPHILS NFR BLD AUTO: 67.7 % — SIGNIFICANT CHANGE UP (ref 43–77)
NRBC # FLD: 2.93 — SIGNIFICANT CHANGE UP
NRBC FLD-RTO: 15.9 — SIGNIFICANT CHANGE UP
PLATELET # BLD AUTO: 198 K/UL — SIGNIFICANT CHANGE UP (ref 150–400)
PMV BLD: 9.7 FL — SIGNIFICANT CHANGE UP (ref 7–13)
RBC # BLD: 2.08 M/UL — LOW (ref 3.8–5.2)
RBC # FLD: 29.6 % — HIGH (ref 10.3–14.5)
WBC # BLD: 18.39 K/UL — HIGH (ref 3.8–10.5)
WBC # FLD AUTO: 18.39 K/UL — HIGH (ref 3.8–10.5)

## 2018-05-08 PROCEDURE — 99233 SBSQ HOSP IP/OBS HIGH 50: CPT | Mod: GC

## 2018-05-08 PROCEDURE — 99232 SBSQ HOSP IP/OBS MODERATE 35: CPT | Mod: GC

## 2018-05-08 RX ORDER — ACETAMINOPHEN 500 MG
650 TABLET ORAL ONCE
Qty: 0 | Refills: 0 | Status: COMPLETED | OUTPATIENT
Start: 2018-05-08 | End: 2018-05-08

## 2018-05-08 RX ADMIN — Medication 1 MILLIGRAM(S): at 12:59

## 2018-05-08 RX ADMIN — Medication 145 MILLIGRAM(S): at 12:59

## 2018-05-08 RX ADMIN — SENNA PLUS 2 TABLET(S): 8.6 TABLET ORAL at 21:23

## 2018-05-08 RX ADMIN — Medication 90 MILLIGRAM(S): at 06:20

## 2018-05-08 RX ADMIN — LOSARTAN POTASSIUM 100 MILLIGRAM(S): 100 TABLET, FILM COATED ORAL at 06:20

## 2018-05-08 RX ADMIN — Medication 100 MILLIGRAM(S): at 18:16

## 2018-05-08 RX ADMIN — PANTOPRAZOLE SODIUM 40 MILLIGRAM(S): 20 TABLET, DELAYED RELEASE ORAL at 06:20

## 2018-05-08 RX ADMIN — Medication 650 MILLIGRAM(S): at 20:30

## 2018-05-08 RX ADMIN — Medication 100 MILLIGRAM(S): at 06:20

## 2018-05-08 RX ADMIN — Medication 650 MILLIGRAM(S): at 19:30

## 2018-05-08 NOTE — PROGRESS NOTE ADULT - PROBLEM SELECTOR PLAN 6
- Patient endorses constipation this AM (last BM ~ 3-4 days ago). c/w Senna/Colace/Miralax  - Will continue to monitor - Patient endorses constipation(last BM ~ 3-4 days ago). c/w Senna/Colace/Miralax  - Will continue to monitor c/w Senna/Colace/Miralax PRN  - Will continue to monitor

## 2018-05-08 NOTE — PROGRESS NOTE ADULT - SUBJECTIVE AND OBJECTIVE BOX
INTERVAL HPI/OVERNIGHT EVENTS:  Patient S&E at bedside. No o/n events, patient resting comfortably.     VITAL SIGNS:  T(F): 98.8 (05-08-18 @ 06:17)  HR: 50 (05-08-18 @ 06:17)  BP: 149/58 (05-08-18 @ 06:17)  RR: 17 (05-08-18 @ 06:17)  SpO2: 95% (05-08-18 @ 06:17)  Wt(kg): --    PHYSICAL EXAM:    Constitutional: NAD  Eyes: EOMI, conjunctival pallor  Neck: supple, no JVD  Respiratory: CTA b/l, good air entry b/l  Cardiovascular: +S1S2 bradycardic, no M/R/G  Gastrointestinal: soft, NTND, + BS  Extremities: no c/c/e  Neurological: AAOx3    MEDICATIONS  (STANDING):  carvedilol 6.25 milliGRAM(s) Oral every 12 hours  docusate sodium 100 milliGRAM(s) Oral two times a day  fenofibrate Tablet 145 milliGRAM(s) Oral daily  folic acid 1 milliGRAM(s) Oral daily  losartan 100 milliGRAM(s) Oral daily  pantoprazole    Tablet 40 milliGRAM(s) Oral before breakfast  polyethylene glycol 3350 17 Gram(s) Oral daily  predniSONE   Tablet 90 milliGRAM(s) Oral daily  senna 2 Tablet(s) Oral at bedtime  sodium chloride 0.9%. 1000 milliLiter(s) (50 mL/Hr) IV Continuous <Continuous>    MEDICATIONS  (PRN):    Allergies  No Known Allergies    LABS:                        7.1    18.39 )-----------( 198      ( 08 May 2018 05:30 )             21.5     05-07    142  |  103  |  23  ----------------------------<  87  3.6   |  27  |  0.88    Ca    8.5      07 May 2018 06:00  Phos  3.1     05-07  Mg     2.1     05-07    TPro  6.1  /  Alb  3.4  /  TBili  3.8<H>  /  DBili  x   /  AST  35<H>  /  ALT  19  /  AlkPhos  68  05-07    RADIOLOGY & ADDITIONAL TESTS:  Studies reviewed.    ASSESSMENT & PLAN:

## 2018-05-08 NOTE — PROVIDER CONTACT NOTE (OTHER) - ASSESSMENT
Patient awake and verbally responsive, no reports of pain or discomfort, no signs of acute distress.

## 2018-05-08 NOTE — PROGRESS NOTE ADULT - PROBLEM SELECTOR PLAN 3
- Patient noted to have a left thyroid nodule on CT scan yesterday. Since she will likely be in the hospital for the next few days,  Thyroid U/S - f/u thyroid US in 1 year  - Will continue to monitor

## 2018-05-08 NOTE — PROGRESS NOTE ADULT - SUBJECTIVE AND OBJECTIVE BOX
CONTACT INFO:  Dale Resendiz MD  Internal Medicine PGY1  Pager:  [phone]/ [phone]    M-F 7am-7pm:  pager covered by day team     *Academic conferences M-F 8am-9am & 12pm-1pm - page ONLY if urgent or if consultant  M-F & Sa-Sun 7pm-7am:  NS  page 1443 for Teams 1-3, 1446 for Team 4 & Care Model/ [facility] Page 15121 for T 1-3 and 49680 for T4 &CM  Sa-Sun 7am-12pm:  see patient chart, primary physician assigned available 7am-12pm  Sa-Sun 12pm-7pm:  NS  page 1443 for Teams 1-4, [facility]  page Covering Resident    AUSTIN HAYNES  67y  Female    Patient is a 67y old  Female who presents with a chief complaint of Dizziness (04 May 2018 15:50)      INTERVAL HPI / OVERNIGHT EVENTS:      OBJECTIVE:  Telemetry (24 Hrs):     Vitals Signs (24 Hrs):  T(C): 37.1 (05-08-18 @ 06:17), Max: 37.1 (05-08-18 @ 06:17)  HR: 50 (05-08-18 @ 06:17) (50 - 65)  BP: 149/58 (05-08-18 @ 06:17) (148/61 - 163/73)  RR: 17 (05-08-18 @ 06:17) (17 - 18)  SpO2: 95% (05-08-18 @ 06:17) (95% - 96%)    PHYSICAL EXAM:  General: Comfortable, no apparent distress  HEENT: Atraumatic; EOMI, PERRLA, conjunctiva and sclera clear; no tonsillar erythema/exudates/enlargement  Neck: Supple; no JVD; thyroid normal without masses or enlargement  Chest/Lungs: Clear to auscultation B/L; no rales, rhonchi or wheezing  Heart: Regular rate and rhythm; normal S1/S2; no murmurs, rubs, or gallops  Abdomen: Soft, nontender, nondistended; bowel sounds present  Extremities: PT/DP pulses 2+ B/L; no edema  Skin: No rashes or lesions  Neurological: Alert and oriented to person/place/time    LABS:                        7.1    18.39 )-----------( 198      ( 08 May 2018 05:30 )             21.5     05-07    142  |  103  |  23  ----------------------------<  87  3.6   |  27  |  0.88    Ca    8.5      07 May 2018 06:00  Phos  3.1     05-07  Mg     2.1     05-07    TPro  6.1  /  Alb  3.4  /  TBili  3.8<H>  /  DBili  x   /  AST  35<H>  /  ALT  19  /  AlkPhos  68  05-07        CAPILLARY BLOOD GLUCOSE              RADIOLOGY & ADDITIONAL TESTS:      MEDICATIONS:  carvedilol 6.25 milliGRAM(s) Oral every 12 hours  docusate sodium 100 milliGRAM(s) Oral two times a day  fenofibrate Tablet 145 milliGRAM(s) Oral daily  folic acid 1 milliGRAM(s) Oral daily  losartan 100 milliGRAM(s) Oral daily  pantoprazole    Tablet 40 milliGRAM(s) Oral before breakfast  polyethylene glycol 3350 17 Gram(s) Oral daily  predniSONE   Tablet 90 milliGRAM(s) Oral daily  senna 2 Tablet(s) Oral at bedtime  sodium chloride 0.9%. 1000 milliLiter(s) (50 mL/Hr) IV Continuous <Continuous>      ALLERGIES:  No Known Allergies CONTACT INFO:  Dale Resendiz MD  Internal Medicine PGY1  Pager:  [phone]/ [phone]    M-F 7am-7pm:  pager covered by day team     *Academic conferences M-F 8am-9am & 12pm-1pm - page ONLY if urgent or if consultant  M-F & Sa-Sun 7pm-7am:  NS  page 1443 for Teams 1-3, 1446 for Team 4 & Care Model/ [facility] Page 10082 for T 1-3 and 50732 for T4 &CM  Sa-Sun 7am-12pm:  see patient chart, primary physician assigned available 7am-12pm  Sa-Sun 12pm-7pm:  NS  page 1443 for Teams 1-4, [facility]  page Covering Resident    AUSTIN HAYNES  67y  Female    Patient is a 67y old  Female who presents with a chief complaint of Dizziness (04 May 2018 15:50)      INTERVAL HPI / OVERNIGHT EVENTS: NAEON, pt afebrile VSS, Pt felt well overnight, had some mild right leg pain which improved with tylenol. pt denies cp, sob, f/c, n/v.      OBJECTIVE:  Telemetry (24 Hrs):     Vitals Signs (24 Hrs):  T(C): 37.1 (05-08-18 @ 06:17), Max: 37.1 (05-08-18 @ 06:17)  HR: 50 (05-08-18 @ 06:17) (50 - 65)  BP: 149/58 (05-08-18 @ 06:17) (148/61 - 163/73)  RR: 17 (05-08-18 @ 06:17) (17 - 18)  SpO2: 95% (05-08-18 @ 06:17) (95% - 96%)    PHYSICAL EXAM:  GENERAL: NAD, well-developed, ambulating well  HEAD:  Atraumatic, Normocephalic  EYES: EOMI, PERRLA, conjunctiva and minimal sclera icterus +  NECK: Supple, No JVD  CHEST/LUNG: Clear to auscultation bilaterally; No wheeze  HEART: Regular rate and rhythm; No murmurs, rubs, or gallops  ABDOMEN: Soft, Nontender, Nondistended; Bowel sounds present  EXTREMITIES:  2+ Peripheral Pulses, No clubbing, cyanosis, RLE swelling  PSYCH: AAOx3  NEUROLOGY: non-focal AOx3  SKIN: No rashes or lesions, jaundice    LABS:                        7.1    18.39 )-----------( 198      ( 08 May 2018 05:30 )             21.5     05-07    142  |  103  |  23  ----------------------------<  87  3.6   |  27  |  0.88    Ca    8.5      07 May 2018 06:00  Phos  3.1     05-07  Mg     2.1     05-07    TPro  6.1  /  Alb  3.4  /  TBili  3.8<H>  /  DBili  x   /  AST  35<H>  /  ALT  19  /  AlkPhos  68  05-07        CAPILLARY BLOOD GLUCOSE              RADIOLOGY & ADDITIONAL TESTS:  < from:  Duplex Venous Lower Ext Ltd, Right (05.07.18 @ 13:00) >  IMPRESSION:     No evidence of right lower extremity deep venous thrombosis.    < end of copied text >      MEDICATIONS:  carvedilol 6.25 milliGRAM(s) Oral every 12 hours  docusate sodium 100 milliGRAM(s) Oral two times a day  fenofibrate Tablet 145 milliGRAM(s) Oral daily  folic acid 1 milliGRAM(s) Oral daily  losartan 100 milliGRAM(s) Oral daily  pantoprazole    Tablet 40 milliGRAM(s) Oral before breakfast  polyethylene glycol 3350 17 Gram(s) Oral daily  predniSONE   Tablet 90 milliGRAM(s) Oral daily  senna 2 Tablet(s) Oral at bedtime  sodium chloride 0.9%. 1000 milliLiter(s) (50 mL/Hr) IV Continuous <Continuous>      ALLERGIES:  No Known Allergies CONTACT INFO:  Dale Resendiz MD  Internal Medicine PGY1  Pager:  [phone]/ [phone]    M-F 7am-7pm:  pager covered by day team     *Academic conferences M-F 8am-9am & 12pm-1pm - page ONLY if urgent or if consultant  M-F & Sa-Sun 7pm-7am:  NS  page 1443 for Teams 1-3, 1446 for Team 4 & Care Model/ [facility] Page 34812 for T 1-3 and 42117 for T4 &CM  Sa-Sun 7am-12pm:  see patient chart, primary physician assigned available 7am-12pm  Sa-Sun 12pm-7pm:  NS  page 1443 for Teams 1-4, [facility]  page Covering Resident    AUSTIN HAYNES  67y  Female    Patient is a 67y old  Female who presents with a chief complaint of Dizziness (04 May 2018 15:50)      INTERVAL HPI / OVERNIGHT EVENTS: NAEON, pt afebrile VSS, Pt felt well overnight. pt denies cp, sob, f/c, n/v.      OBJECTIVE:  Telemetry (24 Hrs):     Vitals Signs (24 Hrs):  T(C): 37.1 (05-08-18 @ 06:17), Max: 37.1 (05-08-18 @ 06:17)  HR: 50 (05-08-18 @ 06:17) (50 - 65)  BP: 149/58 (05-08-18 @ 06:17) (148/61 - 163/73)  RR: 17 (05-08-18 @ 06:17) (17 - 18)  SpO2: 95% (05-08-18 @ 06:17) (95% - 96%)    PHYSICAL EXAM:  GENERAL: NAD, well-developed, ambulating well  HEAD:  Atraumatic, Normocephalic  EYES: EOMI, PERRLA, conjunctiva and minimal sclera icterus +  NECK: Supple, No JVD  CHEST/LUNG: Clear to auscultation bilaterally; No wheeze  HEART: Regular rate and rhythm; No murmurs, rubs, or gallops  ABDOMEN: Soft, Nontender, Nondistended; Bowel sounds present  EXTREMITIES:  2+ Peripheral Pulses, No clubbing, cyanosis, RLE swelling  PSYCH: AAOx3  NEUROLOGY: non-focal AOx3  SKIN: No rashes or lesions, jaundice    LABS:                        7.1    18.39 )-----------( 198      ( 08 May 2018 05:30 )             21.5     05-07    142  |  103  |  23  ----------------------------<  87  3.6   |  27  |  0.88    Ca    8.5      07 May 2018 06:00  Phos  3.1     05-07  Mg     2.1     05-07    TPro  6.1  /  Alb  3.4  /  TBili  3.8<H>  /  DBili  x   /  AST  35<H>  /  ALT  19  /  AlkPhos  68  05-07        CAPILLARY BLOOD GLUCOSE              RADIOLOGY & ADDITIONAL TESTS:  < from:  Duplex Venous Lower Ext Ltd, Right (05.07.18 @ 13:00) >  IMPRESSION:     No evidence of right lower extremity deep venous thrombosis.    < end of copied text >      MEDICATIONS:  carvedilol 6.25 milliGRAM(s) Oral every 12 hours  docusate sodium 100 milliGRAM(s) Oral two times a day  fenofibrate Tablet 145 milliGRAM(s) Oral daily  folic acid 1 milliGRAM(s) Oral daily  losartan 100 milliGRAM(s) Oral daily  pantoprazole    Tablet 40 milliGRAM(s) Oral before breakfast  polyethylene glycol 3350 17 Gram(s) Oral daily  predniSONE   Tablet 90 milliGRAM(s) Oral daily  senna 2 Tablet(s) Oral at bedtime  sodium chloride 0.9%. 1000 milliLiter(s) (50 mL/Hr) IV Continuous <Continuous>      ALLERGIES:  No Known Allergies

## 2018-05-08 NOTE — PROGRESS NOTE ADULT - PROBLEM SELECTOR PLAN 1
patient with warm AIHA  -agree with prednisone 90mg, can likely taper by 10 mg every 4 days after the hemoglobin stabilizes, would not start taper until hemoglobin shown to be stable  -patient has had less than a week of therapy, would not consider prednisone failure at this time, if patient did have prednisone failure second line therapies for steroid refractory AIHA include splenectomy or Rituximab  -while inpatient please check CBC, LDH, reticulocyte count daily  -will need repeat ultrasound in one year    -making referral to [facility], Entrance [address] [place] [address] [phone] (New patient unit), if patient is not called by Friday please ask patient to call above number    Plan discussed with primary team intern Dr. Resendiz, primary team resident Dr. Marroquin and primary team attending Dr. Kaba. Please call hematology fellow if any questions or concerns.    Dago Abreu  PGY-5, Hematology-Oncology Fellow  [phone] ([place]) [phone] ([facility])

## 2018-05-08 NOTE — PROGRESS NOTE ADULT - PROBLEM SELECTOR PLAN 1
- Patient was found to have a Hgb of 6.3 on presentation (Hgb had been 12.0 on 1/18 per PMD). She was found to be Paolo positive a/w elevated T. 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   - Hematology following, appreciate recs. Patient underwent a CT C/A/P yesterday and the report mentioned the possibility of lymphoma or leukemia. However, the imaging was reviewed by Hematology who suspect this is unlikely. Flow cytometry of peripheral blood also returned grossly WNL.  - C/w Prednisone 90mg QD  - Patient was given 1U of pRBCs on 5/2 with improvement; however, her Hgb has since downtrended. Since her Hgb is 6.8 yesterday, s/p 1U of pRBCs 5/5, repeat hgb 7.7  - Monitor CBC. Keep active T+S - Patient was found to have a Hgb of 6.3 on presentation (Hgb had been 12.0 on 1/18 per PMD). She was found to be Paolo positive a/w elevated T. 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   - Hematology following, appreciate recs. Patient underwent a CT C/A/P yesterday and the report mentioned the possibility of lymphoma or leukemia. However, the imaging was reviewed by Hematology who suspect this is unlikely. Flow cytometry of peripheral blood also returned grossly WNL.  - C/w Prednisone 90mg QD - will taper 10mg q4 days  - Patient was given 1U of pRBCs on 5/2 with improvement; however, her Hgb has since downtrended. Since her Hgb is 6.8 yesterday, s/p 1U of pRBCs 5/5, repeat hgb 7.1  - Monitor CBC. Keep active T+S  - Heme reccs appreciated - Patient was found to have a Hgb of 6.3 on presentation (Hgb had been 12.0 on 1/18 per PMD). She was found to be Paolo positive a/w elevated T. 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   - Hematology following, appreciate recs. Patient underwent a CT C/A/P yesterday and the report mentioned the possibility of lymphoma or leukemia. However, the imaging was reviewed by Hematology who suspect this is unlikely. Flow cytometry of peripheral blood also returned grossly WNL.  - C/w Prednisone 90mg QD - will taper 10mg q4 days  - Patient was given 1U of pRBCs on 5/2 with improvement; however, her Hgb has since downtrended. Since her Hgb is 6.8 yesterday, s/p 1U of pRBCs 5/5, repeat hgb 7.1  - Monitor CBC. Keep active T+S  - Heme reccs appreciated - trend labs

## 2018-05-09 VITALS
SYSTOLIC BLOOD PRESSURE: 180 MMHG | HEART RATE: 50 BPM | TEMPERATURE: 98 F | DIASTOLIC BLOOD PRESSURE: 60 MMHG | RESPIRATION RATE: 16 BRPM | OXYGEN SATURATION: 97 %

## 2018-05-09 LAB
ALBUMIN SERPL ELPH-MCNC: 3.2 G/DL — LOW (ref 3.3–5)
ALP SERPL-CCNC: 63 U/L — SIGNIFICANT CHANGE UP (ref 40–120)
ALT FLD-CCNC: 17 U/L — SIGNIFICANT CHANGE UP (ref 4–33)
AST SERPL-CCNC: 26 U/L — SIGNIFICANT CHANGE UP (ref 4–32)
BASOPHILS # BLD AUTO: 0.03 K/UL — SIGNIFICANT CHANGE UP (ref 0–0.2)
BASOPHILS NFR BLD AUTO: 0.2 % — SIGNIFICANT CHANGE UP (ref 0–2)
BILIRUB SERPL-MCNC: 3.4 MG/DL — HIGH (ref 0.2–1.2)
BLD GP AB SCN SERPL QL: NEGATIVE — SIGNIFICANT CHANGE UP
BUN SERPL-MCNC: 21 MG/DL — SIGNIFICANT CHANGE UP (ref 7–23)
CALCIUM SERPL-MCNC: 8.4 MG/DL — SIGNIFICANT CHANGE UP (ref 8.4–10.5)
CHLORIDE SERPL-SCNC: 104 MMOL/L — SIGNIFICANT CHANGE UP (ref 98–107)
CO2 SERPL-SCNC: 26 MMOL/L — SIGNIFICANT CHANGE UP (ref 22–31)
CREAT SERPL-MCNC: 0.88 MG/DL — SIGNIFICANT CHANGE UP (ref 0.5–1.3)
EOSINOPHIL # BLD AUTO: 0.22 K/UL — SIGNIFICANT CHANGE UP (ref 0–0.5)
EOSINOPHIL NFR BLD AUTO: 1.4 % — SIGNIFICANT CHANGE UP (ref 0–6)
GLUCOSE SERPL-MCNC: 97 MG/DL — SIGNIFICANT CHANGE UP (ref 70–99)
HAPTOGLOB SERPL-MCNC: < 20 MG/DL — LOW (ref 34–200)
HCT VFR BLD CALC: 22 % — LOW (ref 34.5–45)
HGB BLD-MCNC: 7 G/DL — CRITICAL LOW (ref 11.5–15.5)
IMM GRANULOCYTES # BLD AUTO: 0.78 # — SIGNIFICANT CHANGE UP
IMM GRANULOCYTES NFR BLD AUTO: 4.8 % — HIGH (ref 0–1.5)
LDH SERPL L TO P-CCNC: 669 U/L — HIGH (ref 135–225)
LYMPHOCYTES # BLD AUTO: 17.9 % — SIGNIFICANT CHANGE UP (ref 13–44)
LYMPHOCYTES # BLD AUTO: 2.91 K/UL — SIGNIFICANT CHANGE UP (ref 1–3.3)
MAGNESIUM SERPL-MCNC: 2.1 MG/DL — SIGNIFICANT CHANGE UP (ref 1.6–2.6)
MCHC RBC-ENTMCNC: 31.8 % — LOW (ref 32–36)
MCHC RBC-ENTMCNC: 34.1 PG — HIGH (ref 27–34)
MCV RBC AUTO: 107.3 FL — HIGH (ref 80–100)
MONOCYTES # BLD AUTO: 0.72 K/UL — SIGNIFICANT CHANGE UP (ref 0–0.9)
MONOCYTES NFR BLD AUTO: 4.4 % — SIGNIFICANT CHANGE UP (ref 2–14)
NEUTROPHILS # BLD AUTO: 11.61 K/UL — HIGH (ref 1.8–7.4)
NEUTROPHILS NFR BLD AUTO: 71.3 % — SIGNIFICANT CHANGE UP (ref 43–77)
NRBC # FLD: 1.83 — SIGNIFICANT CHANGE UP
NRBC FLD-RTO: 11.2 — SIGNIFICANT CHANGE UP
PHOSPHATE SERPL-MCNC: 3.3 MG/DL — SIGNIFICANT CHANGE UP (ref 2.5–4.5)
PLATELET # BLD AUTO: 199 K/UL — SIGNIFICANT CHANGE UP (ref 150–400)
PMV BLD: 9.9 FL — SIGNIFICANT CHANGE UP (ref 7–13)
POTASSIUM SERPL-MCNC: 3.4 MMOL/L — LOW (ref 3.5–5.3)
POTASSIUM SERPL-SCNC: 3.4 MMOL/L — LOW (ref 3.5–5.3)
PROT SERPL-MCNC: 5.7 G/DL — LOW (ref 6–8.3)
RBC # BLD: 2.05 M/UL — LOW (ref 3.8–5.2)
RBC # FLD: 30.5 % — HIGH (ref 10.3–14.5)
RH IG SCN BLD-IMP: POSITIVE — SIGNIFICANT CHANGE UP
SODIUM SERPL-SCNC: 141 MMOL/L — SIGNIFICANT CHANGE UP (ref 135–145)
URATE SERPL-MCNC: 6.2 MG/DL — SIGNIFICANT CHANGE UP (ref 2.5–7)
WBC # BLD: 16.27 K/UL — HIGH (ref 3.8–10.5)
WBC # FLD AUTO: 16.27 K/UL — HIGH (ref 3.8–10.5)

## 2018-05-09 PROCEDURE — 99239 HOSP IP/OBS DSCHRG MGMT >30: CPT

## 2018-05-09 RX ORDER — FOLIC ACID 0.8 MG
1 TABLET ORAL
Qty: 30 | Refills: 0
Start: 2018-05-09 | End: 2018-06-07

## 2018-05-09 RX ORDER — SENNA PLUS 8.6 MG/1
2 TABLET ORAL
Qty: 60 | Refills: 0
Start: 2018-05-09 | End: 2018-06-07

## 2018-05-09 RX ORDER — AMLODIPINE BESYLATE 2.5 MG/1
5 TABLET ORAL DAILY
Qty: 0 | Refills: 0 | Status: DISCONTINUED | OUTPATIENT
Start: 2018-05-09 | End: 2018-05-09

## 2018-05-09 RX ORDER — POLYETHYLENE GLYCOL 3350 17 G/17G
17 POWDER, FOR SOLUTION ORAL
Qty: 510 | Refills: 0
Start: 2018-05-09 | End: 2018-06-07

## 2018-05-09 RX ORDER — POTASSIUM CHLORIDE 20 MEQ
20 PACKET (EA) ORAL ONCE
Qty: 0 | Refills: 0 | Status: COMPLETED | OUTPATIENT
Start: 2018-05-09 | End: 2018-05-09

## 2018-05-09 RX ORDER — AMLODIPINE BESYLATE 2.5 MG/1
1 TABLET ORAL
Qty: 30 | Refills: 0
Start: 2018-05-09 | End: 2018-06-07

## 2018-05-09 RX ADMIN — Medication 1 MILLIGRAM(S): at 12:37

## 2018-05-09 RX ADMIN — Medication 90 MILLIGRAM(S): at 05:11

## 2018-05-09 RX ADMIN — Medication 20 MILLIEQUIVALENT(S): at 10:01

## 2018-05-09 RX ADMIN — Medication 145 MILLIGRAM(S): at 12:37

## 2018-05-09 RX ADMIN — PANTOPRAZOLE SODIUM 40 MILLIGRAM(S): 20 TABLET, DELAYED RELEASE ORAL at 05:10

## 2018-05-09 RX ADMIN — LOSARTAN POTASSIUM 100 MILLIGRAM(S): 100 TABLET, FILM COATED ORAL at 05:10

## 2018-05-09 RX ADMIN — AMLODIPINE BESYLATE 5 MILLIGRAM(S): 2.5 TABLET ORAL at 08:24

## 2018-05-09 NOTE — PROGRESS NOTE ADULT - PROBLEM SELECTOR PLAN 8
- DVT PPx: Holding pharmacologic AC in the setting of anemia. Will encourage ambulation

## 2018-05-09 NOTE — PROGRESS NOTE ADULT - PROVIDER SPECIALTY LIST ADULT
Heme/Onc
Heme/Onc
Internal Medicine

## 2018-05-09 NOTE — PROVIDER CONTACT NOTE (CRITICAL VALUE NOTIFICATION) - ACTION/TREATMENT ORDERED:
HGB; 6.8, HCT20.0, MD notified
MD notified, no new interventions at this time pending consultation from hematology for further recommendation.
continue to monitor.

## 2018-05-09 NOTE — PROGRESS NOTE ADULT - ATTENDING COMMENTS
66yo F w/ warm AIHA on prednisone 1mg/kg. Please check hemolysis labs daily.
Pt seen and examined. AIHA, warm Ab started on steroids. Responding well. No complaints. Labs trending in right direction. A/w above assessment and recs.
Hb 7.0 today, will continue prednisone and monitor CBC in Am. For CT of chest/abd/pelvice as per Hem rec. Will give gentle IVF hydration for renal prophylaxis.
Will check leg doppler to r/o DVT of RLE.  Will f/u hem rec.
Likely warm AIHA, responded to 1 unit of PRBC and Prednisone X1. will f/u Hem rec.
Will f/u Hem rec.
Will transfuse 1 unit of PRBC and discharge home with Hem f/u after transfusion.  Spoke to pt at length about f/u issues. she understood and agreed with the plan.  Total time: 35min
Will monitor CBC in Am
Hb 6.8, will give 1 unit of PRBC  Bowel regimen for constipation

## 2018-05-09 NOTE — PROGRESS NOTE ADULT - PROBLEM SELECTOR PROBLEM 1
Anemia
Autoimmune hemolytic anemia
Anemia
Autoimmune hemolytic anemia
Anemia

## 2018-05-09 NOTE — PROGRESS NOTE ADULT - PROBLEM SELECTOR PLAN 5
- Patient borderline hypertensive over the past 24 hours. Will c/w home Losartan and Coreg and hold home HCTZ and Hydralazine for now.  - Will monitor BP and resume home BP meds as appropriate - Patient borderline hypertensive over the past 24 hours. Will c/w home Losartan and Coreg and amlodipine 5mg and hold home HCTZ for now.  - Will monitor BP and resume home BP meds as appropriate

## 2018-05-09 NOTE — PROGRESS NOTE ADULT - PROBLEM SELECTOR PLAN 1
- Patient was found to have a Hgb of 6.3 on presentation (Hgb had been 12.0 on 1/18 per PMD). She was found to be Paolo positive a/w elevated T. 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   - Hematology following, appreciate recs. Patient underwent a CT C/A/P yesterday and the report mentioned the possibility of lymphoma or leukemia. However, the imaging was reviewed by Hematology who suspect this is unlikely. Flow cytometry of peripheral blood also returned grossly WNL.  - C/w Prednisone 90mg QD - will taper 10mg q4 days  - Patient was given 1U of pRBCs on 5/2 with improvement; however, her Hgb has since downtrended. Since her Hgb is 6.8 yesterday, s/p 1U of pRBCs 5/5, repeat hgb 7.1  - Monitor CBC. Keep active T+S  - Heme reccs appreciated - trend labs - Patient was found to have a Hgb of 6.3 on presentation (Hgb had been 12.0 on 1/18 per PMD). She was found to be Paolo positive a/w elevated T. 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   - Hematology following, appreciate recs. Patient underwent a CT C/A/P yesterday and the report mentioned the possibility of lymphoma or leukemia. However, the imaging was reviewed by Hematology who suspect this is unlikely. Flow cytometry of peripheral blood also returned grossly WNL.  - C/w Prednisone 90mg QD - will taper 10mg q4 days  - Patient was given 1U of pRBCs on 5/2 with improvement; however, her Hgb has since downtrended. Since her Hgb is 6.8 yesterday, s/p 1U of pRBCs 5/5, repeat hgb 7.0  - Monitor CBC. Keep active T+S  - Heme reccs appreciated - trend labs

## 2018-05-09 NOTE — PROGRESS NOTE ADULT - SUBJECTIVE AND OBJECTIVE BOX
CONTACT INFO:  Dale Resendiz MD  Internal Medicine PGY1  Pager:  [phone]/ [phone]    M-F 7am-7pm:  pager covered by day team     *Academic conferences M-F 8am-9am & 12pm-1pm - page ONLY if urgent or if consultant  M-F & Sa-Sun 7pm-7am:  NS  page 1443 for Teams 1-3, 1446 for Team 4 & Care Model/ [facility] Page [id number] for T 1-3 and [id number] for T4 &CM  Sa-Sun 7am-12pm:  see patient chart, primary physician assigned available 7am-12pm  Sa-Sun 12pm-7pm:  NS  page 1443 for Teams 1-4, [facility]  page Covering Resident    AUSTIN HAYNES  67y  Female    Patient is a 67y old  Female who presents with a chief complaint of Dizziness (04 May 2018 15:50)      INTERVAL HPI / OVERNIGHT EVENTS:      OBJECTIVE:  Telemetry (24 Hrs):     Vitals Signs (24 Hrs):  T(C): 36.5 (05-09-18 @ 05:00), Max: 36.9 (05-08-18 @ 21:49)  HR: 48 (05-09-18 @ 05:00) (48 - 53)  BP: 180/62 (05-09-18 @ 05:00) (148/56 - 180/62)  RR: 18 (05-09-18 @ 05:00) (18 - 18)  SpO2: 97% (05-09-18 @ 05:00) (95% - 98%)    PHYSICAL EXAM:  General: Comfortable, no apparent distress  HEENT: Atraumatic; EOMI, PERRLA, conjunctiva and sclera clear; no tonsillar erythema/exudates/enlargement  Neck: Supple; no JVD; thyroid normal without masses or enlargement  Chest/Lungs: Clear to auscultation B/L; no rales, rhonchi or wheezing  Heart: Regular rate and rhythm; normal S1/S2; no murmurs, rubs, or gallops  Abdomen: Soft, nontender, nondistended; bowel sounds present  Extremities: PT/DP pulses 2+ B/L; no edema  Skin: No rashes or lesions  Neurological: Alert and oriented to person/place/time    LABS:                        7.1    18.39 )-----------( 198      ( 08 May 2018 05:30 )             21.5               CAPILLARY BLOOD GLUCOSE              RADIOLOGY & ADDITIONAL TESTS:      MEDICATIONS:  amLODIPine   Tablet 5 milliGRAM(s) Oral daily  carvedilol 6.25 milliGRAM(s) Oral every 12 hours  docusate sodium 100 milliGRAM(s) Oral two times a day  fenofibrate Tablet 145 milliGRAM(s) Oral daily  folic acid 1 milliGRAM(s) Oral daily  losartan 100 milliGRAM(s) Oral daily  pantoprazole    Tablet 40 milliGRAM(s) Oral before breakfast  polyethylene glycol 3350 17 Gram(s) Oral daily  predniSONE   Tablet 90 milliGRAM(s) Oral daily  senna 2 Tablet(s) Oral at bedtime  sodium chloride 0.9%. 1000 milliLiter(s) (50 mL/Hr) IV Continuous <Continuous>      ALLERGIES:  No Known Allergies CONTACT INFO:  Dale Resendiz MD  Internal Medicine PGY1  Pager:  [phone]/ [phone]    M-F 7am-7pm:  pager covered by day team     *Academic conferences M-F 8am-9am & 12pm-1pm - page ONLY if urgent or if consultant  M-F & Sa-Sun 7pm-7am:  NS  page 1443 for Teams 1-3, 1446 for Team 4 & Care Model/ [facility] Page [phone] for T 1-3 and [id number] for T4 &CM  Sa-Sun 7am-12pm:  see patient chart, primary physician assigned available 7am-12pm  Sa-Sun 12pm-7pm:  NS  page 1443 for Teams 1-4, [facility]  page Covering Resident    AUSTIN HAYNES  67y  Female    Patient is a 67y old  Female who presents with a chief complaint of Dizziness (04 May 2018 15:50)      INTERVAL HPI / OVERNIGHT EVENTS:  NAEON, pt afebrile VSS, Pt felt well overnight. pt denies cp, sob, f/c, n/v.      OBJECTIVE:  Telemetry (24 Hrs):     Vitals Signs (24 Hrs):  T(C): 36.5 (05-09-18 @ 05:00), Max: 36.9 (05-08-18 @ 21:49)  HR: 48 (05-09-18 @ 05:00) (48 - 53)  BP: 180/62 (05-09-18 @ 05:00) (148/56 - 180/62)  RR: 18 (05-09-18 @ 05:00) (18 - 18)  SpO2: 97% (05-09-18 @ 05:00) (95% - 98%)    PHYSICAL EXAM:  GENERAL: NAD, well-developed, ambulating well  HEAD:  Atraumatic, Normocephalic  EYES: EOMI, PERRLA, conjunctiva and minimal sclera icterus +  NECK: Supple, No JVD  CHEST/LUNG: Clear to auscultation bilaterally; No wheeze  HEART: Regular rate and rhythm; No murmurs, rubs, or gallops  ABDOMEN: Soft, Nontender, Nondistended; Bowel sounds present  EXTREMITIES:  2+ Peripheral Pulses, No clubbing, cyanosis, RLE swelling  PSYCH: AAOx3  NEUROLOGY: non-focal AOx3  SKIN: No rashes or lesions, jaundice    LABS:                        7.1    18.39 )-----------( 198      ( 08 May 2018 05:30 )             21.5               CAPILLARY BLOOD GLUCOSE              RADIOLOGY & ADDITIONAL TESTS:      MEDICATIONS:  amLODIPine   Tablet 5 milliGRAM(s) Oral daily  carvedilol 6.25 milliGRAM(s) Oral every 12 hours  docusate sodium 100 milliGRAM(s) Oral two times a day  fenofibrate Tablet 145 milliGRAM(s) Oral daily  folic acid 1 milliGRAM(s) Oral daily  losartan 100 milliGRAM(s) Oral daily  pantoprazole    Tablet 40 milliGRAM(s) Oral before breakfast  polyethylene glycol 3350 17 Gram(s) Oral daily  predniSONE   Tablet 90 milliGRAM(s) Oral daily  senna 2 Tablet(s) Oral at bedtime  sodium chloride 0.9%. 1000 milliLiter(s) (50 mL/Hr) IV Continuous <Continuous>      ALLERGIES:  No Known Allergies

## 2018-05-09 NOTE — PROGRESS NOTE ADULT - PROBLEM SELECTOR PLAN 7
- Patient has full ROM of neck. Focal pain at nape of neck has since improved. No focal deficits noted. Suspect likely MSK related.  - Will continue to monitor

## 2018-05-09 NOTE — PROGRESS NOTE ADULT - ASSESSMENT
Patient is a 68 y/o Tanzanian-speaking F w/ a PMHx of HTN, CAD, and GERD who p/w 3 months of left sided ear fullness and tinnitus, found to have anemia likely 2/2 warm AIHA.
66 yo Yakut speaking female with PMHx of HTN, CAD, GERD c/o 3 months of left sided ear fullness and tinnitus, found to have anemia on blood work in ED
67F HTN, CAD p/w acute on chronic left sided ear pain associated with high pitched ringing. 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
Patient is a 66 y/o Somali-speaking F w/ a PMHx of HTN, CAD, and GERD who p/w 3 months of left sided ear fullness and tinnitus, found to have anemia likely 2/2 warm AIHA.
Patient is a 68 y/o Palauan-speaking F w/ a PMHx of HTN, CAD, and GERD who p/w 3 months of left sided ear fullness and tinnitus, found to have anemia likely 2/2 warm AIHA.
67F HTN, CAD p/w acute on chronic left sided ear pain associated with high pitched ringing. 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
Patient is a 66 y/o Cymro-speaking F w/ a PMHx of HTN, CAD, and GERD who p/w 3 months of left sided ear fullness and tinnitus, found to have anemia likely 2/2 warm AIHA.
68 yo Luxembourgish speaking female with PMHx of HTN, CAD, GERD c/o 3 months of left sided ear fullness and tinnitus, found to have anemia on blood work in ED

## 2018-05-09 NOTE — PROVIDER CONTACT NOTE (OTHER) - ASSESSMENT
Patient awake, alert and ambulating with no reports of dizziness, headache or lightheadedness. No signs of acute distress noted.  Area on L butt cheek shows a small dime size area that appears red but with no itching and very mild pain being reported.

## 2018-05-14 ENCOUNTER — APPOINTMENT (OUTPATIENT)
Dept: HEMATOLOGY ONCOLOGY | Facility: CLINIC | Age: 68
End: 2018-05-14

## 2018-05-14 ENCOUNTER — OUTPATIENT (OUTPATIENT)
Dept: OUTPATIENT SERVICES | Facility: HOSPITAL | Age: 68
LOS: 1 days | Discharge: HOME | End: 2018-05-14

## 2018-05-14 ENCOUNTER — RESULT REVIEW (OUTPATIENT)
Age: 68
End: 2018-05-14

## 2018-05-14 ENCOUNTER — RECORD ABSTRACTING (OUTPATIENT)
Age: 68
End: 2018-05-14

## 2018-05-14 ENCOUNTER — LABORATORY RESULT (OUTPATIENT)
Age: 68
End: 2018-05-14

## 2018-05-14 ENCOUNTER — OUTPATIENT (OUTPATIENT)
Dept: OUTPATIENT SERVICES | Facility: HOSPITAL | Age: 68
LOS: 1 days | End: 2018-05-14
Payer: MEDICARE

## 2018-05-14 DIAGNOSIS — Z98.890 OTHER SPECIFIED POSTPROCEDURAL STATES: Chronic | ICD-10-CM

## 2018-05-14 DIAGNOSIS — D64.9 ANEMIA, UNSPECIFIED: ICD-10-CM

## 2018-05-14 LAB
BASOPHILS # BLD AUTO: 0 K/UL — SIGNIFICANT CHANGE UP (ref 0–0.2)
BASOPHILS NFR BLD AUTO: 0.1 % — SIGNIFICANT CHANGE UP (ref 0–2)
BLD GP AB SCN SERPL QL: NEGATIVE — SIGNIFICANT CHANGE UP
EOSINOPHIL # BLD AUTO: 0.1 K/UL — SIGNIFICANT CHANGE UP (ref 0–0.5)
EOSINOPHIL NFR BLD AUTO: 0.4 % — SIGNIFICANT CHANGE UP (ref 0–6)
HCT VFR BLD CALC: 27 % — LOW (ref 34.5–45)
HGB BLD-MCNC: 9.4 G/DL — LOW (ref 11.5–15.5)
LYMPHOCYTES # BLD AUTO: 1 K/UL — SIGNIFICANT CHANGE UP (ref 1–3.3)
LYMPHOCYTES # BLD AUTO: 7.4 % — LOW (ref 13–44)
MCHC RBC-ENTMCNC: 34.9 G/DL — SIGNIFICANT CHANGE UP (ref 32–36)
MCHC RBC-ENTMCNC: 35.6 PG — HIGH (ref 27–34)
MCV RBC AUTO: 102 FL — HIGH (ref 80–100)
MONOCYTES # BLD AUTO: 0.2 K/UL — SIGNIFICANT CHANGE UP (ref 0–0.9)
MONOCYTES NFR BLD AUTO: 1.4 % — LOW (ref 2–14)
NEUTROPHILS # BLD AUTO: 12.6 K/UL — HIGH (ref 1.8–7.4)
NEUTROPHILS NFR BLD AUTO: 90.6 % — HIGH (ref 43–77)
PLATELET # BLD AUTO: 301 K/UL — SIGNIFICANT CHANGE UP (ref 150–400)
RBC # BLD: 2.65 M/UL — LOW (ref 3.8–5.2)
RBC # FLD: 22.4 % — HIGH (ref 10.3–14.5)
RETICS #: 600 K/UL — HIGH (ref 25–125)
RETICS/RBC NFR: 22.6 % — HIGH (ref 0.5–2.5)
RH IG SCN BLD-IMP: POSITIVE — SIGNIFICANT CHANGE UP
WBC # BLD: 13.9 K/UL — HIGH (ref 3.8–10.5)
WBC # FLD AUTO: 13.9 K/UL — HIGH (ref 3.8–10.5)

## 2018-05-14 PROCEDURE — 86850 RBC ANTIBODY SCREEN: CPT

## 2018-05-14 PROCEDURE — 86900 BLOOD TYPING SEROLOGIC ABO: CPT

## 2018-05-14 PROCEDURE — 86901 BLOOD TYPING SEROLOGIC RH(D): CPT

## 2018-05-14 PROCEDURE — 86922 COMPATIBILITY TEST ANTIGLOB: CPT

## 2018-05-15 ENCOUNTER — APPOINTMENT (OUTPATIENT)
Dept: HEMATOLOGY ONCOLOGY | Facility: CLINIC | Age: 68
End: 2018-05-15
Payer: MEDICARE

## 2018-05-15 VITALS
HEIGHT: 62.2 IN | SYSTOLIC BLOOD PRESSURE: 134 MMHG | RESPIRATION RATE: 16 BRPM | OXYGEN SATURATION: 97 % | HEART RATE: 46 BPM | TEMPERATURE: 98 F | BODY MASS INDEX: 34.45 KG/M2 | WEIGHT: 189.59 LBS | DIASTOLIC BLOOD PRESSURE: 73 MMHG

## 2018-05-15 PROCEDURE — 99215 OFFICE O/P EST HI 40 MIN: CPT

## 2018-05-21 ENCOUNTER — RESULT REVIEW (OUTPATIENT)
Age: 68
End: 2018-05-21

## 2018-05-21 ENCOUNTER — APPOINTMENT (OUTPATIENT)
Dept: HEMATOLOGY ONCOLOGY | Facility: CLINIC | Age: 68
End: 2018-05-21

## 2018-05-21 LAB
BASOPHILS # BLD AUTO: 0 K/UL — SIGNIFICANT CHANGE UP (ref 0–0.2)
BASOPHILS NFR BLD AUTO: 0 % — SIGNIFICANT CHANGE UP (ref 0–2)
EOSINOPHIL # BLD AUTO: 0 K/UL — SIGNIFICANT CHANGE UP (ref 0–0.5)
EOSINOPHIL NFR BLD AUTO: 0.1 % — SIGNIFICANT CHANGE UP (ref 0–6)
HAPTOGLOB SERPL-MCNC: 21 MG/DL
HCT VFR BLD CALC: 28.2 % — LOW (ref 34.5–45)
HGB BLD-MCNC: 10.3 G/DL — LOW (ref 11.5–15.5)
LDH SERPL-CCNC: 298 U/L
LYMPHOCYTES # BLD AUTO: 0.5 K/UL — LOW (ref 1–3.3)
LYMPHOCYTES # BLD AUTO: 4.6 % — LOW (ref 13–44)
MCHC RBC-ENTMCNC: 36.7 G/DL — HIGH (ref 32–36)
MCHC RBC-ENTMCNC: 37.6 PG — HIGH (ref 27–34)
MCV RBC AUTO: 102 FL — HIGH (ref 80–100)
MONOCYTES # BLD AUTO: 0.2 K/UL — SIGNIFICANT CHANGE UP (ref 0–0.9)
MONOCYTES NFR BLD AUTO: 1.4 % — LOW (ref 2–14)
NEUTROPHILS # BLD AUTO: 11 K/UL — HIGH (ref 1.8–7.4)
NEUTROPHILS NFR BLD AUTO: 94 % — HIGH (ref 43–77)
PLATELET # BLD AUTO: 288 K/UL — SIGNIFICANT CHANGE UP (ref 150–400)
RBC # BLD: 2.75 M/UL — LOW (ref 3.8–5.2)
RBC # FLD: 18.8 % — HIGH (ref 10.3–14.5)
RETICS #: 512 K/UL — HIGH (ref 25–125)
RETICS/RBC NFR: 18.6 % — HIGH (ref 0.5–2.5)
WBC # BLD: 11.7 K/UL — HIGH (ref 3.8–10.5)
WBC # FLD AUTO: 11.7 K/UL — HIGH (ref 3.8–10.5)

## 2018-05-29 ENCOUNTER — RESULT REVIEW (OUTPATIENT)
Age: 68
End: 2018-05-29

## 2018-05-29 ENCOUNTER — APPOINTMENT (OUTPATIENT)
Dept: HEMATOLOGY ONCOLOGY | Facility: CLINIC | Age: 68
End: 2018-05-29
Payer: MEDICARE

## 2018-05-29 VITALS
SYSTOLIC BLOOD PRESSURE: 144 MMHG | WEIGHT: 196.87 LBS | TEMPERATURE: 98.1 F | OXYGEN SATURATION: 100 % | RESPIRATION RATE: 16 BRPM | BODY MASS INDEX: 35.78 KG/M2 | HEART RATE: 51 BPM | DIASTOLIC BLOOD PRESSURE: 75 MMHG

## 2018-05-29 LAB
ALBUMIN SERPL ELPH-MCNC: 3.7 G/DL
ALP BLD-CCNC: 55 U/L
ALT SERPL-CCNC: 15 U/L
ANION GAP SERPL CALC-SCNC: 12 MMOL/L
AST SERPL-CCNC: 19 U/L
BASOPHILS # BLD AUTO: 0 K/UL — SIGNIFICANT CHANGE UP (ref 0–0.2)
BASOPHILS NFR BLD AUTO: 0.2 % — SIGNIFICANT CHANGE UP (ref 0–2)
BILIRUB SERPL-MCNC: 1 MG/DL
BUN SERPL-MCNC: 19 MG/DL
CALCIUM SERPL-MCNC: 8.8 MG/DL
CHLORIDE SERPL-SCNC: 105 MMOL/L
CO2 SERPL-SCNC: 27 MMOL/L
CREAT SERPL-MCNC: 0.84 MG/DL
EOSINOPHIL # BLD AUTO: 0 K/UL — SIGNIFICANT CHANGE UP (ref 0–0.5)
EOSINOPHIL NFR BLD AUTO: 0.6 % — SIGNIFICANT CHANGE UP (ref 0–6)
GLUCOSE SERPL-MCNC: 146 MG/DL
HAPTOGLOB SERPL-MCNC: <20 MG/DL
HCT VFR BLD CALC: 33.5 % — LOW (ref 34.5–45)
HGB BLD-MCNC: 12.1 G/DL — SIGNIFICANT CHANGE UP (ref 11.5–15.5)
LDH SERPL-CCNC: 309 U/L
LYMPHOCYTES # BLD AUTO: 0.5 K/UL — LOW (ref 1–3.3)
LYMPHOCYTES # BLD AUTO: 5.4 % — LOW (ref 13–44)
MCHC RBC-ENTMCNC: 36 G/DL — SIGNIFICANT CHANGE UP (ref 32–36)
MCHC RBC-ENTMCNC: 36.4 PG — HIGH (ref 27–34)
MCV RBC AUTO: 101 FL — HIGH (ref 80–100)
MONOCYTES # BLD AUTO: 0.1 K/UL — SIGNIFICANT CHANGE UP (ref 0–0.9)
MONOCYTES NFR BLD AUTO: 0.9 % — LOW (ref 2–14)
NEUTROPHILS # BLD AUTO: 8.2 K/UL — HIGH (ref 1.8–7.4)
NEUTROPHILS NFR BLD AUTO: 93 % — HIGH (ref 43–77)
PLATELET # BLD AUTO: 248 K/UL — SIGNIFICANT CHANGE UP (ref 150–400)
POTASSIUM SERPL-SCNC: 4.2 MMOL/L
PROT SERPL-MCNC: 5.9 G/DL
RBC # BLD: 3.31 M/UL — LOW (ref 3.8–5.2)
RBC # FLD: 15.7 % — HIGH (ref 10.3–14.5)
RETICS #: SIGNIFICANT CHANGE UP K/UL (ref 25–125)
RETICS/RBC NFR: SIGNIFICANT CHANGE UP % (ref 0.5–2.5)
SODIUM SERPL-SCNC: 144 MMOL/L
WBC # BLD: 8.8 K/UL — SIGNIFICANT CHANGE UP (ref 3.8–10.5)
WBC # FLD AUTO: 8.8 K/UL — SIGNIFICANT CHANGE UP (ref 3.8–10.5)

## 2018-05-29 PROCEDURE — 99214 OFFICE O/P EST MOD 30 MIN: CPT

## 2018-05-30 ENCOUNTER — EMERGENCY (EMERGENCY)
Facility: HOSPITAL | Age: 68
LOS: 1 days | Discharge: HOME | End: 2018-05-30
Attending: EMERGENCY MEDICINE | Admitting: EMERGENCY MEDICINE
Payer: COMMERCIAL

## 2018-05-30 VITALS
DIASTOLIC BLOOD PRESSURE: 60 MMHG | SYSTOLIC BLOOD PRESSURE: 164 MMHG | OXYGEN SATURATION: 99 % | RESPIRATION RATE: 18 BRPM | TEMPERATURE: 98 F | HEART RATE: 60 BPM

## 2018-05-30 VITALS
TEMPERATURE: 98 F | OXYGEN SATURATION: 98 % | DIASTOLIC BLOOD PRESSURE: 76 MMHG | SYSTOLIC BLOOD PRESSURE: 158 MMHG | RESPIRATION RATE: 19 BRPM | HEART RATE: 56 BPM

## 2018-05-30 DIAGNOSIS — Z98.890 OTHER SPECIFIED POSTPROCEDURAL STATES: Chronic | ICD-10-CM

## 2018-05-30 LAB
ALBUMIN SERPL ELPH-MCNC: 3.8 G/DL — SIGNIFICANT CHANGE UP (ref 3.3–5)
ALP SERPL-CCNC: 62 U/L — SIGNIFICANT CHANGE UP (ref 40–120)
ALT FLD-CCNC: 19 U/L — SIGNIFICANT CHANGE UP (ref 10–45)
ANION GAP SERPL CALC-SCNC: 13 MMOL/L — SIGNIFICANT CHANGE UP (ref 5–17)
AST SERPL-CCNC: 25 U/L — SIGNIFICANT CHANGE UP (ref 10–40)
BASOPHILS # BLD AUTO: 0 K/UL — SIGNIFICANT CHANGE UP (ref 0–0.2)
BASOPHILS NFR BLD AUTO: 0.3 % — SIGNIFICANT CHANGE UP (ref 0–2)
BILIRUB SERPL-MCNC: 0.8 MG/DL — SIGNIFICANT CHANGE UP (ref 0.2–1.2)
BUN SERPL-MCNC: 19 MG/DL — SIGNIFICANT CHANGE UP (ref 7–23)
CALCIUM SERPL-MCNC: 9.1 MG/DL — SIGNIFICANT CHANGE UP (ref 8.4–10.5)
CHLORIDE SERPL-SCNC: 102 MMOL/L — SIGNIFICANT CHANGE UP (ref 96–108)
CO2 SERPL-SCNC: 26 MMOL/L — SIGNIFICANT CHANGE UP (ref 22–31)
CREAT SERPL-MCNC: 0.76 MG/DL — SIGNIFICANT CHANGE UP (ref 0.5–1.3)
EOSINOPHIL # BLD AUTO: 0 K/UL — SIGNIFICANT CHANGE UP (ref 0–0.5)
EOSINOPHIL NFR BLD AUTO: 0.4 % — SIGNIFICANT CHANGE UP (ref 0–6)
GLUCOSE SERPL-MCNC: 126 MG/DL — HIGH (ref 70–99)
HCT VFR BLD CALC: 35 % — SIGNIFICANT CHANGE UP (ref 34.5–45)
HGB BLD-MCNC: 12.3 G/DL — SIGNIFICANT CHANGE UP (ref 11.5–15.5)
LYMPHOCYTES # BLD AUTO: 0.6 K/UL — LOW (ref 1–3.3)
LYMPHOCYTES # BLD AUTO: 6.8 % — LOW (ref 13–44)
MCHC RBC-ENTMCNC: 35.1 GM/DL — SIGNIFICANT CHANGE UP (ref 32–36)
MCHC RBC-ENTMCNC: 36.3 PG — HIGH (ref 27–34)
MCV RBC AUTO: 104 FL — HIGH (ref 80–100)
MONOCYTES # BLD AUTO: 0.2 K/UL — SIGNIFICANT CHANGE UP (ref 0–0.9)
MONOCYTES NFR BLD AUTO: 2.4 % — SIGNIFICANT CHANGE UP (ref 2–14)
NEUTROPHILS # BLD AUTO: 7.6 K/UL — HIGH (ref 1.8–7.4)
NEUTROPHILS NFR BLD AUTO: 90.1 % — HIGH (ref 43–77)
PLATELET # BLD AUTO: 267 K/UL — SIGNIFICANT CHANGE UP (ref 150–400)
POTASSIUM SERPL-MCNC: 4.2 MMOL/L — SIGNIFICANT CHANGE UP (ref 3.5–5.3)
POTASSIUM SERPL-SCNC: 4.2 MMOL/L — SIGNIFICANT CHANGE UP (ref 3.5–5.3)
PROT SERPL-MCNC: 6.2 G/DL — SIGNIFICANT CHANGE UP (ref 6–8.3)
RBC # BLD: 3.38 M/UL — LOW (ref 3.8–5.2)
RBC # FLD: 15.4 % — HIGH (ref 10.3–14.5)
SODIUM SERPL-SCNC: 141 MMOL/L — SIGNIFICANT CHANGE UP (ref 135–145)
WBC # BLD: 8.5 K/UL — SIGNIFICANT CHANGE UP (ref 3.8–10.5)
WBC # FLD AUTO: 8.5 K/UL — SIGNIFICANT CHANGE UP (ref 3.8–10.5)

## 2018-05-30 PROCEDURE — 99283 EMERGENCY DEPT VISIT LOW MDM: CPT

## 2018-05-30 PROCEDURE — 85027 COMPLETE CBC AUTOMATED: CPT

## 2018-05-30 PROCEDURE — 99284 EMERGENCY DEPT VISIT MOD MDM: CPT

## 2018-05-30 PROCEDURE — 80053 COMPREHEN METABOLIC PANEL: CPT

## 2018-05-30 RX ORDER — VALACYCLOVIR 500 MG/1
1 TABLET, FILM COATED ORAL
Qty: 21 | Refills: 0
Start: 2018-05-30 | End: 2018-06-05

## 2018-05-30 NOTE — ED PROVIDER NOTE - ATTENDING CONTRIBUTION TO CARE
66y/o F h/o anemia, HTN HLD GERD recetn admission for hemolytic anemia (likely 2/2 HCTZ, now d/c'd), presenting with painful rash on right side of back and chest.      On PE, rash has erythematous base with several vesicles, in right ~t3 distribution, likely zoster.  Will check labs: cbc, cmp (given recent hemolytic anemia) and if no significant anemia or renal dysfunction, can be discharged on antivirals, to f/u with pcp.

## 2018-05-30 NOTE — ED ADULT NURSE NOTE - OBJECTIVE STATEMENT
Pt with rash right upper back. No distress. Breathing easy and non labored. Pt ambulatory. Afebrile. Anxious. Emotional support offered.

## 2018-05-30 NOTE — ED PROVIDER NOTE - OBJECTIVE STATEMENT
Emperatriz Villa   66 yo F hx anemia, HTN, HLD, GERD, constipation, returning from [place] 2 weeks ago reports rash on chest x 3 days with associated pain. No CP, sob, abdominal pain, dysuria, hematuria, constipation, diarrhea.     Katharina Quan

## 2018-06-05 ENCOUNTER — APPOINTMENT (OUTPATIENT)
Dept: HEMATOLOGY ONCOLOGY | Facility: CLINIC | Age: 68
End: 2018-06-05
Payer: MEDICARE

## 2018-06-05 ENCOUNTER — RESULT REVIEW (OUTPATIENT)
Age: 68
End: 2018-06-05

## 2018-06-05 VITALS
HEART RATE: 52 BPM | WEIGHT: 196.21 LBS | SYSTOLIC BLOOD PRESSURE: 150 MMHG | TEMPERATURE: 97.7 F | RESPIRATION RATE: 16 BRPM | BODY MASS INDEX: 35.66 KG/M2 | DIASTOLIC BLOOD PRESSURE: 70 MMHG | OXYGEN SATURATION: 97 %

## 2018-06-05 LAB
BASOPHILS # BLD AUTO: 0 K/UL — SIGNIFICANT CHANGE UP (ref 0–0.2)
BASOPHILS NFR BLD AUTO: 0.2 % — SIGNIFICANT CHANGE UP (ref 0–2)
EOSINOPHIL # BLD AUTO: 0.2 K/UL — SIGNIFICANT CHANGE UP (ref 0–0.5)
EOSINOPHIL NFR BLD AUTO: 1.3 % — SIGNIFICANT CHANGE UP (ref 0–6)
HCT VFR BLD CALC: 34.8 % — SIGNIFICANT CHANGE UP (ref 34.5–45)
HGB BLD-MCNC: 12.4 G/DL — SIGNIFICANT CHANGE UP (ref 11.5–15.5)
LYMPHOCYTES # BLD AUTO: 0.8 K/UL — LOW (ref 1–3.3)
LYMPHOCYTES # BLD AUTO: 7.1 % — LOW (ref 13–44)
MCHC RBC-ENTMCNC: 35.5 PG — HIGH (ref 27–34)
MCHC RBC-ENTMCNC: 35.7 G/DL — SIGNIFICANT CHANGE UP (ref 32–36)
MCV RBC AUTO: 99.4 FL — SIGNIFICANT CHANGE UP (ref 80–100)
MONOCYTES # BLD AUTO: 0.4 K/UL — SIGNIFICANT CHANGE UP (ref 0–0.9)
MONOCYTES NFR BLD AUTO: 3.4 % — SIGNIFICANT CHANGE UP (ref 2–14)
NEUTROPHILS # BLD AUTO: 10.5 K/UL — HIGH (ref 1.8–7.4)
NEUTROPHILS NFR BLD AUTO: 88 % — HIGH (ref 43–77)
PLATELET # BLD AUTO: 246 K/UL — SIGNIFICANT CHANGE UP (ref 150–400)
RBC # BLD: 3.5 M/UL — LOW (ref 3.8–5.2)
RBC # FLD: 14.9 % — HIGH (ref 10.3–14.5)
RETICS #: SIGNIFICANT CHANGE UP K/UL (ref 25–125)
RETICS/RBC NFR: SIGNIFICANT CHANGE UP % (ref 0.5–2.5)
WBC # BLD: 11.9 K/UL — HIGH (ref 3.8–10.5)
WBC # FLD AUTO: 11.9 K/UL — HIGH (ref 3.8–10.5)

## 2018-06-05 PROCEDURE — 99214 OFFICE O/P EST MOD 30 MIN: CPT

## 2018-06-11 ENCOUNTER — MEDICATION RENEWAL (OUTPATIENT)
Age: 68
End: 2018-06-11

## 2018-06-11 RX ORDER — FOLIC ACID 1 MG/1
1 TABLET ORAL DAILY
Qty: 90 | Refills: 0 | Status: ACTIVE | COMMUNITY
Start: 2018-06-11 | End: 1900-01-01

## 2018-06-19 LAB
ALBUMIN SERPL ELPH-MCNC: 3.7 G/DL
ALP BLD-CCNC: 50 U/L
ALT SERPL-CCNC: 20 U/L
ANION GAP SERPL CALC-SCNC: 16 MMOL/L
AST SERPL-CCNC: 14 U/L
BILIRUB SERPL-MCNC: 0.8 MG/DL
BUN SERPL-MCNC: 17 MG/DL
CALCIUM SERPL-MCNC: 9.1 MG/DL
CHLORIDE SERPL-SCNC: 101 MMOL/L
CO2 SERPL-SCNC: 25 MMOL/L
CREAT SERPL-MCNC: 0.78 MG/DL
GLUCOSE SERPL-MCNC: 121 MG/DL
HAPTOGLOB SERPL-MCNC: 80 MG/DL
LDH SERPL-CCNC: 291 U/L
POTASSIUM SERPL-SCNC: 3.7 MMOL/L
PROT SERPL-MCNC: 6 G/DL
SODIUM SERPL-SCNC: 142 MMOL/L

## 2018-06-26 ENCOUNTER — OUTPATIENT (OUTPATIENT)
Dept: OUTPATIENT SERVICES | Facility: HOSPITAL | Age: 68
LOS: 1 days | Discharge: HOME | End: 2018-06-26

## 2018-06-26 DIAGNOSIS — D64.9 ANEMIA, UNSPECIFIED: ICD-10-CM

## 2018-06-26 DIAGNOSIS — Z98.890 OTHER SPECIFIED POSTPROCEDURAL STATES: Chronic | ICD-10-CM

## 2018-06-29 ENCOUNTER — RESULT REVIEW (OUTPATIENT)
Age: 68
End: 2018-06-29

## 2018-06-29 ENCOUNTER — APPOINTMENT (OUTPATIENT)
Dept: HEMATOLOGY ONCOLOGY | Facility: CLINIC | Age: 68
End: 2018-06-29
Payer: MEDICARE

## 2018-06-29 VITALS
BODY MASS INDEX: 36.46 KG/M2 | TEMPERATURE: 98 F | HEART RATE: 62 BPM | RESPIRATION RATE: 16 BRPM | DIASTOLIC BLOOD PRESSURE: 92 MMHG | SYSTOLIC BLOOD PRESSURE: 194 MMHG | WEIGHT: 200.62 LBS | OXYGEN SATURATION: 98 %

## 2018-06-29 DIAGNOSIS — B02.29 OTHER POSTHERPETIC NERVOUS SYSTEM INVOLVEMENT: ICD-10-CM

## 2018-06-29 LAB
BASOPHILS # BLD AUTO: 0 K/UL — SIGNIFICANT CHANGE UP (ref 0–0.2)
BASOPHILS NFR BLD AUTO: 0 % — SIGNIFICANT CHANGE UP (ref 0–2)
EOSINOPHIL # BLD AUTO: 0.1 K/UL — SIGNIFICANT CHANGE UP (ref 0–0.5)
EOSINOPHIL NFR BLD AUTO: 0.6 % — SIGNIFICANT CHANGE UP (ref 0–6)
HCT VFR BLD CALC: 36.8 % — SIGNIFICANT CHANGE UP (ref 34.5–45)
HGB BLD-MCNC: 12.6 G/DL — SIGNIFICANT CHANGE UP (ref 11.5–15.5)
LYMPHOCYTES # BLD AUTO: 0.9 K/UL — LOW (ref 1–3.3)
LYMPHOCYTES # BLD AUTO: 9.4 % — LOW (ref 13–44)
MCHC RBC-ENTMCNC: 32.3 PG — SIGNIFICANT CHANGE UP (ref 27–34)
MCHC RBC-ENTMCNC: 34.3 G/DL — SIGNIFICANT CHANGE UP (ref 32–36)
MCV RBC AUTO: 94.4 FL — SIGNIFICANT CHANGE UP (ref 80–100)
MONOCYTES # BLD AUTO: 0.2 K/UL — SIGNIFICANT CHANGE UP (ref 0–0.9)
MONOCYTES NFR BLD AUTO: 1.8 % — LOW (ref 2–14)
NEUTROPHILS # BLD AUTO: 8.5 K/UL — HIGH (ref 1.8–7.4)
NEUTROPHILS NFR BLD AUTO: 88.2 % — HIGH (ref 43–77)
PLATELET # BLD AUTO: 260 K/UL — SIGNIFICANT CHANGE UP (ref 150–400)
RBC # BLD: 3.9 M/UL — SIGNIFICANT CHANGE UP (ref 3.8–5.2)
RBC # FLD: 13.2 % — SIGNIFICANT CHANGE UP (ref 10.3–14.5)
RETICS #: 121 K/UL — SIGNIFICANT CHANGE UP (ref 25–125)
RETICS/RBC NFR: 3.1 % — HIGH (ref 0.5–2.5)
WBC # BLD: 9.6 K/UL — SIGNIFICANT CHANGE UP (ref 3.8–10.5)
WBC # FLD AUTO: 9.6 K/UL — SIGNIFICANT CHANGE UP (ref 3.8–10.5)

## 2018-06-29 PROCEDURE — 99214 OFFICE O/P EST MOD 30 MIN: CPT

## 2018-06-29 RX ORDER — GABAPENTIN 300 MG/1
300 CAPSULE ORAL TWICE DAILY
Qty: 60 | Refills: 2 | Status: ACTIVE | COMMUNITY
Start: 2018-06-29 | End: 1900-01-01

## 2018-07-02 LAB
ALBUMIN SERPL ELPH-MCNC: 3.5 G/DL
ALP BLD-CCNC: 57 U/L
ALT SERPL-CCNC: 17 U/L
ANION GAP SERPL CALC-SCNC: 14 MMOL/L
AST SERPL-CCNC: 12 U/L
BILIRUB SERPL-MCNC: 0.5 MG/DL
BUN SERPL-MCNC: 14 MG/DL
CALCIUM SERPL-MCNC: 8.8 MG/DL
CHLORIDE SERPL-SCNC: 106 MMOL/L
CO2 SERPL-SCNC: 24 MMOL/L
CREAT SERPL-MCNC: 0.73 MG/DL
GLUCOSE SERPL-MCNC: 185 MG/DL
HAPTOGLOB SERPL-MCNC: 156 MG/DL
LDH SERPL-CCNC: 270 U/L
POTASSIUM SERPL-SCNC: 4.2 MMOL/L
PROT SERPL-MCNC: 5.9 G/DL
SODIUM SERPL-SCNC: 144 MMOL/L

## 2018-07-20 ENCOUNTER — RESULT REVIEW (OUTPATIENT)
Age: 68
End: 2018-07-20

## 2018-07-20 ENCOUNTER — APPOINTMENT (OUTPATIENT)
Dept: HEMATOLOGY ONCOLOGY | Facility: CLINIC | Age: 68
End: 2018-07-20
Payer: MEDICARE

## 2018-07-20 VITALS
SYSTOLIC BLOOD PRESSURE: 148 MMHG | WEIGHT: 202.83 LBS | RESPIRATION RATE: 16 BRPM | OXYGEN SATURATION: 97 % | DIASTOLIC BLOOD PRESSURE: 76 MMHG | TEMPERATURE: 98.4 F | HEART RATE: 54 BPM | BODY MASS INDEX: 36.86 KG/M2

## 2018-07-20 PROBLEM — E78.2 MIXED HYPERLIPIDEMIA: Chronic | Status: ACTIVE | Noted: 2018-05-02

## 2018-07-20 LAB
BASOPHILS # BLD AUTO: 0 K/UL — SIGNIFICANT CHANGE UP (ref 0–0.2)
BASOPHILS NFR BLD AUTO: 0.3 % — SIGNIFICANT CHANGE UP (ref 0–2)
EOSINOPHIL # BLD AUTO: 0.2 K/UL — SIGNIFICANT CHANGE UP (ref 0–0.5)
EOSINOPHIL NFR BLD AUTO: 2.2 % — SIGNIFICANT CHANGE UP (ref 0–6)
HCT VFR BLD CALC: 36.8 % — SIGNIFICANT CHANGE UP (ref 34.5–45)
HGB BLD-MCNC: 13 G/DL — SIGNIFICANT CHANGE UP (ref 11.5–15.5)
LYMPHOCYTES # BLD AUTO: 1.4 K/UL — SIGNIFICANT CHANGE UP (ref 1–3.3)
LYMPHOCYTES # BLD AUTO: 13.7 % — SIGNIFICANT CHANGE UP (ref 13–44)
MCHC RBC-ENTMCNC: 32.2 PG — SIGNIFICANT CHANGE UP (ref 27–34)
MCHC RBC-ENTMCNC: 35.4 G/DL — SIGNIFICANT CHANGE UP (ref 32–36)
MCV RBC AUTO: 90.9 FL — SIGNIFICANT CHANGE UP (ref 80–100)
MONOCYTES # BLD AUTO: 0.3 K/UL — SIGNIFICANT CHANGE UP (ref 0–0.9)
MONOCYTES NFR BLD AUTO: 2.9 % — SIGNIFICANT CHANGE UP (ref 2–14)
NEUTROPHILS # BLD AUTO: 8.2 K/UL — HIGH (ref 1.8–7.4)
NEUTROPHILS NFR BLD AUTO: 80.9 % — HIGH (ref 43–77)
PLATELET # BLD AUTO: 268 K/UL — SIGNIFICANT CHANGE UP (ref 150–400)
RBC # BLD: 4.05 M/UL — SIGNIFICANT CHANGE UP (ref 3.8–5.2)
RBC # FLD: 12.7 % — SIGNIFICANT CHANGE UP (ref 10.3–14.5)
WBC # BLD: 10.2 K/UL — SIGNIFICANT CHANGE UP (ref 3.8–10.5)
WBC # FLD AUTO: 10.2 K/UL — SIGNIFICANT CHANGE UP (ref 3.8–10.5)

## 2018-07-20 PROCEDURE — 99214 OFFICE O/P EST MOD 30 MIN: CPT

## 2018-07-25 LAB
ALBUMIN SERPL ELPH-MCNC: 3.7 G/DL
ALP BLD-CCNC: 72 U/L
ALT SERPL-CCNC: 15 U/L
ANION GAP SERPL CALC-SCNC: 10 MMOL/L
AST SERPL-CCNC: 14 U/L
BILIRUB SERPL-MCNC: 0.4 MG/DL
BUN SERPL-MCNC: 10 MG/DL
CALCIUM SERPL-MCNC: 9 MG/DL
CHLORIDE SERPL-SCNC: 104 MMOL/L
CO2 SERPL-SCNC: 29 MMOL/L
CREAT SERPL-MCNC: 0.65 MG/DL
GLUCOSE SERPL-MCNC: 163 MG/DL
HAPTOGLOB SERPL-MCNC: 164 MG/DL
LDH SERPL-CCNC: 219 U/L
POTASSIUM SERPL-SCNC: 4.6 MMOL/L
PROT SERPL-MCNC: 6.1 G/DL
SODIUM SERPL-SCNC: 143 MMOL/L

## 2018-07-29 NOTE — PROVIDER CONTACT NOTE (OTHER) - DATE AND TIME:
06-May-2018 15:19 Pt AOx3 breathing unlabored no c/o resp. distress chest pain or SOB. Pt S/P Lap Converted to Open Keyanna Abdomen noted with Mepilex and Avellyn dressing CDI Abdomen soft tender to touch obese in size BS present in all 4 quadrants Pt tolerating diet denies any N/V. Cap refill less than 3 seconds pulses present in all extremities Pt with positive sensation and mobility OOB ambulating voiding without any difficulties Pt for DC home verbalizes understanding and agreement with DC. Pt given dressing supplies for home use All needs of pt met thus far.

## 2018-08-14 ENCOUNTER — OUTPATIENT (OUTPATIENT)
Dept: OUTPATIENT SERVICES | Facility: HOSPITAL | Age: 68
LOS: 1 days | Discharge: HOME | End: 2018-08-14

## 2018-08-14 DIAGNOSIS — Z98.890 OTHER SPECIFIED POSTPROCEDURAL STATES: Chronic | ICD-10-CM

## 2018-08-14 DIAGNOSIS — D64.9 ANEMIA, UNSPECIFIED: ICD-10-CM

## 2018-08-24 ENCOUNTER — RESULT REVIEW (OUTPATIENT)
Age: 68
End: 2018-08-24

## 2018-08-24 ENCOUNTER — APPOINTMENT (OUTPATIENT)
Dept: HEMATOLOGY ONCOLOGY | Facility: CLINIC | Age: 68
End: 2018-08-24
Payer: MEDICARE

## 2018-08-24 VITALS
BODY MASS INDEX: 37.07 KG/M2 | RESPIRATION RATE: 16 BRPM | DIASTOLIC BLOOD PRESSURE: 75 MMHG | SYSTOLIC BLOOD PRESSURE: 169 MMHG | HEART RATE: 63 BPM | WEIGHT: 203.99 LBS | TEMPERATURE: 97.6 F | OXYGEN SATURATION: 98 %

## 2018-08-24 LAB
ANISOCYTOSIS BLD QL: SLIGHT — SIGNIFICANT CHANGE UP
BASOPHILS # BLD AUTO: 0.1 K/UL — SIGNIFICANT CHANGE UP (ref 0–0.2)
BASOPHILS NFR BLD AUTO: 1 % — SIGNIFICANT CHANGE UP (ref 0–2)
EOSINOPHIL # BLD AUTO: 2.9 K/UL — HIGH (ref 0–0.5)
EOSINOPHIL NFR BLD AUTO: 35 % — HIGH (ref 0–6)
HCT VFR BLD CALC: 37.2 % — SIGNIFICANT CHANGE UP (ref 34.5–45)
HGB BLD-MCNC: 12.3 G/DL — SIGNIFICANT CHANGE UP (ref 11.5–15.5)
LYMPHOCYTES # BLD AUTO: 19 % — SIGNIFICANT CHANGE UP (ref 13–44)
LYMPHOCYTES # BLD AUTO: 2.2 K/UL — SIGNIFICANT CHANGE UP (ref 1–3.3)
MACROCYTES BLD QL: SLIGHT — SIGNIFICANT CHANGE UP
MCHC RBC-ENTMCNC: 28.4 PG — SIGNIFICANT CHANGE UP (ref 27–34)
MCHC RBC-ENTMCNC: 33.1 G/DL — SIGNIFICANT CHANGE UP (ref 32–36)
MCV RBC AUTO: 85.6 FL — SIGNIFICANT CHANGE UP (ref 80–100)
MICROCYTES BLD QL: SLIGHT — SIGNIFICANT CHANGE UP
MONOCYTES # BLD AUTO: 0.6 K/UL — SIGNIFICANT CHANGE UP (ref 0–0.9)
MONOCYTES NFR BLD AUTO: 6 % — SIGNIFICANT CHANGE UP (ref 2–14)
NEUTROPHILS # BLD AUTO: 4.6 K/UL — SIGNIFICANT CHANGE UP (ref 1.8–7.4)
NEUTROPHILS NFR BLD AUTO: 39 % — LOW (ref 43–77)
PLAT MORPH BLD: NORMAL — SIGNIFICANT CHANGE UP
PLATELET # BLD AUTO: 270 K/UL — SIGNIFICANT CHANGE UP (ref 150–400)
POIKILOCYTOSIS BLD QL AUTO: SLIGHT — SIGNIFICANT CHANGE UP
POLYCHROMASIA BLD QL SMEAR: SLIGHT — SIGNIFICANT CHANGE UP
RBC # BLD: 4.34 M/UL — SIGNIFICANT CHANGE UP (ref 3.8–5.2)
RBC # FLD: 12.7 % — SIGNIFICANT CHANGE UP (ref 10.3–14.5)
RBC BLD AUTO: SIGNIFICANT CHANGE UP
WBC # BLD: 10.4 K/UL — SIGNIFICANT CHANGE UP (ref 3.8–10.5)
WBC # FLD AUTO: 10.4 K/UL — SIGNIFICANT CHANGE UP (ref 3.8–10.5)

## 2018-08-24 PROCEDURE — 99213 OFFICE O/P EST LOW 20 MIN: CPT

## 2018-08-24 RX ORDER — FUROSEMIDE 40 MG/1
40 TABLET ORAL DAILY
Qty: 3 | Refills: 0 | Status: ACTIVE | COMMUNITY
Start: 2018-08-24 | End: 1900-01-01

## 2018-08-27 LAB
ALBUMIN SERPL ELPH-MCNC: 4 G/DL
ALP BLD-CCNC: 90 U/L
ALT SERPL-CCNC: 11 U/L
ANION GAP SERPL CALC-SCNC: 14 MMOL/L
AST SERPL-CCNC: 17 U/L
BILIRUB SERPL-MCNC: 0.4 MG/DL
BUN SERPL-MCNC: 12 MG/DL
CALCIUM SERPL-MCNC: 9.6 MG/DL
CHLORIDE SERPL-SCNC: 103 MMOL/L
CO2 SERPL-SCNC: 26 MMOL/L
CREAT SERPL-MCNC: 0.83 MG/DL
GLUCOSE SERPL-MCNC: 108 MG/DL
HAPTOGLOB SERPL-MCNC: 138 MG/DL
LDH SERPL-CCNC: 198 U/L
POTASSIUM SERPL-SCNC: 3.9 MMOL/L
PROT SERPL-MCNC: 6.7 G/DL
SODIUM SERPL-SCNC: 143 MMOL/L

## 2018-10-11 ENCOUNTER — OUTPATIENT (OUTPATIENT)
Dept: OUTPATIENT SERVICES | Facility: HOSPITAL | Age: 68
LOS: 1 days | Discharge: HOME | End: 2018-10-11

## 2018-10-11 DIAGNOSIS — D64.9 ANEMIA, UNSPECIFIED: ICD-10-CM

## 2018-10-11 DIAGNOSIS — Z98.890 OTHER SPECIFIED POSTPROCEDURAL STATES: Chronic | ICD-10-CM

## 2018-10-19 ENCOUNTER — RESULT REVIEW (OUTPATIENT)
Age: 68
End: 2018-10-19

## 2018-10-19 ENCOUNTER — APPOINTMENT (OUTPATIENT)
Dept: HEMATOLOGY ONCOLOGY | Facility: CLINIC | Age: 68
End: 2018-10-19
Payer: MEDICARE

## 2018-10-19 VITALS
HEART RATE: 56 BPM | BODY MASS INDEX: 37.34 KG/M2 | TEMPERATURE: 97.8 F | SYSTOLIC BLOOD PRESSURE: 141 MMHG | OXYGEN SATURATION: 98 % | WEIGHT: 205.47 LBS | RESPIRATION RATE: 16 BRPM | DIASTOLIC BLOOD PRESSURE: 66 MMHG

## 2018-10-19 LAB
BASOPHILS # BLD AUTO: 0.1 K/UL — SIGNIFICANT CHANGE UP (ref 0–0.2)
BASOPHILS NFR BLD AUTO: 0.7 % — SIGNIFICANT CHANGE UP (ref 0–2)
EOSINOPHIL # BLD AUTO: 1.6 K/UL — HIGH (ref 0–0.5)
EOSINOPHIL NFR BLD AUTO: 19.9 % — HIGH (ref 0–6)
HCT VFR BLD CALC: 35.5 % — SIGNIFICANT CHANGE UP (ref 34.5–45)
HGB BLD-MCNC: 12.3 G/DL — SIGNIFICANT CHANGE UP (ref 11.5–15.5)
LYMPHOCYTES # BLD AUTO: 2 K/UL — SIGNIFICANT CHANGE UP (ref 1–3.3)
LYMPHOCYTES # BLD AUTO: 23.6 % — SIGNIFICANT CHANGE UP (ref 13–44)
MCHC RBC-ENTMCNC: 28.7 PG — SIGNIFICANT CHANGE UP (ref 27–34)
MCHC RBC-ENTMCNC: 34.6 G/DL — SIGNIFICANT CHANGE UP (ref 32–36)
MCV RBC AUTO: 83.1 FL — SIGNIFICANT CHANGE UP (ref 80–100)
MONOCYTES # BLD AUTO: 0.5 K/UL — SIGNIFICANT CHANGE UP (ref 0–0.9)
MONOCYTES NFR BLD AUTO: 6.4 % — SIGNIFICANT CHANGE UP (ref 2–14)
NEUTROPHILS # BLD AUTO: 4.1 K/UL — SIGNIFICANT CHANGE UP (ref 1.8–7.4)
NEUTROPHILS NFR BLD AUTO: 49.4 % — SIGNIFICANT CHANGE UP (ref 43–77)
PLATELET # BLD AUTO: 246 K/UL — SIGNIFICANT CHANGE UP (ref 150–400)
RBC # BLD: 4.27 M/UL — SIGNIFICANT CHANGE UP (ref 3.8–5.2)
RBC # FLD: 12.8 % — SIGNIFICANT CHANGE UP (ref 10.3–14.5)
RETICS #: 87.9 K/UL — SIGNIFICANT CHANGE UP (ref 25–125)
RETICS/RBC NFR: 2.1 % — SIGNIFICANT CHANGE UP (ref 0.5–2.5)
WBC # BLD: 8.3 K/UL — SIGNIFICANT CHANGE UP (ref 3.8–10.5)
WBC # FLD AUTO: 8.3 K/UL — SIGNIFICANT CHANGE UP (ref 3.8–10.5)

## 2018-10-19 PROCEDURE — 99213 OFFICE O/P EST LOW 20 MIN: CPT

## 2018-10-24 LAB
ALBUMIN SERPL ELPH-MCNC: 3.8 G/DL
ALP BLD-CCNC: 86 U/L
ALT SERPL-CCNC: 17 U/L
ANION GAP SERPL CALC-SCNC: 14 MMOL/L
AST SERPL-CCNC: 17 U/L
BILIRUB SERPL-MCNC: 0.4 MG/DL
BUN SERPL-MCNC: 12 MG/DL
CALCIUM SERPL-MCNC: 9.3 MG/DL
CHLORIDE SERPL-SCNC: 107 MMOL/L
CO2 SERPL-SCNC: 25 MMOL/L
CREAT SERPL-MCNC: 0.7 MG/DL
GLUCOSE SERPL-MCNC: 96 MG/DL
HAPTOGLOB SERPL-MCNC: 147 MG/DL
LDH SERPL-CCNC: 172 U/L
POTASSIUM SERPL-SCNC: 3.9 MMOL/L
PROT SERPL-MCNC: 6.6 G/DL
SODIUM SERPL-SCNC: 146 MMOL/L

## 2018-11-16 ENCOUNTER — OUTPATIENT (OUTPATIENT)
Dept: OUTPATIENT SERVICES | Facility: HOSPITAL | Age: 68
LOS: 1 days | Discharge: HOME | End: 2018-11-16

## 2018-11-16 DIAGNOSIS — D64.9 ANEMIA, UNSPECIFIED: ICD-10-CM

## 2018-11-16 DIAGNOSIS — Z98.890 OTHER SPECIFIED POSTPROCEDURAL STATES: Chronic | ICD-10-CM

## 2018-11-19 ENCOUNTER — RESULT REVIEW (OUTPATIENT)
Age: 68
End: 2018-11-19

## 2018-11-19 ENCOUNTER — APPOINTMENT (OUTPATIENT)
Dept: HEMATOLOGY ONCOLOGY | Facility: CLINIC | Age: 68
End: 2018-11-19
Payer: MEDICARE

## 2018-11-19 VITALS
RESPIRATION RATE: 16 BRPM | HEART RATE: 56 BPM | WEIGHT: 205.25 LBS | TEMPERATURE: 97.3 F | BODY MASS INDEX: 37.3 KG/M2 | SYSTOLIC BLOOD PRESSURE: 167 MMHG | OXYGEN SATURATION: 96 % | DIASTOLIC BLOOD PRESSURE: 75 MMHG

## 2018-11-19 LAB
BASOPHILS # BLD AUTO: 0.1 K/UL — SIGNIFICANT CHANGE UP (ref 0–0.2)
EOSINOPHIL # BLD AUTO: 2.2 K/UL — HIGH (ref 0–0.5)
EOSINOPHIL NFR BLD AUTO: 15 % — HIGH (ref 0–6)
HCT VFR BLD CALC: 36.5 % — SIGNIFICANT CHANGE UP (ref 34.5–45)
HGB BLD-MCNC: 12.2 G/DL — SIGNIFICANT CHANGE UP (ref 11.5–15.5)
LYMPHOCYTES # BLD AUTO: 2.2 K/UL — SIGNIFICANT CHANGE UP (ref 1–3.3)
LYMPHOCYTES # BLD AUTO: 26 % — SIGNIFICANT CHANGE UP (ref 13–44)
MCHC RBC-ENTMCNC: 27.7 PG — SIGNIFICANT CHANGE UP (ref 27–34)
MCHC RBC-ENTMCNC: 33.4 G/DL — SIGNIFICANT CHANGE UP (ref 32–36)
MCV RBC AUTO: 83 FL — SIGNIFICANT CHANGE UP (ref 80–100)
MONOCYTES # BLD AUTO: 0.6 K/UL — SIGNIFICANT CHANGE UP (ref 0–0.9)
MONOCYTES NFR BLD AUTO: 5 % — SIGNIFICANT CHANGE UP (ref 2–14)
NEUTROPHILS # BLD AUTO: 4.3 K/UL — SIGNIFICANT CHANGE UP (ref 1.8–7.4)
NEUTROPHILS NFR BLD AUTO: 54 % — SIGNIFICANT CHANGE UP (ref 43–77)
PLAT MORPH BLD: NORMAL — SIGNIFICANT CHANGE UP
PLATELET # BLD AUTO: 292 K/UL — SIGNIFICANT CHANGE UP (ref 150–400)
RBC # BLD: 4.4 M/UL — SIGNIFICANT CHANGE UP (ref 3.8–5.2)
RBC # FLD: 12.5 % — SIGNIFICANT CHANGE UP (ref 10.3–14.5)
RBC BLD AUTO: SIGNIFICANT CHANGE UP
RETICS #: 88.6 K/UL — SIGNIFICANT CHANGE UP (ref 25–125)
RETICS/RBC NFR: 2 % — SIGNIFICANT CHANGE UP (ref 0.5–2.5)
WBC # BLD: 9.3 K/UL — SIGNIFICANT CHANGE UP (ref 3.8–10.5)
WBC # FLD AUTO: 9.3 K/UL — SIGNIFICANT CHANGE UP (ref 3.8–10.5)

## 2018-11-19 PROCEDURE — 99213 OFFICE O/P EST LOW 20 MIN: CPT

## 2018-11-19 NOTE — REASON FOR VISIT
[Follow-Up Visit] : a follow-up visit for [[employer] ] : provided by [employer]   [FreeTextEntry2] : ANNA [FreeTextEntry1] : [id number]

## 2018-11-19 NOTE — ASSESSMENT
[FreeTextEntry1] : 69yo F w/ warm AIHA, s/p prednisone \par Hgb 12.2 today, now tapered off steroids since Aug 2018\par f/u LDH, hapto\par PMD f/u\par RTC 2 mo

## 2018-11-19 NOTE — HISTORY OF PRESENT ILLNESS
[de-identified] : 68yo F w/ HTN, CAD, GERD presented to the ED on 5/2/18 with 2 days of subjective fever and 3 months of left sided ear fullness and tinnitus. Blood work showed Hgb 6.3, further w/u consistent for hemolytic anemia - elevated retic count, elevated LDH, low hapto, indirect hyperbilirubinemia, direct Paolo positive for IgG. Negative stool occult. She was started on 90 mg prednisone on 5/3. CT was done which did not show any malignancy. \par \par She was discharged on prednisone 90mg. Hgb 7.0 on day of discharge, received 1u PRBCs prior to discharge. \par Her children live elsewhere around the country. She lives with her .   [de-identified] : Went to the ED on 5/30 for a vesicular rash in area of erythema on chest and back - diagnosed with zoster and now on Valtrex. Postherpetic neuralgia has improved, pharmacy did not get the gabapentin script so she has not used it. \par She is now tapered off prednisone since Aug 2018. \par She is still having swelling and pain of b/l feet. \par \par She is going to [place] on Dec 20 - sometime in Jan. \par

## 2018-11-19 NOTE — PHYSICAL EXAM
[Restricted in physically strenuous activity but ambulatory and able to carry out work of a light or sedentary nature] : Status 1- Restricted in physically strenuous activity but ambulatory and able to carry out work of a light or sedentary nature, e.g., light house work, office work [Normal] : affect appropriate [de-identified] : mild LE edema [de-identified] : zoster has healed

## 2018-11-20 LAB
ALBUMIN SERPL ELPH-MCNC: 3.9 G/DL
ALP BLD-CCNC: 96 U/L
ALT SERPL-CCNC: 16 U/L
ANION GAP SERPL CALC-SCNC: 9 MMOL/L
AST SERPL-CCNC: 18 U/L
BILIRUB SERPL-MCNC: 0.3 MG/DL
BUN SERPL-MCNC: 14 MG/DL
CALCIUM SERPL-MCNC: 9.6 MG/DL
CHLORIDE SERPL-SCNC: 105 MMOL/L
CO2 SERPL-SCNC: 29 MMOL/L
CREAT SERPL-MCNC: 0.71 MG/DL
GLUCOSE SERPL-MCNC: 126 MG/DL
HAPTOGLOB SERPL-MCNC: 164 MG/DL
LDH SERPL-CCNC: 166 U/L
POTASSIUM SERPL-SCNC: 3.9 MMOL/L
PROT SERPL-MCNC: 6.7 G/DL
SODIUM SERPL-SCNC: 143 MMOL/L

## 2019-01-23 ENCOUNTER — OUTPATIENT (OUTPATIENT)
Dept: OUTPATIENT SERVICES | Facility: HOSPITAL | Age: 69
LOS: 1 days | Discharge: HOME | End: 2019-01-23

## 2019-01-23 DIAGNOSIS — D64.9 ANEMIA, UNSPECIFIED: ICD-10-CM

## 2019-01-23 DIAGNOSIS — Z98.890 OTHER SPECIFIED POSTPROCEDURAL STATES: Chronic | ICD-10-CM

## 2019-01-28 ENCOUNTER — RESULT REVIEW (OUTPATIENT)
Age: 69
End: 2019-01-28

## 2019-01-28 ENCOUNTER — APPOINTMENT (OUTPATIENT)
Dept: HEMATOLOGY ONCOLOGY | Facility: CLINIC | Age: 69
End: 2019-01-28
Payer: MEDICARE

## 2019-01-28 VITALS
SYSTOLIC BLOOD PRESSURE: 176 MMHG | BODY MASS INDEX: 36.46 KG/M2 | DIASTOLIC BLOOD PRESSURE: 78 MMHG | WEIGHT: 200.62 LBS | TEMPERATURE: 97.6 F | OXYGEN SATURATION: 96 % | HEART RATE: 49 BPM | RESPIRATION RATE: 16 BRPM

## 2019-01-28 LAB
BASOPHILS # BLD AUTO: 0.1 K/UL — SIGNIFICANT CHANGE UP (ref 0–0.2)
BASOPHILS NFR BLD AUTO: 1 % — SIGNIFICANT CHANGE UP (ref 0–2)
EOSINOPHIL # BLD AUTO: 1.6 K/UL — HIGH (ref 0–0.5)
EOSINOPHIL NFR BLD AUTO: 23 % — HIGH (ref 0–6)
HCT VFR BLD CALC: 38 % — SIGNIFICANT CHANGE UP (ref 34.5–45)
HGB BLD-MCNC: 13.1 G/DL — SIGNIFICANT CHANGE UP (ref 11.5–15.5)
LYMPHOCYTES # BLD AUTO: 1.9 K/UL — SIGNIFICANT CHANGE UP (ref 1–3.3)
LYMPHOCYTES # BLD AUTO: 28 % — SIGNIFICANT CHANGE UP (ref 13–44)
MCHC RBC-ENTMCNC: 28.1 PG — SIGNIFICANT CHANGE UP (ref 27–34)
MCHC RBC-ENTMCNC: 34.4 G/DL — SIGNIFICANT CHANGE UP (ref 32–36)
MCV RBC AUTO: 81.9 FL — SIGNIFICANT CHANGE UP (ref 80–100)
MONOCYTES # BLD AUTO: 0.4 K/UL — SIGNIFICANT CHANGE UP (ref 0–0.9)
MONOCYTES NFR BLD AUTO: 2 % — SIGNIFICANT CHANGE UP (ref 2–14)
NEUTROPHILS # BLD AUTO: 3.6 K/UL — SIGNIFICANT CHANGE UP (ref 1.8–7.4)
NEUTROPHILS NFR BLD AUTO: 46 % — SIGNIFICANT CHANGE UP (ref 43–77)
PLAT MORPH BLD: NORMAL — SIGNIFICANT CHANGE UP
PLATELET # BLD AUTO: 249 K/UL — SIGNIFICANT CHANGE UP (ref 150–400)
RBC # BLD: 4.64 M/UL — SIGNIFICANT CHANGE UP (ref 3.8–5.2)
RBC # FLD: 12.4 % — SIGNIFICANT CHANGE UP (ref 10.3–14.5)
RBC BLD AUTO: NORMAL — SIGNIFICANT CHANGE UP
RETICS #: 107 K/UL — SIGNIFICANT CHANGE UP (ref 25–125)
RETICS/RBC NFR: 2.3 % — SIGNIFICANT CHANGE UP (ref 0.5–2.5)
WBC # BLD: 7.4 K/UL — SIGNIFICANT CHANGE UP (ref 3.8–10.5)
WBC # FLD AUTO: 7.4 K/UL — SIGNIFICANT CHANGE UP (ref 3.8–10.5)

## 2019-01-28 PROCEDURE — 99213 OFFICE O/P EST LOW 20 MIN: CPT

## 2019-01-28 NOTE — PHYSICAL EXAM
[Restricted in physically strenuous activity but ambulatory and able to carry out work of a light or sedentary nature] : Status 1- Restricted in physically strenuous activity but ambulatory and able to carry out work of a light or sedentary nature, e.g., light house work, office work [Normal] : no JVD, no calf tenderness, venous stasis changes, varices [de-identified] : zoster has healed

## 2019-01-28 NOTE — HISTORY OF PRESENT ILLNESS
[de-identified] : 66yo F w/ HTN, CAD, GERD presented to the ED on 5/2/18 with 2 days of subjective fever and 3 months of left sided ear fullness and tinnitus. Blood work showed Hgb 6.3, further w/u consistent for hemolytic anemia - elevated retic count, elevated LDH, low hapto, indirect hyperbilirubinemia, direct Paolo positive for IgG. Negative stool occult. She was started on 90 mg prednisone on 5/3. CT was done which did not show any malignancy. \par \par She was discharged on prednisone 90mg. Hgb 7.0 on day of discharge, received 1u PRBCs prior to discharge. \par Her children live elsewhere around the country. She lives with her .   [de-identified] : Went to the ED on 5/30 for a vesicular rash in area of erythema on chest and back - diagnosed with zoster and now on Valtrex. Postherpetic neuralgia has improved, pharmacy did not get the gabapentin script so she has not used it. \par She is now tapered off prednisone since Aug 2018. \par She is still having swelling and pain of b/l feet. Otherwise, doing well. \par She was in [place] for the last month. \par

## 2019-01-28 NOTE — REASON FOR VISIT
[Follow-Up Visit] : a follow-up visit for [[employer] ] : provided by [employer]   [FreeTextEntry2] : ANNA [FreeTextEntry1] : [id number]

## 2019-01-28 NOTE — ASSESSMENT
[FreeTextEntry1] : 67yo F w/ warm AIHA, s/p prednisone \par Hgb 13.1 today, now tapered off steroids since Aug 2018\par f/u LDH, hapto\par PMD f/u\par RTC 3 mo

## 2019-01-29 LAB
ALBUMIN SERPL ELPH-MCNC: 3.9 G/DL
ALP BLD-CCNC: 96 U/L
ALT SERPL-CCNC: 15 U/L
ANION GAP SERPL CALC-SCNC: 9 MMOL/L
AST SERPL-CCNC: 19 U/L
BILIRUB SERPL-MCNC: 0.4 MG/DL
BUN SERPL-MCNC: 11 MG/DL
CALCIUM SERPL-MCNC: 9.3 MG/DL
CHLORIDE SERPL-SCNC: 108 MMOL/L
CO2 SERPL-SCNC: 27 MMOL/L
CREAT SERPL-MCNC: 0.67 MG/DL
GLUCOSE SERPL-MCNC: 102 MG/DL
HAPTOGLOB SERPL-MCNC: 155 MG/DL
LDH SERPL-CCNC: 149 U/L
POTASSIUM SERPL-SCNC: 3.8 MMOL/L
PROT SERPL-MCNC: 6.6 G/DL
SODIUM SERPL-SCNC: 144 MMOL/L

## 2019-04-11 ENCOUNTER — OUTPATIENT (OUTPATIENT)
Dept: OUTPATIENT SERVICES | Facility: HOSPITAL | Age: 69
LOS: 1 days | Discharge: HOME | End: 2019-04-11

## 2019-04-11 DIAGNOSIS — D64.9 ANEMIA, UNSPECIFIED: ICD-10-CM

## 2019-04-11 DIAGNOSIS — Z98.890 OTHER SPECIFIED POSTPROCEDURAL STATES: Chronic | ICD-10-CM

## 2019-04-17 ENCOUNTER — RESULT REVIEW (OUTPATIENT)
Age: 69
End: 2019-04-17

## 2019-04-17 ENCOUNTER — APPOINTMENT (OUTPATIENT)
Dept: HEMATOLOGY ONCOLOGY | Facility: CLINIC | Age: 69
End: 2019-04-17
Payer: MEDICARE

## 2019-04-17 VITALS
DIASTOLIC BLOOD PRESSURE: 78 MMHG | HEART RATE: 55 BPM | RESPIRATION RATE: 16 BRPM | OXYGEN SATURATION: 97 % | BODY MASS INDEX: 36.98 KG/M2 | TEMPERATURE: 97.6 F | SYSTOLIC BLOOD PRESSURE: 146 MMHG | WEIGHT: 203.48 LBS

## 2019-04-17 LAB
BASOPHILS # BLD AUTO: 0.1 K/UL — SIGNIFICANT CHANGE UP (ref 0–0.2)
BASOPHILS NFR BLD AUTO: 1 % — SIGNIFICANT CHANGE UP (ref 0–2)
EOSINOPHIL # BLD AUTO: 1.8 K/UL — HIGH (ref 0–0.5)
EOSINOPHIL NFR BLD AUTO: 19.4 % — HIGH (ref 0–6)
HCT VFR BLD CALC: 36.4 % — SIGNIFICANT CHANGE UP (ref 34.5–45)
HGB BLD-MCNC: 12.6 G/DL — SIGNIFICANT CHANGE UP (ref 11.5–15.5)
LYMPHOCYTES # BLD AUTO: 2.6 K/UL — SIGNIFICANT CHANGE UP (ref 1–3.3)
LYMPHOCYTES # BLD AUTO: 27.2 % — SIGNIFICANT CHANGE UP (ref 13–44)
MCHC RBC-ENTMCNC: 28.9 PG — SIGNIFICANT CHANGE UP (ref 27–34)
MCHC RBC-ENTMCNC: 34.6 G/DL — SIGNIFICANT CHANGE UP (ref 32–36)
MCV RBC AUTO: 83.6 FL — SIGNIFICANT CHANGE UP (ref 80–100)
MONOCYTES # BLD AUTO: 0.6 K/UL — SIGNIFICANT CHANGE UP (ref 0–0.9)
MONOCYTES NFR BLD AUTO: 6.2 % — SIGNIFICANT CHANGE UP (ref 2–14)
NEUTROPHILS # BLD AUTO: 4.4 K/UL — SIGNIFICANT CHANGE UP (ref 1.8–7.4)
NEUTROPHILS NFR BLD AUTO: 46.3 % — SIGNIFICANT CHANGE UP (ref 43–77)
PLATELET # BLD AUTO: 315 K/UL — SIGNIFICANT CHANGE UP (ref 150–400)
RBC # BLD: 4.35 M/UL — SIGNIFICANT CHANGE UP (ref 3.8–5.2)
RBC # FLD: 12.6 % — SIGNIFICANT CHANGE UP (ref 10.3–14.5)
RETICS #: 85.2 K/UL — SIGNIFICANT CHANGE UP (ref 25–125)
RETICS/RBC NFR: 2 % — SIGNIFICANT CHANGE UP (ref 0.5–2.5)
WBC # BLD: 9.4 K/UL — SIGNIFICANT CHANGE UP (ref 3.8–10.5)
WBC # FLD AUTO: 9.4 K/UL — SIGNIFICANT CHANGE UP (ref 3.8–10.5)

## 2019-04-17 PROCEDURE — 99213 OFFICE O/P EST LOW 20 MIN: CPT

## 2019-04-17 NOTE — ASSESSMENT
[FreeTextEntry1] : 69yo F w/ warm AIHA, s/p prednisone \par Hgb 12.6 today, now tapered off steroids since Aug 2018\par f/u retic, LDH, hapto\par PMD f/u\par RTC 3 mo

## 2019-04-17 NOTE — REASON FOR VISIT
[Follow-Up Visit] : a follow-up visit for [[employer] ] : provided by [employer]   [FreeTextEntry2] : ANNA [FreeTextEntry1] : [id number]

## 2019-04-17 NOTE — HISTORY OF PRESENT ILLNESS
[de-identified] : 66yo F w/ HTN, CAD, GERD presented to the ED on 5/2/18 with 2 days of subjective fever and 3 months of left sided ear fullness and tinnitus. Blood work showed Hgb 6.3, further w/u consistent for hemolytic anemia - elevated retic count, elevated LDH, low hapto, indirect hyperbilirubinemia, direct Paolo positive for IgG. Negative stool occult. She was started on 90 mg prednisone on 5/3/18. CT was done which did not show any malignancy. \par \par She was discharged on prednisone 90mg. Hgb 7.0 on day of discharge, received 1u PRBCs prior to discharge. \par Her children live elsewhere around the country. She lives with her .  \par \shea Went to the ED on 5/30/18 for a vesicular rash in area of erythema on chest and back - diagnosed with zoster and started on Valtrex.  [de-identified] : She is now tapered off prednisone since Aug 2018. \par \par For the last week, feeling cold - no fevers or chills. She went to PMD yesterday, found to have blood in urine. Sent to kidney specialist\par Otherwise, doing well. \par

## 2019-04-17 NOTE — PHYSICAL EXAM
[Restricted in physically strenuous activity but ambulatory and able to carry out work of a light or sedentary nature] : Status 1- Restricted in physically strenuous activity but ambulatory and able to carry out work of a light or sedentary nature, e.g., light house work, office work [Normal] : pharynx is unremarkable, moist mucus membrane, no oral lesions [de-identified] : zoster has healed

## 2019-04-18 LAB
ALBUMIN SERPL ELPH-MCNC: 4.2 G/DL
ALP BLD-CCNC: 120 U/L
ALT SERPL-CCNC: 15 U/L
ANION GAP SERPL CALC-SCNC: 13 MMOL/L
AST SERPL-CCNC: 17 U/L
BILIRUB SERPL-MCNC: 0.3 MG/DL
BUN SERPL-MCNC: 9 MG/DL
CALCIUM SERPL-MCNC: 9.6 MG/DL
CHLORIDE SERPL-SCNC: 104 MMOL/L
CO2 SERPL-SCNC: 25 MMOL/L
CREAT SERPL-MCNC: 0.63 MG/DL
GLUCOSE SERPL-MCNC: 111 MG/DL
HAPTOGLOB SERPL-MCNC: 183 MG/DL
LDH SERPL-CCNC: 171 U/L
POTASSIUM SERPL-SCNC: 4.1 MMOL/L
PROT SERPL-MCNC: 6.8 G/DL
SODIUM SERPL-SCNC: 142 MMOL/L

## 2019-06-26 ENCOUNTER — EMERGENCY (EMERGENCY)
Facility: HOSPITAL | Age: 69
LOS: 1 days | Discharge: HOME | End: 2019-06-26
Attending: EMERGENCY MEDICINE | Admitting: EMERGENCY MEDICINE
Payer: MEDICARE

## 2019-06-26 VITALS
HEART RATE: 85 BPM | TEMPERATURE: 99 F | SYSTOLIC BLOOD PRESSURE: 162 MMHG | DIASTOLIC BLOOD PRESSURE: 67 MMHG | OXYGEN SATURATION: 97 % | RESPIRATION RATE: 17 BRPM

## 2019-06-26 VITALS
RESPIRATION RATE: 16 BRPM | DIASTOLIC BLOOD PRESSURE: 81 MMHG | OXYGEN SATURATION: 96 % | SYSTOLIC BLOOD PRESSURE: 175 MMHG | HEART RATE: 92 BPM | TEMPERATURE: 103 F

## 2019-06-26 DIAGNOSIS — Z98.890 OTHER SPECIFIED POSTPROCEDURAL STATES: Chronic | ICD-10-CM

## 2019-06-26 LAB
ALBUMIN SERPL ELPH-MCNC: 3.9 G/DL — SIGNIFICANT CHANGE UP (ref 3.3–5)
ALP SERPL-CCNC: 99 U/L — SIGNIFICANT CHANGE UP (ref 40–120)
ALT FLD-CCNC: 17 U/L — SIGNIFICANT CHANGE UP (ref 4–33)
ANION GAP SERPL CALC-SCNC: 12 MMO/L — SIGNIFICANT CHANGE UP (ref 7–14)
APPEARANCE UR: CLEAR — SIGNIFICANT CHANGE UP
APTT BLD: 31.6 SEC — SIGNIFICANT CHANGE UP (ref 27.5–36.3)
AST SERPL-CCNC: 14 U/L — SIGNIFICANT CHANGE UP (ref 4–32)
BACTERIA # UR AUTO: NEGATIVE — SIGNIFICANT CHANGE UP
BASE EXCESS BLDV CALC-SCNC: 3 MMOL/L — SIGNIFICANT CHANGE UP
BASOPHILS # BLD AUTO: 0.04 K/UL — SIGNIFICANT CHANGE UP (ref 0–0.2)
BASOPHILS NFR BLD AUTO: 0.4 % — SIGNIFICANT CHANGE UP (ref 0–2)
BILIRUB SERPL-MCNC: 0.7 MG/DL — SIGNIFICANT CHANGE UP (ref 0.2–1.2)
BILIRUB UR-MCNC: NEGATIVE — SIGNIFICANT CHANGE UP
BLOOD GAS VENOUS - CREATININE: SIGNIFICANT CHANGE UP MG/DL (ref 0.5–1.3)
BLOOD UR QL VISUAL: SIGNIFICANT CHANGE UP
BUN SERPL-MCNC: 10 MG/DL — SIGNIFICANT CHANGE UP (ref 7–23)
CALCIUM SERPL-MCNC: 9.2 MG/DL — SIGNIFICANT CHANGE UP (ref 8.4–10.5)
CHLORIDE BLDV-SCNC: 105 MMOL/L — SIGNIFICANT CHANGE UP (ref 96–108)
CHLORIDE SERPL-SCNC: 102 MMOL/L — SIGNIFICANT CHANGE UP (ref 98–107)
CO2 SERPL-SCNC: 24 MMOL/L — SIGNIFICANT CHANGE UP (ref 22–31)
COLOR SPEC: SIGNIFICANT CHANGE UP
CREAT SERPL-MCNC: 0.69 MG/DL — SIGNIFICANT CHANGE UP (ref 0.5–1.3)
EOSINOPHIL # BLD AUTO: 0.13 K/UL — SIGNIFICANT CHANGE UP (ref 0–0.5)
EOSINOPHIL NFR BLD AUTO: 1.2 % — SIGNIFICANT CHANGE UP (ref 0–6)
EPI CELLS # UR: SIGNIFICANT CHANGE UP
GAS PNL BLDV: 136 MMOL/L — SIGNIFICANT CHANGE UP (ref 136–146)
GLUCOSE BLDV-MCNC: 119 MG/DL — HIGH (ref 70–99)
GLUCOSE SERPL-MCNC: 121 MG/DL — HIGH (ref 70–99)
GLUCOSE UR-MCNC: NEGATIVE — SIGNIFICANT CHANGE UP
HCO3 BLDV-SCNC: 27 MMOL/L — SIGNIFICANT CHANGE UP (ref 20–27)
HCT VFR BLD CALC: 37.4 % — SIGNIFICANT CHANGE UP (ref 34.5–45)
HCT VFR BLDV CALC: 38.8 % — SIGNIFICANT CHANGE UP (ref 34.5–45)
HGB BLD-MCNC: 12.3 G/DL — SIGNIFICANT CHANGE UP (ref 11.5–15.5)
HGB BLDV-MCNC: 12.6 G/DL — SIGNIFICANT CHANGE UP (ref 11.5–15.5)
HYALINE CASTS # UR AUTO: NEGATIVE — SIGNIFICANT CHANGE UP
IMM GRANULOCYTES NFR BLD AUTO: 0.5 % — SIGNIFICANT CHANGE UP (ref 0–1.5)
INR BLD: 1.04 — SIGNIFICANT CHANGE UP (ref 0.88–1.17)
KETONES UR-MCNC: NEGATIVE — SIGNIFICANT CHANGE UP
LACTATE BLDV-MCNC: 1 MMOL/L — SIGNIFICANT CHANGE UP (ref 0.5–2)
LEUKOCYTE ESTERASE UR-ACNC: SIGNIFICANT CHANGE UP
LYMPHOCYTES # BLD AUTO: 0.88 K/UL — LOW (ref 1–3.3)
LYMPHOCYTES # BLD AUTO: 7.9 % — LOW (ref 13–44)
MCHC RBC-ENTMCNC: 27.3 PG — SIGNIFICANT CHANGE UP (ref 27–34)
MCHC RBC-ENTMCNC: 32.9 % — SIGNIFICANT CHANGE UP (ref 32–36)
MCV RBC AUTO: 82.9 FL — SIGNIFICANT CHANGE UP (ref 80–100)
MONOCYTES # BLD AUTO: 0.46 K/UL — SIGNIFICANT CHANGE UP (ref 0–0.9)
MONOCYTES NFR BLD AUTO: 4.1 % — SIGNIFICANT CHANGE UP (ref 2–14)
NEUTROPHILS # BLD AUTO: 9.56 K/UL — HIGH (ref 1.8–7.4)
NEUTROPHILS NFR BLD AUTO: 85.9 % — HIGH (ref 43–77)
NITRITE UR-MCNC: NEGATIVE — SIGNIFICANT CHANGE UP
NRBC # FLD: 0 K/UL — SIGNIFICANT CHANGE UP (ref 0–0)
PCO2 BLDV: 39 MMHG — LOW (ref 41–51)
PH BLDV: 7.45 PH — HIGH (ref 7.32–7.43)
PH UR: 7 — SIGNIFICANT CHANGE UP (ref 5–8)
PLATELET # BLD AUTO: 258 K/UL — SIGNIFICANT CHANGE UP (ref 150–400)
PMV BLD: 9.2 FL — SIGNIFICANT CHANGE UP (ref 7–13)
PO2 BLDV: 46 MMHG — HIGH (ref 35–40)
POTASSIUM BLDV-SCNC: 3.2 MMOL/L — LOW (ref 3.4–4.5)
POTASSIUM SERPL-MCNC: 3.2 MMOL/L — LOW (ref 3.5–5.3)
POTASSIUM SERPL-SCNC: 3.2 MMOL/L — LOW (ref 3.5–5.3)
PROT SERPL-MCNC: 7.1 G/DL — SIGNIFICANT CHANGE UP (ref 6–8.3)
PROT UR-MCNC: NEGATIVE — SIGNIFICANT CHANGE UP
PROTHROM AB SERPL-ACNC: 11.6 SEC — SIGNIFICANT CHANGE UP (ref 9.8–13.1)
RBC # BLD: 4.51 M/UL — SIGNIFICANT CHANGE UP (ref 3.8–5.2)
RBC # FLD: 13.6 % — SIGNIFICANT CHANGE UP (ref 10.3–14.5)
RBC CASTS # UR COMP ASSIST: HIGH (ref 0–?)
SAO2 % BLDV: 83 % — SIGNIFICANT CHANGE UP (ref 60–85)
SODIUM SERPL-SCNC: 138 MMOL/L — SIGNIFICANT CHANGE UP (ref 135–145)
SP GR SPEC: 1.01 — SIGNIFICANT CHANGE UP (ref 1–1.04)
SQUAMOUS # UR AUTO: SIGNIFICANT CHANGE UP
UROBILINOGEN FLD QL: NORMAL — SIGNIFICANT CHANGE UP
WBC # BLD: 11.13 K/UL — HIGH (ref 3.8–10.5)
WBC # FLD AUTO: 11.13 K/UL — HIGH (ref 3.8–10.5)
WBC UR QL: SIGNIFICANT CHANGE UP (ref 0–?)

## 2019-06-26 PROCEDURE — 99284 EMERGENCY DEPT VISIT MOD MDM: CPT

## 2019-06-26 PROCEDURE — 74177 CT ABD & PELVIS W/CONTRAST: CPT | Mod: 26

## 2019-06-26 PROCEDURE — 71046 X-RAY EXAM CHEST 2 VIEWS: CPT | Mod: 26

## 2019-06-26 RX ORDER — ONDANSETRON 8 MG/1
4 TABLET, FILM COATED ORAL ONCE
Refills: 0 | Status: DISCONTINUED | OUTPATIENT
Start: 2019-06-26 | End: 2019-06-26

## 2019-06-26 RX ORDER — SODIUM CHLORIDE 9 MG/ML
1000 INJECTION INTRAMUSCULAR; INTRAVENOUS; SUBCUTANEOUS ONCE
Refills: 0 | Status: COMPLETED | OUTPATIENT
Start: 2019-06-26 | End: 2019-06-26

## 2019-06-26 RX ORDER — ONDANSETRON 8 MG/1
1 TABLET, FILM COATED ORAL
Qty: 12 | Refills: 0
Start: 2019-06-26 | End: 2019-06-29

## 2019-06-26 RX ORDER — ACETAMINOPHEN 500 MG
650 TABLET ORAL ONCE
Refills: 0 | Status: COMPLETED | OUTPATIENT
Start: 2019-06-26 | End: 2019-06-26

## 2019-06-26 RX ORDER — ONDANSETRON 8 MG/1
4 TABLET, FILM COATED ORAL ONCE
Refills: 0 | Status: COMPLETED | OUTPATIENT
Start: 2019-06-26 | End: 2019-06-26

## 2019-06-26 RX ADMIN — SODIUM CHLORIDE 1000 MILLILITER(S): 9 INJECTION INTRAMUSCULAR; INTRAVENOUS; SUBCUTANEOUS at 18:17

## 2019-06-26 RX ADMIN — Medication 650 MILLIGRAM(S): at 18:23

## 2019-06-26 RX ADMIN — ONDANSETRON 4 MILLIGRAM(S): 8 TABLET, FILM COATED ORAL at 18:23

## 2019-06-26 NOTE — ED ADULT NURSE REASSESSMENT NOTE - NS ED NURSE REASSESS COMMENT FT1
Patient in room 18, awake, alert, oriented x3, Jamaican speaking with little English.  Patient complaining of vomiting, diarrhea, and fever with mild stomach discomfort.  Patient febrile 102.2 orally, took Tylenol at 11 am prior to arrival.  PIV 20 G inserted to right AC, labs drawn as per provider order.  FAmily at bedside for emotional support.

## 2019-06-26 NOTE — ED PROVIDER NOTE - NSFOLLOWUPINSTRUCTIONS_ED_ALL_ED_FT
You were seen today for nausea and abdominal pain likely secondary to the food you ate. Please keep hydrated. You can take medication for nausea as needed.  Your CT scan showed a finding in your lung that should be followed up with your primary care doctor.   If you develop worsening nausea, vomiting, fevers, chills or any other concerning symptoms please come back to the emergency room.    Se le ha visto hoy por náuseas y dolor abdominal probablemente secundario a los alimentos que comió. Por favor, manténgase hidratado. Usted puede unruly medicamentos para las náuseas según sea necesario.  La tomografía computarizada mostró un hallazgo en el pulmón que se debe seguir con el médico de atención primaria.   Si presenta un empeoramiento de las náuseas, vómitos, fiebres, escalofríos o cualquier otro síntoma relacionado con los síntomas, vuelva a la chavez de emergencias.

## 2019-06-26 NOTE — ED PROVIDER NOTE - OBJECTIVE STATEMENT
67yo F, Mexican speaking, interpretation w/ [employer] ID # [id number], pt w/ pmhx of HTN, CAD, GERD, warm autoimmune hemolytic anemia presents to the ED c/o abdominal pain which began last night. Pt endorses abd bloating w/ associated nausea and multiple episodes of NBNB emesis and diarrhea. Pt last ate [employer] for dinner last night, reports her  experienced diarrhea but no other symptoms. +Subjective fevers and chills. Denies cp, sob, cough, ha, blurred vision, dizziness, dysuria.

## 2019-06-26 NOTE — ED ADULT NURSE REASSESSMENT NOTE - NS ED NURSE REASSESS COMMENT FT1
pt received alert and oriented x3.pt declines pain at the moment. respirations equal and unlabored. Call bell in reach, warm blanket provided, bed in lowest position, side rails up x2,safety maintained. will continue to monitor.

## 2019-06-26 NOTE — ED PROVIDER NOTE - NSFOLLOWUPCLINICS_GEN_ALL_ED_FT
[facility] General Internal Medicine  General Internal Medicine  [address] [id number]  Phone: [phone]  Fax:   Follow Up Time:

## 2019-06-26 NOTE — ED PROVIDER NOTE - ATTENDING CONTRIBUTION TO CARE
agree with PA note  "69yo F, Romanian speaking, interpretation w/ [employer] ID # [id number], pt w/ pmhx of HTN, CAD, GERD, warm autoimmune hemolytic anemia presents to the ED c/o abdominal pain which began last night. Pt endorses abd bloating w/ associated nausea and multiple episodes of NBNB emesis and diarrhea. Pt last ate [employer] for dinner last night, reports her  experienced diarrhea but no other symptoms."   had similar symptoms but no fever.    PE: febrile, well appearing; normotensive; CTAB/L; s1 s2 no m/r/g abd soft/NT/ND ext: no edema

## 2019-06-26 NOTE — ED PROVIDER NOTE - CLINICAL SUMMARY MEDICAL DECISION MAKING FREE TEXT BOX
A:  -Abd pain w/ N/V/D- likely viral illness- r/o intra-abdominal infection/obstruction  P:  -Labs, UA/UC, CT abd/pel w/ IV and PO contrast, IVF, anti-emetics, anti-pyretics

## 2019-06-26 NOTE — ED ADULT TRIAGE NOTE - CHIEF COMPLAINT QUOTE
Pt with c/o vomiting and diarrhea along with bone pain. Hx of cancer. Unsure of what type. Not on any current therapy. Pt febrile.

## 2019-06-26 NOTE — ED PROVIDER NOTE - PROGRESS NOTE DETAILS
Tacho: Patient feeling well. Tolerated PO challenge. Will inform of R lung findings and have her follow up with PMD.

## 2019-06-27 LAB
SPECIMEN SOURCE: SIGNIFICANT CHANGE UP
SPECIMEN SOURCE: SIGNIFICANT CHANGE UP

## 2019-06-28 LAB
BACTERIA UR CULT: SIGNIFICANT CHANGE UP
SPECIMEN SOURCE: SIGNIFICANT CHANGE UP

## 2019-07-01 LAB
BACTERIA BLD CULT: SIGNIFICANT CHANGE UP
BACTERIA BLD CULT: SIGNIFICANT CHANGE UP

## 2019-07-02 ENCOUNTER — APPOINTMENT (OUTPATIENT)
Dept: HEMATOLOGY ONCOLOGY | Facility: CLINIC | Age: 69
End: 2019-07-02
Payer: MEDICARE

## 2019-07-02 ENCOUNTER — RESULT REVIEW (OUTPATIENT)
Age: 69
End: 2019-07-02

## 2019-07-02 ENCOUNTER — OUTPATIENT (OUTPATIENT)
Dept: OUTPATIENT SERVICES | Facility: HOSPITAL | Age: 69
LOS: 1 days | Discharge: HOME | End: 2019-07-02

## 2019-07-02 VITALS
BODY MASS INDEX: 37.06 KG/M2 | RESPIRATION RATE: 16 BRPM | SYSTOLIC BLOOD PRESSURE: 167 MMHG | DIASTOLIC BLOOD PRESSURE: 77 MMHG | WEIGHT: 203.92 LBS | HEART RATE: 49 BPM | TEMPERATURE: 98.1 F | OXYGEN SATURATION: 98 %

## 2019-07-02 DIAGNOSIS — Z98.890 OTHER SPECIFIED POSTPROCEDURAL STATES: Chronic | ICD-10-CM

## 2019-07-02 DIAGNOSIS — D64.9 ANEMIA, UNSPECIFIED: ICD-10-CM

## 2019-07-02 LAB
BASOPHILS # BLD AUTO: 0.1 K/UL — SIGNIFICANT CHANGE UP (ref 0–0.2)
BASOPHILS NFR BLD AUTO: 0.8 % — SIGNIFICANT CHANGE UP (ref 0–2)
EOSINOPHIL # BLD AUTO: 1.2 K/UL — HIGH (ref 0–0.5)
EOSINOPHIL NFR BLD AUTO: 13 % — HIGH (ref 0–6)
HCT VFR BLD CALC: 35.1 % — SIGNIFICANT CHANGE UP (ref 34.5–45)
HGB BLD-MCNC: 11.4 G/DL — LOW (ref 11.5–15.5)
LYMPHOCYTES # BLD AUTO: 2.3 K/UL — SIGNIFICANT CHANGE UP (ref 1–3.3)
LYMPHOCYTES # BLD AUTO: 25.3 % — SIGNIFICANT CHANGE UP (ref 13–44)
MCHC RBC-ENTMCNC: 27.3 PG — SIGNIFICANT CHANGE UP (ref 27–34)
MCHC RBC-ENTMCNC: 32.4 G/DL — SIGNIFICANT CHANGE UP (ref 32–36)
MCV RBC AUTO: 84.5 FL — SIGNIFICANT CHANGE UP (ref 80–100)
MONOCYTES # BLD AUTO: 1.2 K/UL — HIGH (ref 0–0.9)
MONOCYTES NFR BLD AUTO: 12.8 % — SIGNIFICANT CHANGE UP (ref 2–14)
NEUTROPHILS # BLD AUTO: 4.4 K/UL — SIGNIFICANT CHANGE UP (ref 1.8–7.4)
NEUTROPHILS NFR BLD AUTO: 48.1 % — SIGNIFICANT CHANGE UP (ref 43–77)
PLATELET # BLD AUTO: 309 K/UL — SIGNIFICANT CHANGE UP (ref 150–400)
RBC # BLD: 4.16 M/UL — SIGNIFICANT CHANGE UP (ref 3.8–5.2)
RBC # FLD: 12.4 % — SIGNIFICANT CHANGE UP (ref 10.3–14.5)
RETICS #: 56.8 K/UL — SIGNIFICANT CHANGE UP (ref 25–125)
RETICS/RBC NFR: 1.4 % — SIGNIFICANT CHANGE UP (ref 0.5–2.5)
WBC # BLD: 9.2 K/UL — SIGNIFICANT CHANGE UP (ref 3.8–10.5)
WBC # FLD AUTO: 9.2 K/UL — SIGNIFICANT CHANGE UP (ref 3.8–10.5)

## 2019-07-02 PROCEDURE — 99214 OFFICE O/P EST MOD 30 MIN: CPT

## 2019-07-02 NOTE — HISTORY OF PRESENT ILLNESS
[de-identified] : 66yo F w/ HTN, CAD, GERD presented to the ED on 5/2/18 with 2 days of subjective fever and 3 months of left sided ear fullness and tinnitus. Blood work showed Hgb 6.3, further w/u consistent for hemolytic anemia - elevated retic count, elevated LDH, low hapto, indirect hyperbilirubinemia, direct Paolo positive for IgG. Negative stool occult. She was started on 90 mg prednisone on 5/3/18. CT was done which did not show any malignancy. \par \par She was discharged on prednisone 90mg. Hgb 7.0 on day of discharge, received 1u PRBCs prior to discharge. \par Her children live elsewhere around the country. She lives with her .  \par \shea Went to the ED on 5/30/18 for a vesicular rash in area of erythema on chest and back - diagnosed with zoster and started on Valtrex.  [de-identified] : She is now tapered off prednisone since Aug 2018. \par \par For the last week, feeling cold - no fevers or chills. She went to PMD yesterday, found to have blood in urine. Sent to kidney specialist\par Otherwise, doing well. \par \par She was in the ED on 6/26/19 with nausea/diarrhea. She is here b/c she has "cancer in the lung". \par CT A/P: Mild pancolitis of infectious or inflammatory etiology. Partially imaged right middle lobe ground glass opacity not seen on CT of 5/4/18. Finding can be further evaluated with nonemergent chest CT. \par

## 2019-07-02 NOTE — ASSESSMENT
[FreeTextEntry1] : 69yo F w/ warm AIHA, s/p prednisone \par Hgb 11.4 today, now tapered off steroids since Aug 2018\par f/u retic, LDH, hapto\par will order CT chest to evaluate right middle lobe ground glass opacity. \par PMD f/u\par RTC 3 mo

## 2019-07-02 NOTE — REASON FOR VISIT
[Follow-Up Visit] : a follow-up visit for [[employer] ] : provided by [employer]   [FreeTextEntry2] : ANNA [FreeTextEntry1] : [id number] [FreeTextEntry3] : Luxembour[place]

## 2019-07-05 LAB
ALBUMIN SERPL ELPH-MCNC: 3.6 G/DL
ALP BLD-CCNC: 82 U/L
ALT SERPL-CCNC: 24 U/L
ANION GAP SERPL CALC-SCNC: 10 MMOL/L
AST SERPL-CCNC: 23 U/L
BILIRUB SERPL-MCNC: 0.2 MG/DL
BUN SERPL-MCNC: 10 MG/DL
CALCIUM SERPL-MCNC: 8.9 MG/DL
CHLORIDE SERPL-SCNC: 107 MMOL/L
CO2 SERPL-SCNC: 25 MMOL/L
CREAT SERPL-MCNC: 0.67 MG/DL
GLUCOSE SERPL-MCNC: 157 MG/DL
HAPTOGLOB SERPL-MCNC: 218 MG/DL
LDH SERPL-CCNC: 182 U/L
POTASSIUM SERPL-SCNC: 3.8 MMOL/L
PROT SERPL-MCNC: 6.1 G/DL
SODIUM SERPL-SCNC: 142 MMOL/L

## 2019-07-09 ENCOUNTER — FORM ENCOUNTER (OUTPATIENT)
Age: 69
End: 2019-07-09

## 2019-07-10 ENCOUNTER — OUTPATIENT (OUTPATIENT)
Dept: OUTPATIENT SERVICES | Facility: HOSPITAL | Age: 69
LOS: 1 days | End: 2019-07-10
Payer: COMMERCIAL

## 2019-07-10 ENCOUNTER — APPOINTMENT (OUTPATIENT)
Dept: CT IMAGING | Facility: IMAGING CENTER | Age: 69
End: 2019-07-10
Payer: MEDICARE

## 2019-07-10 DIAGNOSIS — Z98.890 OTHER SPECIFIED POSTPROCEDURAL STATES: Chronic | ICD-10-CM

## 2019-07-10 DIAGNOSIS — R07.9 CHEST PAIN, UNSPECIFIED: ICD-10-CM

## 2019-07-10 PROCEDURE — 71250 CT THORAX DX C-: CPT

## 2019-07-10 PROCEDURE — 71250 CT THORAX DX C-: CPT | Mod: 26

## 2019-09-18 NOTE — ED ADULT TRIAGE NOTE - TELEPHONIC ID NUMBER OF THE INTERPRETER
[id number] [Least Pain Intensity: 0/10] : 0/10 [Maximal Pain Intensity: 0/10] : 0/10 [90: Able to carry normal activity; minor signs or symptoms of disease.] : 90: Able to carry normal activity; minor signs or symptoms of disease.  [ECOG Performance Status: 1 - Restricted in physically strenuous activity but ambulatory and able to carry out work of a light or sedentary nature] : Performance Status: 1 - Restricted in physically strenuous activity but ambulatory and able to carry out work of a light or sedentary nature, e.g., light house work, office work

## 2019-09-30 ENCOUNTER — OUTPATIENT (OUTPATIENT)
Dept: OUTPATIENT SERVICES | Facility: HOSPITAL | Age: 69
LOS: 1 days | Discharge: HOME | End: 2019-09-30

## 2019-09-30 DIAGNOSIS — Z98.890 OTHER SPECIFIED POSTPROCEDURAL STATES: Chronic | ICD-10-CM

## 2019-09-30 DIAGNOSIS — D64.9 ANEMIA, UNSPECIFIED: ICD-10-CM

## 2019-10-02 ENCOUNTER — RESULT REVIEW (OUTPATIENT)
Age: 69
End: 2019-10-02

## 2019-10-02 ENCOUNTER — APPOINTMENT (OUTPATIENT)
Dept: HEMATOLOGY ONCOLOGY | Facility: CLINIC | Age: 69
End: 2019-10-02
Payer: MEDICARE

## 2019-10-02 VITALS
RESPIRATION RATE: 16 BRPM | SYSTOLIC BLOOD PRESSURE: 186 MMHG | TEMPERATURE: 98 F | HEART RATE: 49 BPM | BODY MASS INDEX: 36.46 KG/M2 | WEIGHT: 200.62 LBS | OXYGEN SATURATION: 97 % | DIASTOLIC BLOOD PRESSURE: 67 MMHG

## 2019-10-02 LAB
BASOPHILS # BLD AUTO: 0.1 K/UL — SIGNIFICANT CHANGE UP (ref 0–0.2)
BASOPHILS NFR BLD AUTO: 0.6 % — SIGNIFICANT CHANGE UP (ref 0–2)
EOSINOPHIL # BLD AUTO: 1.2 K/UL — HIGH (ref 0–0.5)
EOSINOPHIL NFR BLD AUTO: 14.6 % — HIGH (ref 0–6)
HCT VFR BLD CALC: 36.9 % — SIGNIFICANT CHANGE UP (ref 34.5–45)
HGB BLD-MCNC: 12.4 G/DL — SIGNIFICANT CHANGE UP (ref 11.5–15.5)
LYMPHOCYTES # BLD AUTO: 1.8 K/UL — SIGNIFICANT CHANGE UP (ref 1–3.3)
LYMPHOCYTES # BLD AUTO: 22.2 % — SIGNIFICANT CHANGE UP (ref 13–44)
MCHC RBC-ENTMCNC: 28.7 PG — SIGNIFICANT CHANGE UP (ref 27–34)
MCHC RBC-ENTMCNC: 33.5 G/DL — SIGNIFICANT CHANGE UP (ref 32–36)
MCV RBC AUTO: 85.7 FL — SIGNIFICANT CHANGE UP (ref 80–100)
MONOCYTES # BLD AUTO: 0.5 K/UL — SIGNIFICANT CHANGE UP (ref 0–0.9)
MONOCYTES NFR BLD AUTO: 6.7 % — SIGNIFICANT CHANGE UP (ref 2–14)
NEUTROPHILS # BLD AUTO: 4.4 K/UL — SIGNIFICANT CHANGE UP (ref 1.8–7.4)
NEUTROPHILS NFR BLD AUTO: 55.8 % — SIGNIFICANT CHANGE UP (ref 43–77)
PLATELET # BLD AUTO: 260 K/UL — SIGNIFICANT CHANGE UP (ref 150–400)
RBC # BLD: 4.31 M/UL — SIGNIFICANT CHANGE UP (ref 3.8–5.2)
RBC # FLD: 13.5 % — SIGNIFICANT CHANGE UP (ref 10.3–14.5)
RETICS #: SIGNIFICANT CHANGE UP K/UL (ref 25–125)
RETICS/RBC NFR: SIGNIFICANT CHANGE UP % (ref 0.5–2.5)
WBC # BLD: 7.9 K/UL — SIGNIFICANT CHANGE UP (ref 3.8–10.5)
WBC # FLD AUTO: 7.9 K/UL — SIGNIFICANT CHANGE UP (ref 3.8–10.5)

## 2019-10-02 PROCEDURE — 99213 OFFICE O/P EST LOW 20 MIN: CPT

## 2019-10-02 NOTE — ASSESSMENT
[FreeTextEntry1] : 70yo F w/ warm AIHA, s/p prednisone \par Hgb 12.4 today, now tapered off steroids since Aug 2018\par f/u retic, LDH, hapto\par PMD f/u\par RTC 4 mo

## 2019-10-02 NOTE — REASON FOR VISIT
[Follow-Up Visit] : a follow-up visit for [[employer] ] : provided by [employer]   [FreeTextEntry2] : ANNA [FreeTextEntry1] : [id number] [TWNoteComboBox1] : Chinese

## 2019-10-02 NOTE — HISTORY OF PRESENT ILLNESS
[de-identified] : 66yo F w/ HTN, CAD, GERD presented to the ED on 5/2/18 with 2 days of subjective fever and 3 months of left sided ear fullness and tinnitus. Blood work showed Hgb 6.3, further w/u consistent for hemolytic anemia - elevated retic count, elevated LDH, low hapto, indirect hyperbilirubinemia, direct Paolo positive for IgG. Negative stool occult. She was started on 90 mg prednisone on 5/3/18. CT was done which did not show any malignancy. \par \par She was discharged on prednisone 90mg. Hgb 7.0 on day of discharge, received 1u PRBCs prior to discharge. \par Her children live elsewhere around the country. She lives with her .  \par \shea Went to the ED on 5/30/18 for a vesicular rash in area of erythema on chest and back - diagnosed with zoster and started on Valtrex.  [de-identified] : She is now tapered off prednisone since Aug 2018. \par \par For the last week, feeling cold - no fevers or chills. She went to PMD yesterday, found to have blood in urine. Sent to kidney specialist\par Otherwise, doing well. \par \par She was in the ED on 6/26/19 with nausea/diarrhea. She is here b/c she has "cancer in the lung". \par CT A/P: Mild pancolitis of infectious or inflammatory etiology. Partially imaged right middle lobe ground glass opacity not seen on CT of 5/4/18. Finding can be further evaluated with nonemergent chest CT. \par CT chest 7/10/19: minimal peripheral reticular opacities noted in the right middle lobe. Bilateral pulmonary nodules. \par \par She will be going to to [facility] Dec and Jan.

## 2019-10-07 LAB
ALBUMIN SERPL ELPH-MCNC: 3.8 G/DL
ALP BLD-CCNC: 114 U/L
ALT SERPL-CCNC: 14 U/L
ANION GAP SERPL CALC-SCNC: 14 MMOL/L
AST SERPL-CCNC: 17 U/L
BILIRUB SERPL-MCNC: 0.5 MG/DL
BUN SERPL-MCNC: 11 MG/DL
CALCIUM SERPL-MCNC: 9.3 MG/DL
CHLORIDE SERPL-SCNC: 103 MMOL/L
CO2 SERPL-SCNC: 26 MMOL/L
CREAT SERPL-MCNC: 0.61 MG/DL
GLUCOSE SERPL-MCNC: 144 MG/DL
HAPTOGLOB SERPL-MCNC: 185 MG/DL
LDH SERPL-CCNC: 148 U/L
POTASSIUM SERPL-SCNC: 3.6 MMOL/L
PROT SERPL-MCNC: 6.4 G/DL
SODIUM SERPL-SCNC: 142 MMOL/L

## 2020-01-13 ENCOUNTER — EMERGENCY (EMERGENCY)
Facility: HOSPITAL | Age: 70
LOS: 1 days | Discharge: HOME | End: 2020-01-13
Attending: STUDENT IN AN ORGANIZED HEALTH CARE EDUCATION/TRAINING PROGRAM | Admitting: STUDENT IN AN ORGANIZED HEALTH CARE EDUCATION/TRAINING PROGRAM
Payer: MEDICARE

## 2020-01-13 VITALS
RESPIRATION RATE: 18 BRPM | SYSTOLIC BLOOD PRESSURE: 155 MMHG | HEART RATE: 68 BPM | OXYGEN SATURATION: 100 % | DIASTOLIC BLOOD PRESSURE: 70 MMHG | TEMPERATURE: 98 F

## 2020-01-13 VITALS
TEMPERATURE: 98 F | SYSTOLIC BLOOD PRESSURE: 168 MMHG | DIASTOLIC BLOOD PRESSURE: 63 MMHG | RESPIRATION RATE: 20 BRPM | HEART RATE: 67 BPM | OXYGEN SATURATION: 99 %

## 2020-01-13 DIAGNOSIS — Z98.890 OTHER SPECIFIED POSTPROCEDURAL STATES: Chronic | ICD-10-CM

## 2020-01-13 LAB
ALBUMIN SERPL ELPH-MCNC: 4 G/DL — SIGNIFICANT CHANGE UP (ref 3.3–5)
ALP SERPL-CCNC: 122 U/L — HIGH (ref 40–120)
ALT FLD-CCNC: 13 U/L — SIGNIFICANT CHANGE UP (ref 4–33)
ANION GAP SERPL CALC-SCNC: 11 MMO/L — SIGNIFICANT CHANGE UP (ref 7–14)
AST SERPL-CCNC: 13 U/L — SIGNIFICANT CHANGE UP (ref 4–32)
BASOPHILS # BLD AUTO: 0.07 K/UL — SIGNIFICANT CHANGE UP (ref 0–0.2)
BASOPHILS NFR BLD AUTO: 0.8 % — SIGNIFICANT CHANGE UP (ref 0–2)
BILIRUB SERPL-MCNC: 0.5 MG/DL — SIGNIFICANT CHANGE UP (ref 0.2–1.2)
BUN SERPL-MCNC: 9 MG/DL — SIGNIFICANT CHANGE UP (ref 7–23)
CALCIUM SERPL-MCNC: 9.4 MG/DL — SIGNIFICANT CHANGE UP (ref 8.4–10.5)
CHLORIDE SERPL-SCNC: 103 MMOL/L — SIGNIFICANT CHANGE UP (ref 98–107)
CO2 SERPL-SCNC: 27 MMOL/L — SIGNIFICANT CHANGE UP (ref 22–31)
CREAT SERPL-MCNC: 0.64 MG/DL — SIGNIFICANT CHANGE UP (ref 0.5–1.3)
EOSINOPHIL # BLD AUTO: 1.42 K/UL — HIGH (ref 0–0.5)
EOSINOPHIL NFR BLD AUTO: 15.3 % — HIGH (ref 0–6)
GLUCOSE SERPL-MCNC: 131 MG/DL — HIGH (ref 70–99)
HCT VFR BLD CALC: 38.9 % — SIGNIFICANT CHANGE UP (ref 34.5–45)
HGB BLD-MCNC: 12.3 G/DL — SIGNIFICANT CHANGE UP (ref 11.5–15.5)
IMM GRANULOCYTES NFR BLD AUTO: 0.4 % — SIGNIFICANT CHANGE UP (ref 0–1.5)
LYMPHOCYTES # BLD AUTO: 1.74 K/UL — SIGNIFICANT CHANGE UP (ref 1–3.3)
LYMPHOCYTES # BLD AUTO: 18.7 % — SIGNIFICANT CHANGE UP (ref 13–44)
MCHC RBC-ENTMCNC: 27.2 PG — SIGNIFICANT CHANGE UP (ref 27–34)
MCHC RBC-ENTMCNC: 31.6 % — LOW (ref 32–36)
MCV RBC AUTO: 85.9 FL — SIGNIFICANT CHANGE UP (ref 80–100)
MONOCYTES # BLD AUTO: 0.51 K/UL — SIGNIFICANT CHANGE UP (ref 0–0.9)
MONOCYTES NFR BLD AUTO: 5.5 % — SIGNIFICANT CHANGE UP (ref 2–14)
NEUTROPHILS # BLD AUTO: 5.51 K/UL — SIGNIFICANT CHANGE UP (ref 1.8–7.4)
NEUTROPHILS NFR BLD AUTO: 59.3 % — SIGNIFICANT CHANGE UP (ref 43–77)
NRBC # FLD: 0 K/UL — SIGNIFICANT CHANGE UP (ref 0–0)
PLATELET # BLD AUTO: 274 K/UL — SIGNIFICANT CHANGE UP (ref 150–400)
PMV BLD: 9.6 FL — SIGNIFICANT CHANGE UP (ref 7–13)
POTASSIUM SERPL-MCNC: 3.4 MMOL/L — LOW (ref 3.5–5.3)
POTASSIUM SERPL-SCNC: 3.4 MMOL/L — LOW (ref 3.5–5.3)
PROT SERPL-MCNC: 7.2 G/DL — SIGNIFICANT CHANGE UP (ref 6–8.3)
RBC # BLD: 4.53 M/UL — SIGNIFICANT CHANGE UP (ref 3.8–5.2)
RBC # FLD: 13.7 % — SIGNIFICANT CHANGE UP (ref 10.3–14.5)
SODIUM SERPL-SCNC: 141 MMOL/L — SIGNIFICANT CHANGE UP (ref 135–145)
TROPONIN T, HIGH SENSITIVITY: 10 NG/L — SIGNIFICANT CHANGE UP (ref ?–14)
TROPONIN T, HIGH SENSITIVITY: 10 NG/L — SIGNIFICANT CHANGE UP (ref ?–14)
WBC # BLD: 9.29 K/UL — SIGNIFICANT CHANGE UP (ref 3.8–10.5)
WBC # FLD AUTO: 9.29 K/UL — SIGNIFICANT CHANGE UP (ref 3.8–10.5)

## 2020-01-13 PROCEDURE — 99284 EMERGENCY DEPT VISIT MOD MDM: CPT | Mod: 25

## 2020-01-13 PROCEDURE — 93010 ELECTROCARDIOGRAM REPORT: CPT

## 2020-01-13 PROCEDURE — 71046 X-RAY EXAM CHEST 2 VIEWS: CPT | Mod: 26

## 2020-01-13 RX ORDER — ACETAMINOPHEN 500 MG
650 TABLET ORAL ONCE
Refills: 0 | Status: COMPLETED | OUTPATIENT
Start: 2020-01-13 | End: 2020-01-13

## 2020-01-13 RX ORDER — ALBUTEROL 90 UG/1
1 AEROSOL, METERED ORAL ONCE
Refills: 0 | Status: COMPLETED | OUTPATIENT
Start: 2020-01-13 | End: 2020-01-13

## 2020-01-13 RX ADMIN — Medication 650 MILLIGRAM(S): at 08:07

## 2020-01-13 RX ADMIN — Medication 60 MILLIGRAM(S): at 08:07

## 2020-01-13 RX ADMIN — ALBUTEROL 1 PUFF(S): 90 AEROSOL, METERED ORAL at 08:07

## 2020-01-13 RX ADMIN — Medication 650 MILLIGRAM(S): at 09:15

## 2020-01-13 NOTE — ED PROVIDER NOTE - PATIENT PORTAL LINK FT
You can access the FollowMyHealth Patient Portal offered by [facility] by registering at the following website: [email or web address]. By joining [email or web address]’s FollowMyHealth portal, you will also be able to view your health information using other applications (apps) compatible with our system.

## 2020-01-13 NOTE — ED ADULT NURSE NOTE - NSIMPLEMENTINTERV_GEN_ALL_ED
Implemented All Universal Safety Interventions:  [place] to call system. Call bell, personal items and telephone within reach. Instruct patient to call for assistance. Room bathroom lighting operational. Non-slip footwear when patient is off stretcher. Physically safe environment: no spills, clutter or unnecessary equipment. Stretcher in lowest position, wheels locked, appropriate side rails in place.

## 2020-01-13 NOTE — ED PROVIDER NOTE - OBJECTIVE STATEMENT
68 yo, Belarusian speaking F ( ID [id number] Stephania), hx HTN, CAD, GERD, warm autoimmune hemolytic anemia presenting to the ED with complaints of flu like symptoms for 3 1/2 weeks after getting her flu shots with associated shortness of breath and constant chest pain. Pain is unchanged with position/movement. Endorses fevers since that time, with associated productive cough and tightness in chest that is constant. Has not seen her doctor since that time because she thought it was "just a cough." States she is not undergoing treatment for her autoimmune disorder, no steroid use at home. Was told that she has "spots" on her lung but never had follow up after that point.

## 2020-01-13 NOTE — ED PROVIDER NOTE - PROGRESS NOTE DETAILS
Beth PGY3: discussed results with patient, endorses improved symptoms, dc with recs to use albuterol every 6 hours, as needed. Follow up with pcp, discussed return recs

## 2020-01-13 NOTE — ED PROVIDER NOTE - CLINICAL SUMMARY MEDICAL DECISION MAKING FREE TEXT BOX
68 yo F presenting with cough x 3 weeks, cough/sob between words, lung sounds clear, no stridor, cp non-radiating, no pleuritic pain, no change with position. Saturating 99% on RA, mild tachypnea without accessory muscle use. EKG r/o pericarditis, trop r/o myocarditis. CXR, prednisone, albuterol and reassess. 68 yo F presenting with cough x 3 weeks, cough/sob between words, lung sounds clear, no stridor, cp non-radiating, no pleuritic pain, no change with position. Saturating 99% on RA, mild tachypnea without accessory muscle use. EKG r/o pericarditis,. CXR, prednisone, albuterol and reassess.

## 2020-01-13 NOTE — ED PROVIDER NOTE - ATTENDING CONTRIBUTION TO CARE
HODAN JohnsonD: 69F hx htn gerd cad warm hemolytic anemia p/w c/o sob, cough (productive of clear/yellow sputum) and constant substernal cp x3 weeks since shortly after getting flu shot. +subjective fevers +headache. cp is aching, constant, non-radiating, not pleuritic. no abd pain no n/v/c/d. on exam pt well appearing, coarse lung sounds b/l, heart rrr, moist mucous membranes, rest of exam agree as per resident documentation. EKG sinus bradycardia  no urbano no twi, unchanged from 5-2018. suspect pt had viral illness which has now progressed to post-viral bronchitis. no focal lung sounds to suggest pna but will check cxr. given normal ekg in the setting of 3 weeks of cp no indication to check troponin, cp likely pleurisy vs msk from constant cough. will check basic labs, treat symptomaticaly with neb and tylenol and reassess. anticipate dc with outpt pmd follow up.

## 2020-01-13 NOTE — ED ADULT TRIAGE NOTE - CHIEF COMPLAINT QUOTE
Pt. with c/o cough, chest pain and difficulty breathing starting. Pt. stated started in December but last night she was unable to sleep.

## 2020-01-13 NOTE — ED ADULT NURSE NOTE - OBJECTIVE STATEMENT
Patient received to room 1, A&Ox4, ambulatory, c/o worsening cough, SOB, and chest pain for one month. Patient states she got the flu vaccine on 12/17 and symptoms started 3 days later. Reports coughing up small amounts of phlegm. Placed on cardiac monitor, NSR. Breathing even and unlabored at this time, 100% on RA. Abdomen soft, nontender, nondistended. Pulses present in all extremities. Denies HA/dizziness/lightheadedness. MD at bedside. Awaiting further orders, will continue to monitor.

## 2020-01-13 NOTE — ED PROVIDER NOTE - NSFOLLOWUPINSTRUCTIONS_ED_ALL_ED_FT
Cough    Coughing is a reflex that clears your throat and your airways. Coughing helps to heal and protect your lungs. It is normal to cough occasionally, but a cough that happens with other symptoms or lasts a long time may be a sign of a condition that needs treatment. Coughing may be caused by infections, asthma or COPD, smoking, postnasal drip, gastroesophageal reflux, as well as other medical conditions. Take medicines only as instructed by your health care provider. Avoid environments or triggers that causes you to cough at work or at home.    SEEK IMMEDIATE MEDICAL CARE IF YOU HAVE ANY OF THE FOLLOWING SYMPTOMS: coughing up blood, shortness of breath, rapid heart rate, chest pain, unexplained weight loss or night sweats.     Use Albuterol Inhaler 2 puffs every 4 to 6 hours as needed for shortness of breath and/or wheezing.    Call to schedule follow up with your primary care doctor within 1-2 days.

## 2020-01-30 ENCOUNTER — OUTPATIENT (OUTPATIENT)
Dept: OUTPATIENT SERVICES | Facility: HOSPITAL | Age: 70
LOS: 1 days | Discharge: HOME | End: 2020-01-30

## 2020-01-30 DIAGNOSIS — D64.9 ANEMIA, UNSPECIFIED: ICD-10-CM

## 2020-01-30 DIAGNOSIS — Z98.890 OTHER SPECIFIED POSTPROCEDURAL STATES: Chronic | ICD-10-CM

## 2020-02-05 ENCOUNTER — APPOINTMENT (OUTPATIENT)
Dept: HEMATOLOGY ONCOLOGY | Facility: CLINIC | Age: 70
End: 2020-02-05
Payer: MEDICARE

## 2020-02-05 ENCOUNTER — RESULT REVIEW (OUTPATIENT)
Age: 70
End: 2020-02-05

## 2020-02-05 VITALS
TEMPERATURE: 98.5 F | OXYGEN SATURATION: 98 % | WEIGHT: 202.38 LBS | DIASTOLIC BLOOD PRESSURE: 73 MMHG | HEART RATE: 52 BPM | BODY MASS INDEX: 36.78 KG/M2 | SYSTOLIC BLOOD PRESSURE: 150 MMHG | RESPIRATION RATE: 17 BRPM

## 2020-02-05 LAB
BASOPHILS # BLD AUTO: 0.1 K/UL — SIGNIFICANT CHANGE UP (ref 0–0.2)
BASOPHILS NFR BLD AUTO: 0.7 % — SIGNIFICANT CHANGE UP (ref 0–2)
EOSINOPHIL # BLD AUTO: 1.2 K/UL — HIGH (ref 0–0.5)
EOSINOPHIL NFR BLD AUTO: 15.6 % — HIGH (ref 0–6)
HCT VFR BLD CALC: 36.2 % — SIGNIFICANT CHANGE UP (ref 34.5–45)
HGB BLD-MCNC: 12.2 G/DL — SIGNIFICANT CHANGE UP (ref 11.5–15.5)
LYMPHOCYTES # BLD AUTO: 2.2 K/UL — SIGNIFICANT CHANGE UP (ref 1–3.3)
LYMPHOCYTES # BLD AUTO: 27.7 % — SIGNIFICANT CHANGE UP (ref 13–44)
MCHC RBC-ENTMCNC: 29.1 PG — SIGNIFICANT CHANGE UP (ref 27–34)
MCHC RBC-ENTMCNC: 33.6 G/DL — SIGNIFICANT CHANGE UP (ref 32–36)
MCV RBC AUTO: 86.5 FL — SIGNIFICANT CHANGE UP (ref 80–100)
MONOCYTES # BLD AUTO: 0.6 K/UL — SIGNIFICANT CHANGE UP (ref 0–0.9)
MONOCYTES NFR BLD AUTO: 7 % — SIGNIFICANT CHANGE UP (ref 2–14)
NEUTROPHILS # BLD AUTO: 3.9 K/UL — SIGNIFICANT CHANGE UP (ref 1.8–7.4)
NEUTROPHILS NFR BLD AUTO: 49 % — SIGNIFICANT CHANGE UP (ref 43–77)
PLATELET # BLD AUTO: 247 K/UL — SIGNIFICANT CHANGE UP (ref 150–400)
RBC # BLD: 4.18 M/UL — SIGNIFICANT CHANGE UP (ref 3.8–5.2)
RBC # FLD: 12.8 % — SIGNIFICANT CHANGE UP (ref 10.3–14.5)
RETICS #: 97.8 K/UL — SIGNIFICANT CHANGE UP (ref 25–125)
RETICS/RBC NFR: 2.2 % — SIGNIFICANT CHANGE UP (ref 0.5–2.5)
WBC # BLD: 7.9 K/UL — SIGNIFICANT CHANGE UP (ref 3.8–10.5)
WBC # FLD AUTO: 7.9 K/UL — SIGNIFICANT CHANGE UP (ref 3.8–10.5)

## 2020-02-05 PROCEDURE — 99213 OFFICE O/P EST LOW 20 MIN: CPT

## 2020-02-05 RX ORDER — PREDNISONE 10 MG/1
10 TABLET ORAL DAILY
Qty: 30 | Refills: 0 | Status: DISCONTINUED | COMMUNITY
Start: 2018-06-05 | End: 2020-02-05

## 2020-02-05 NOTE — ASSESSMENT
[FreeTextEntry1] : 68yo F w/ warm AIHA, s/p prednisone \par Hgb 12.2 today, now tapered off steroids since Aug 2018. Doing well. \par f/u retic, LDH, hapto\par PMD f/u\par RTC 4 mo

## 2020-02-05 NOTE — HISTORY OF PRESENT ILLNESS
[de-identified] : 68yo F w/ HTN, CAD, GERD presented to the ED on 5/2/18 with 2 days of subjective fever and 3 months of left sided ear fullness and tinnitus. Blood work showed Hgb 6.3, further w/u consistent for hemolytic anemia - elevated retic count, elevated LDH, low hapto, indirect hyperbilirubinemia, direct Paolo positive for IgG. Negative stool occult. She was started on 90 mg prednisone on 5/3/18. CT was done which did not show any malignancy. \par \par She was discharged on prednisone 90mg. Hgb 7.0 on day of discharge, received 1u PRBCs prior to discharge. \par Her children live elsewhere around the country. She lives with her .  \par \shea Went to the ED on 5/30/18 for a vesicular rash in area of erythema on chest and back - diagnosed with zoster and started on Valtrex.  [de-identified] : She is now tapered off prednisone since Aug 2018. \par \par She was in the ED on 6/26/19 with nausea/diarrhea. She is here b/c she has "cancer in the lung". \par CT A/P: Mild pancolitis of infectious or inflammatory etiology. Partially imaged right middle lobe ground glass opacity not seen on CT of 5/4/18. Finding can be further evaluated with nonemergent chest CT. \par CT chest 7/10/19: minimal peripheral reticular opacities noted in the right middle lobe. Bilateral pulmonary nodules. \par \par She did not go to [facility] b/c her  was sick. \par She reports pains in her b/l legs with walking. Otherwise doing well.

## 2020-02-05 NOTE — REASON FOR VISIT
[Follow-Up Visit] : a follow-up visit for [[employer] ] : provided by [employer]   [FreeTextEntry1] : [id number] [FreeTextEntry2] : ANNA [TWNoteComboBox1] : [place]

## 2020-02-07 LAB
ALBUMIN SERPL ELPH-MCNC: 4 G/DL
ALP BLD-CCNC: 122 U/L
ALT SERPL-CCNC: 13 U/L
ANION GAP SERPL CALC-SCNC: 12 MMOL/L
AST SERPL-CCNC: 14 U/L
BILIRUB SERPL-MCNC: 0.4 MG/DL
BUN SERPL-MCNC: 12 MG/DL
CALCIUM SERPL-MCNC: 9.6 MG/DL
CHLORIDE SERPL-SCNC: 102 MMOL/L
CO2 SERPL-SCNC: 28 MMOL/L
CREAT SERPL-MCNC: 0.67 MG/DL
GLUCOSE SERPL-MCNC: 115 MG/DL
HAPTOGLOB SERPL-MCNC: 186 MG/DL
LDH SERPL-CCNC: 147 U/L
POTASSIUM SERPL-SCNC: 3.9 MMOL/L
PROT SERPL-MCNC: 6.8 G/DL
SODIUM SERPL-SCNC: 142 MMOL/L

## 2020-06-15 ENCOUNTER — OUTPATIENT (OUTPATIENT)
Dept: OUTPATIENT SERVICES | Facility: HOSPITAL | Age: 70
LOS: 1 days | Discharge: HOME | End: 2020-06-15

## 2020-06-15 ENCOUNTER — APPOINTMENT (OUTPATIENT)
Dept: HEMATOLOGY ONCOLOGY | Facility: CLINIC | Age: 70
End: 2020-06-15

## 2020-06-15 DIAGNOSIS — D64.9 ANEMIA, UNSPECIFIED: ICD-10-CM

## 2020-06-15 DIAGNOSIS — Z98.890 OTHER SPECIFIED POSTPROCEDURAL STATES: Chronic | ICD-10-CM

## 2020-06-17 ENCOUNTER — APPOINTMENT (OUTPATIENT)
Dept: HEMATOLOGY ONCOLOGY | Facility: CLINIC | Age: 70
End: 2020-06-17

## 2020-06-18 ENCOUNTER — APPOINTMENT (OUTPATIENT)
Dept: HEMATOLOGY ONCOLOGY | Facility: CLINIC | Age: 70
End: 2020-06-18
Payer: MEDICARE

## 2020-06-18 ENCOUNTER — RESULT REVIEW (OUTPATIENT)
Age: 70
End: 2020-06-18

## 2020-06-18 VITALS
WEIGHT: 202.83 LBS | HEART RATE: 45 BPM | TEMPERATURE: 98.1 F | OXYGEN SATURATION: 97 % | BODY MASS INDEX: 36.86 KG/M2 | RESPIRATION RATE: 15 BRPM | DIASTOLIC BLOOD PRESSURE: 66 MMHG | SYSTOLIC BLOOD PRESSURE: 124 MMHG

## 2020-06-18 LAB
BASOPHILS # BLD AUTO: 0.07 K/UL — SIGNIFICANT CHANGE UP (ref 0–0.2)
BASOPHILS NFR BLD AUTO: 1 % — SIGNIFICANT CHANGE UP (ref 0–2)
EOSINOPHIL # BLD AUTO: 1.21 K/UL — HIGH (ref 0–0.5)
EOSINOPHIL NFR BLD AUTO: 16.9 % — HIGH (ref 0–6)
HCT VFR BLD CALC: 37 % — SIGNIFICANT CHANGE UP (ref 34.5–45)
HGB BLD-MCNC: 11.6 G/DL — SIGNIFICANT CHANGE UP (ref 11.5–15.5)
IMM GRANULOCYTES NFR BLD AUTO: 0.4 % — SIGNIFICANT CHANGE UP (ref 0–1.5)
LYMPHOCYTES # BLD AUTO: 1.73 K/UL — SIGNIFICANT CHANGE UP (ref 1–3.3)
LYMPHOCYTES # BLD AUTO: 24.1 % — SIGNIFICANT CHANGE UP (ref 13–44)
MCHC RBC-ENTMCNC: 26.9 PG — LOW (ref 27–34)
MCHC RBC-ENTMCNC: 31.4 GM/DL — LOW (ref 32–36)
MCV RBC AUTO: 85.8 FL — SIGNIFICANT CHANGE UP (ref 80–100)
MONOCYTES # BLD AUTO: 0.49 K/UL — SIGNIFICANT CHANGE UP (ref 0–0.9)
MONOCYTES NFR BLD AUTO: 6.8 % — SIGNIFICANT CHANGE UP (ref 2–14)
NEUTROPHILS # BLD AUTO: 3.64 K/UL — SIGNIFICANT CHANGE UP (ref 1.8–7.4)
NEUTROPHILS NFR BLD AUTO: 50.8 % — SIGNIFICANT CHANGE UP (ref 43–77)
NRBC # BLD: 0 /100 WBCS — SIGNIFICANT CHANGE UP (ref 0–0)
PLATELET # BLD AUTO: 247 K/UL — SIGNIFICANT CHANGE UP (ref 150–400)
RBC # BLD: 4.31 M/UL — SIGNIFICANT CHANGE UP (ref 3.8–5.2)
RBC # FLD: 14.7 % — HIGH (ref 10.3–14.5)
RETICS #: 73.3 K/UL — SIGNIFICANT CHANGE UP (ref 25–125)
RETICS/RBC NFR: 1.7 % — SIGNIFICANT CHANGE UP (ref 0.5–2.5)
WBC # BLD: 7.17 K/UL — SIGNIFICANT CHANGE UP (ref 3.8–10.5)
WBC # FLD AUTO: 7.17 K/UL — SIGNIFICANT CHANGE UP (ref 3.8–10.5)

## 2020-06-18 PROCEDURE — 99213 OFFICE O/P EST LOW 20 MIN: CPT

## 2020-06-18 NOTE — REASON FOR VISIT
[Follow-Up Visit] : a follow-up visit for [[employer] ] : provided by [employer]   [FreeTextEntry2] : ANNA [FreeTextEntry1] : [id number] [TWNoteComboBox1] : Angolan

## 2020-06-18 NOTE — HISTORY OF PRESENT ILLNESS
[de-identified] : 66yo F w/ HTN, CAD, GERD presented to the ED on 5/2/18 with 2 days of subjective fever and 3 months of left sided ear fullness and tinnitus. Blood work showed Hgb 6.3, further w/u consistent for hemolytic anemia - elevated retic count, elevated LDH, low hapto, indirect hyperbilirubinemia, direct Paolo positive for IgG. Negative stool occult. She was started on 90 mg prednisone on 5/3/18. CT was done which did not show any malignancy. \par \par She was discharged on prednisone 90mg. Hgb 7.0 on day of discharge, received 1u PRBCs prior to discharge. \par Her children live elsewhere around the country. She lives with her .  \par \shea Went to the ED on 5/30/18 for a vesicular rash in area of erythema on chest and back - diagnosed with zoster and started on Valtrex.  [de-identified] : She is now tapered off prednisone since Aug 2018. \par \par She was in the ED on 6/26/19 with nausea/diarrhea. She is here b/c she has "cancer in the lung". \par CT A/P: Mild pancolitis of infectious or inflammatory etiology. Partially imaged right middle lobe ground glass opacity not seen on CT of 5/4/18. Finding can be further evaluated with nonemergent chest CT. \par CT chest 7/10/19: minimal peripheral reticular opacities noted in the right middle lobe. Bilateral pulmonary nodules. \par \par She reports pains in her b/l legs with walking, occasional swelling as well. She reports itching and pins/needles diffusely. She has noted new white spots on both arms and legs. \par Otherwise doing well.

## 2020-06-18 NOTE — PHYSICAL EXAM
[Restricted in physically strenuous activity but ambulatory and able to carry out work of a light or sedentary nature] : Status 1- Restricted in physically strenuous activity but ambulatory and able to carry out work of a light or sedentary nature, e.g., light house work, office work [Normal] : grossly intact [de-identified] : hypopigemented lesions on b/l arms and L shin

## 2020-06-18 NOTE — ASSESSMENT
[FreeTextEntry1] : 70yo F w/ warm AIHA, s/p prednisone \par Hgb 11.6 today, now tapered off steroids since Aug 2018. Doing well. \par f/u retic, LDH, hapto\par PMD f/u\par derm referral \par RTC 4 mo

## 2020-06-19 LAB
ALBUMIN SERPL ELPH-MCNC: 4 G/DL
ALP BLD-CCNC: 104 U/L
ALT SERPL-CCNC: 13 U/L
ANION GAP SERPL CALC-SCNC: 12 MMOL/L
AST SERPL-CCNC: 16 U/L
BILIRUB SERPL-MCNC: 0.6 MG/DL
BUN SERPL-MCNC: 15 MG/DL
CALCIUM SERPL-MCNC: 9.1 MG/DL
CHLORIDE SERPL-SCNC: 104 MMOL/L
CO2 SERPL-SCNC: 26 MMOL/L
CREAT SERPL-MCNC: 0.7 MG/DL
GLUCOSE SERPL-MCNC: 104 MG/DL
HAPTOGLOB SERPL-MCNC: 178 MG/DL
LDH SERPL-CCNC: 137 U/L
POTASSIUM SERPL-SCNC: 4 MMOL/L
PROT SERPL-MCNC: 6.6 G/DL
SODIUM SERPL-SCNC: 141 MMOL/L

## 2020-06-23 ENCOUNTER — APPOINTMENT (OUTPATIENT)
Dept: DERMATOLOGY | Facility: CLINIC | Age: 70
End: 2020-06-23
Payer: MEDICARE

## 2020-06-23 VITALS — TEMPERATURE: 97.1 F

## 2020-06-23 DIAGNOSIS — L30.9 DERMATITIS, UNSPECIFIED: ICD-10-CM

## 2020-06-23 DIAGNOSIS — L85.3 XEROSIS CUTIS: ICD-10-CM

## 2020-06-23 PROCEDURE — 99203 OFFICE O/P NEW LOW 30 MIN: CPT | Mod: GC

## 2020-09-02 ENCOUNTER — INPATIENT (INPATIENT)
Facility: HOSPITAL | Age: 70
LOS: 6 days | Discharge: HOME | End: 2020-09-09
Attending: INTERNAL MEDICINE | Admitting: INTERNAL MEDICINE
Payer: MEDICARE

## 2020-09-02 VITALS
RESPIRATION RATE: 16 BRPM | SYSTOLIC BLOOD PRESSURE: 198 MMHG | OXYGEN SATURATION: 98 % | HEART RATE: 49 BPM | TEMPERATURE: 98 F | DIASTOLIC BLOOD PRESSURE: 77 MMHG

## 2020-09-02 DIAGNOSIS — Z98.890 OTHER SPECIFIED POSTPROCEDURAL STATES: Chronic | ICD-10-CM

## 2020-09-02 DIAGNOSIS — R07.9 CHEST PAIN, UNSPECIFIED: ICD-10-CM

## 2020-09-02 DIAGNOSIS — Z29.9 ENCOUNTER FOR PROPHYLACTIC MEASURES, UNSPECIFIED: ICD-10-CM

## 2020-09-02 DIAGNOSIS — E78.5 HYPERLIPIDEMIA, UNSPECIFIED: ICD-10-CM

## 2020-09-02 DIAGNOSIS — I25.10 ATHEROSCLEROTIC HEART DISEASE OF NATIVE CORONARY ARTERY WITHOUT ANGINA PECTORIS: ICD-10-CM

## 2020-09-02 DIAGNOSIS — R00.1 BRADYCARDIA, UNSPECIFIED: ICD-10-CM

## 2020-09-02 DIAGNOSIS — I10 ESSENTIAL (PRIMARY) HYPERTENSION: ICD-10-CM

## 2020-09-02 LAB
ALBUMIN SERPL ELPH-MCNC: 4.1 G/DL — SIGNIFICANT CHANGE UP (ref 3.3–5)
ALP SERPL-CCNC: 107 U/L — SIGNIFICANT CHANGE UP (ref 40–120)
ALT FLD-CCNC: 12 U/L — SIGNIFICANT CHANGE UP (ref 4–33)
ANION GAP SERPL CALC-SCNC: 14 MMO/L — SIGNIFICANT CHANGE UP (ref 7–14)
APTT BLD: 33.2 SEC — SIGNIFICANT CHANGE UP (ref 27–36.3)
AST SERPL-CCNC: 16 U/L — SIGNIFICANT CHANGE UP (ref 4–32)
BASOPHILS # BLD AUTO: 0.08 K/UL — SIGNIFICANT CHANGE UP (ref 0–0.2)
BASOPHILS NFR BLD AUTO: 0.8 % — SIGNIFICANT CHANGE UP (ref 0–2)
BILIRUB SERPL-MCNC: 0.7 MG/DL — SIGNIFICANT CHANGE UP (ref 0.2–1.2)
BUN SERPL-MCNC: 15 MG/DL — SIGNIFICANT CHANGE UP (ref 7–23)
CALCIUM SERPL-MCNC: 9.5 MG/DL — SIGNIFICANT CHANGE UP (ref 8.4–10.5)
CHLORIDE SERPL-SCNC: 104 MMOL/L — SIGNIFICANT CHANGE UP (ref 98–107)
CK MB BLD-MCNC: 1.9 NG/ML — SIGNIFICANT CHANGE UP (ref 1–4.7)
CK MB BLD-MCNC: SIGNIFICANT CHANGE UP (ref 0–2.5)
CK SERPL-CCNC: 83 U/L — SIGNIFICANT CHANGE UP (ref 25–170)
CO2 SERPL-SCNC: 23 MMOL/L — SIGNIFICANT CHANGE UP (ref 22–31)
CREAT SERPL-MCNC: 0.71 MG/DL — SIGNIFICANT CHANGE UP (ref 0.5–1.3)
EOSINOPHIL # BLD AUTO: 1.34 K/UL — HIGH (ref 0–0.5)
EOSINOPHIL NFR BLD AUTO: 12.9 % — HIGH (ref 0–6)
GLUCOSE SERPL-MCNC: 101 MG/DL — HIGH (ref 70–99)
HCT VFR BLD CALC: 37.9 % — SIGNIFICANT CHANGE UP (ref 34.5–45)
HGB BLD-MCNC: 11.8 G/DL — SIGNIFICANT CHANGE UP (ref 11.5–15.5)
IMM GRANULOCYTES NFR BLD AUTO: 0.5 % — SIGNIFICANT CHANGE UP (ref 0–1.5)
INR BLD: 1 — SIGNIFICANT CHANGE UP (ref 0.88–1.16)
LYMPHOCYTES # BLD AUTO: 2.37 K/UL — SIGNIFICANT CHANGE UP (ref 1–3.3)
LYMPHOCYTES # BLD AUTO: 22.8 % — SIGNIFICANT CHANGE UP (ref 13–44)
MCHC RBC-ENTMCNC: 26.5 PG — LOW (ref 27–34)
MCHC RBC-ENTMCNC: 31.1 % — LOW (ref 32–36)
MCV RBC AUTO: 85.2 FL — SIGNIFICANT CHANGE UP (ref 80–100)
MONOCYTES # BLD AUTO: 0.75 K/UL — SIGNIFICANT CHANGE UP (ref 0–0.9)
MONOCYTES NFR BLD AUTO: 7.2 % — SIGNIFICANT CHANGE UP (ref 2–14)
NEUTROPHILS # BLD AUTO: 5.82 K/UL — SIGNIFICANT CHANGE UP (ref 1.8–7.4)
NEUTROPHILS NFR BLD AUTO: 55.8 % — SIGNIFICANT CHANGE UP (ref 43–77)
NRBC # FLD: 0 K/UL — SIGNIFICANT CHANGE UP (ref 0–0)
PLATELET # BLD AUTO: 278 K/UL — SIGNIFICANT CHANGE UP (ref 150–400)
PMV BLD: 9.6 FL — SIGNIFICANT CHANGE UP (ref 7–13)
POTASSIUM SERPL-MCNC: 3.9 MMOL/L — SIGNIFICANT CHANGE UP (ref 3.5–5.3)
POTASSIUM SERPL-SCNC: 3.9 MMOL/L — SIGNIFICANT CHANGE UP (ref 3.5–5.3)
PROT SERPL-MCNC: 7.4 G/DL — SIGNIFICANT CHANGE UP (ref 6–8.3)
PROTHROM AB SERPL-ACNC: 11.5 SEC — SIGNIFICANT CHANGE UP (ref 10.6–13.6)
RBC # BLD: 4.45 M/UL — SIGNIFICANT CHANGE UP (ref 3.8–5.2)
RBC # FLD: 13.8 % — SIGNIFICANT CHANGE UP (ref 10.3–14.5)
SODIUM SERPL-SCNC: 141 MMOL/L — SIGNIFICANT CHANGE UP (ref 135–145)
TROPONIN T, HIGH SENSITIVITY: 16 NG/L — SIGNIFICANT CHANGE UP (ref ?–14)
WBC # BLD: 10.41 K/UL — SIGNIFICANT CHANGE UP (ref 3.8–10.5)
WBC # FLD AUTO: 10.41 K/UL — SIGNIFICANT CHANGE UP (ref 3.8–10.5)

## 2020-09-02 PROCEDURE — 71045 X-RAY EXAM CHEST 1 VIEW: CPT | Mod: 26

## 2020-09-02 PROCEDURE — 99284 EMERGENCY DEPT VISIT MOD MDM: CPT

## 2020-09-02 RX ORDER — HEPARIN SODIUM 5000 [USP'U]/ML
INJECTION INTRAVENOUS; SUBCUTANEOUS
Qty: 25000 | Refills: 0 | Status: DISCONTINUED | OUTPATIENT
Start: 2020-09-02 | End: 2020-09-03

## 2020-09-02 RX ORDER — HEPARIN SODIUM 5000 [USP'U]/ML
5000 INJECTION INTRAVENOUS; SUBCUTANEOUS ONCE
Refills: 0 | Status: COMPLETED | OUTPATIENT
Start: 2020-09-02 | End: 2020-09-02

## 2020-09-02 RX ORDER — HYDRALAZINE HCL 50 MG
75 TABLET ORAL THREE TIMES A DAY
Refills: 0 | Status: DISCONTINUED | OUTPATIENT
Start: 2020-09-02 | End: 2020-09-03

## 2020-09-02 RX ORDER — HEPARIN SODIUM 5000 [USP'U]/ML
5600 INJECTION INTRAVENOUS; SUBCUTANEOUS EVERY 6 HOURS
Refills: 0 | Status: DISCONTINUED | OUTPATIENT
Start: 2020-09-02 | End: 2020-09-03

## 2020-09-02 RX ORDER — HEPARIN SODIUM 5000 [USP'U]/ML
INJECTION INTRAVENOUS; SUBCUTANEOUS
Qty: 25000 | Refills: 0 | Status: DISCONTINUED | OUTPATIENT
Start: 2020-09-02 | End: 2020-09-02

## 2020-09-02 RX ORDER — ASPIRIN/CALCIUM CARB/MAGNESIUM 324 MG
324 TABLET ORAL ONCE
Refills: 0 | Status: COMPLETED | OUTPATIENT
Start: 2020-09-02 | End: 2020-09-02

## 2020-09-02 RX ORDER — LOSARTAN POTASSIUM 100 MG/1
1 TABLET, FILM COATED ORAL
Qty: 0 | Refills: 0 | DISCHARGE

## 2020-09-02 RX ORDER — ASPIRIN/CALCIUM CARB/MAGNESIUM 324 MG
81 TABLET ORAL DAILY
Refills: 0 | Status: DISCONTINUED | OUTPATIENT
Start: 2020-09-02 | End: 2020-09-06

## 2020-09-02 RX ORDER — HEPARIN SODIUM 5000 [USP'U]/ML
4000 INJECTION INTRAVENOUS; SUBCUTANEOUS EVERY 6 HOURS
Refills: 0 | Status: DISCONTINUED | OUTPATIENT
Start: 2020-09-02 | End: 2020-09-02

## 2020-09-02 RX ORDER — FENOFIBRIC ACID 105 MG/1
1 TABLET ORAL
Qty: 0 | Refills: 0 | DISCHARGE

## 2020-09-02 RX ORDER — HYDRALAZINE HCL 50 MG
10 TABLET ORAL ONCE
Refills: 0 | Status: COMPLETED | OUTPATIENT
Start: 2020-09-02 | End: 2020-09-02

## 2020-09-02 RX ORDER — CANGRELOR 50 MG/1
4 INJECTION, POWDER, LYOPHILIZED, FOR SOLUTION INTRAVENOUS
Qty: 50 | Refills: 0 | Status: DISCONTINUED | OUTPATIENT
Start: 2020-09-02 | End: 2020-09-03

## 2020-09-02 RX ORDER — CARVEDILOL PHOSPHATE 80 MG/1
1 CAPSULE, EXTENDED RELEASE ORAL
Qty: 0 | Refills: 0 | DISCHARGE

## 2020-09-02 RX ORDER — HEPARIN SODIUM 5000 [USP'U]/ML
4000 INJECTION INTRAVENOUS; SUBCUTANEOUS ONCE
Refills: 0 | Status: DISCONTINUED | OUTPATIENT
Start: 2020-09-02 | End: 2020-09-02

## 2020-09-02 RX ORDER — CHLORHEXIDINE GLUCONATE 213 G/1000ML
1 SOLUTION TOPICAL
Refills: 0 | Status: DISCONTINUED | OUTPATIENT
Start: 2020-09-02 | End: 2020-09-07

## 2020-09-02 RX ORDER — NITROGLYCERIN 6.5 MG
10 CAPSULE, EXTENDED RELEASE ORAL
Qty: 50 | Refills: 0 | Status: DISCONTINUED | OUTPATIENT
Start: 2020-09-02 | End: 2020-09-04

## 2020-09-02 RX ADMIN — Medication 10 MILLIGRAM(S): at 21:55

## 2020-09-02 RX ADMIN — HEPARIN SODIUM 1000 UNIT(S)/HR: 5000 INJECTION INTRAVENOUS; SUBCUTANEOUS at 22:35

## 2020-09-02 RX ADMIN — HEPARIN SODIUM 5000 UNIT(S): 5000 INJECTION INTRAVENOUS; SUBCUTANEOUS at 22:34

## 2020-09-02 RX ADMIN — CANGRELOR 109 MICROGRAM(S)/KG/MIN: 50 INJECTION, POWDER, LYOPHILIZED, FOR SOLUTION INTRAVENOUS at 23:10

## 2020-09-02 RX ADMIN — Medication 324 MILLIGRAM(S): at 21:59

## 2020-09-02 RX ADMIN — Medication 75 MILLIGRAM(S): at 23:37

## 2020-09-02 NOTE — PATIENT PROFILE ADULT - FUNCTIONAL SCREEN CURRENT LEVEL: SWALLOWING (IF SCORE 2 OR MORE FOR ANY ITEM, CONSULT REHAB SERVICES), MLM)
0 = swallows foods/liquids without difficulty

## 2020-09-02 NOTE — H&P ADULT - NSICDXPASTMEDICALHX_GEN_ALL_CORE_FT
PAST MEDICAL HISTORY:  CAD (coronary artery disease)     HTN (Hypertension)     LBBB (left bundle branch block)     Mixed hyperlipidemia     Sinus bradycardia     Vertigo

## 2020-09-02 NOTE — ED PROVIDER NOTE - PHYSICAL EXAMINATION
vital signs: T(C): 36.9 (09-02-20 @ 20:52), Max: 36.9 (09-02-20 @ 20:52)  HR: 49 (09-02-20 @ 20:52) (49 - 49)  BP: 198/77 (09-02-20 @ 20:52) (198/77 - 198/77)  BP(mean): --  RR: 16 (09-02-20 @ 20:52) (16 - 16)  SpO2: 98% (09-02-20 @ 20:52) (98% - 98%)  GEN - NAD; well appearing; A+O x3   HEAD - NC/AT   EYES- PERRL, EOMI  ENT: Airway patent, mmm, Oral cavity and pharynx normal. No inflammation, swelling, exudate, or lesions.  NECK: Neck supple, non-tender without lymphadenopathy, no masses.  PULMONARY - CTA b/l, symmetric breath sounds.   CARDIAC -s1s2, radha RR, no M,G,R  ABDOMEN - +BS, ND, NT, soft, no guarding, no rebound, no masses   BACK - no CVA tenderness, Normal  spine   EXTREMITIES - FROM, symmetric pulses, capillary refill < 2 seconds, no edema   SKIN - no rash or bruising   NEUROLOGIC - alert, speech clear, no focal deficits  PSYCH -nl mood/affect, nl insight. vital signs: T(C): 36.9 (09-02-20 @ 20:52), Max: 36.9 (09-02-20 @ 20:52)  HR: 49 (09-02-20 @ 20:52) (49 - 49)  BP: 198/77 (09-02-20 @ 20:52) (198/77 - 198/77)  BP(mean): --  RR: 16 (09-02-20 @ 20:52) (16 - 16)  SpO2: 98% (09-02-20 @ 20:52) (98% - 98%)  GEN - NAD; well appearing; A+O x3   HEAD - NC/AT   EYES- PERRL, EOMI  ENT: Airway patent, mmm, Oral cavity and pharynx normal. No inflammation, swelling, exudate, or lesions.    NECK: Neck supple, non-tender without lymphadenopathy, no masses.  PULMONARY - CTA b/l, symmetric breath sounds.   CARDIAC -s1s2, radha RR, no M,G,R  ABDOMEN - +BS, ND, NT, soft, no guarding, no rebound, no masses   BACK - no CVA tenderness, Normal  spine   EXTREMITIES - FROM, symmetric pulses, capillary refill < 2 seconds, no edema   SKIN - no rash or bruising   NEUROLOGIC - alert, speech clear, no focal deficits  PSYCH -nl mood/affect, nl insight.

## 2020-09-02 NOTE — H&P ADULT - PROBLEM SELECTOR PLAN 1
EKG with LBBB and ST elevation suspicious for pLAD lesion will use kengreal gtt for now  Admit to CCU  Load with ASA and Kengreal infusion and start heparin gtt as per ACS protocol  Assess for resolution of chest pain and recurrence in chest pain. Serial EKG PRN chest pain to assess for ST changes  Continuous cardiac monitoring to monitor for arrhythmias  CBC, CMP, coags, HbA1C, TSH, lipids for comorbidities, Trend cardiac enzymes  Aspirin 81 PO daily, (kengreal gtt, will trend cardiac enzymes) Lipitor 80 mg PO daily  NPO MN for cardiac cath in am. If persistent chest pain with EKG changes, will plan for emergent cath   Severe HTN management  Hold AV blockers for bradycardia  Hold ACE for now in anticipation of possible cath.   Low fat DASH diet  ECHO: TTE in am

## 2020-09-02 NOTE — ED ADULT NURSE NOTE - OBJECTIVE STATEMENT
Facilitator RN - Pt. received in room 10, Afghan speaking,  #[id number] used with MD Sepulveda at bedside. Pt. awake & alert c/o intermittent midsternal chest pain that radiates to right side of chest, began 5 days ago, denies pain at present. Pt. reports the pain lasts for a few minutes and resolves on its' own. Denies fever, chills, n/v/d, SOB, cough, dysuria, LE edema, dizziness, weakness, back pain. Pt. also reports she had monitor placed 3 days ago. Pt. bradycardic and hypertensive at present, states she is compliant with her medication. Denies headache and vision changes. MD Sepulveda at bedside and aware of pt. vital signs. Pt. placed on cardiac monitor. Primary RN at bedside for IV placement, 18G IV in right ac and 18G in left ac, positive blood return, flushes without difficulty. Pt. placed on zoll. Awaiting further orders. Report given to primary RN Adolph OBANDO

## 2020-09-02 NOTE — H&P ADULT - ASSESSMENT
70y female history of CAD, HTN, HLD, LBBB, sinus bradycardia, pre-DM presents to the ED with severe HTN and chest pain. EKG w/ LBBB and ST changes.

## 2020-09-02 NOTE — ED PROVIDER NOTE - NS ED ROS FT
ROS:  GENERAL: No fever, no chills  EYES: no change in vision  HEENT: no trouble swallowing, no trouble speaking  CARDIAC: + chest pain  PULMONARY: no cough, no shortness of breath  GI: no abdominal pain, no nausea, no vomiting, no diarrhea, no constipation  : No dysuria, no frequency, no change in appearance, or odor of urine  SKIN: no rashes  NEURO: no headache, no weakness  MSK: No joint pain

## 2020-09-02 NOTE — ED PROVIDER NOTE - CLINICAL SUMMARY MEDICAL DECISION MAKING FREE TEXT BOX
Patient presents to the ED with intermittent episodes of chest pain x 5 days. No other symptoms. had monitor placed for problem with heart which she doesn't know what it was. Had cp on ed arrival but none at this time. ED triage ekg viewed by attending dr. bernardo, activated stemi process-ekg viewed by dr. rosales who recommends no cath at this time as suspects likely lbbb however recommending asa, plavix, heparin. Labs being drawn, eval for elec disturbance, organ dysfunction, acs, get cxr, admit to ccu.

## 2020-09-02 NOTE — H&P ADULT - ATTENDING COMMENTS
70 year old  female with progressive chest pain for last 5 days worsening to the point today requiring visit to the ED. BP noted to be in 240s systolic. EKG revealed sinus radha with new LBBB which was new from Jan 2020 EKG, as well as prior EKG from 2018. However, she did have a LBBB on an EKG done in 2018 and also in 2017-which were identified subsequently. EKG does have about 0.5-1mm ST elevations in aVR and ST deviations in V1 and V6 (not meeting sgarbossa criteria for STEMI). hsTrop 16 with neg CKMB. However she does report recurrent chest pain. Differential includes ACS with possibility of left mainstem CAD vs hypertensive urgency/emergency.    Plan:  1. Repeat hsTrop in 2 hrs-if flat or trends down, likely current presentation from HTN urgency-and medicines specific for ACS may be DC-ed.  2. Patient was started on GDMT for ACS-including ASA 81 mg/d, IV heparin and cangrelor GTT noting possibility of surgical disease in this pt.IV heparin and cangrelor may be DC-ed in that case.  3. High intensity statins and PO BB starting tomorrow.  4. IV nitrodrip to lower BP for now-if pt r/o for ACS, can consider IV hydralazine/labetalol/ CCB with simultaneous initiation of PO anti-htn meds. Avoid rapid reduction in BP.  5. If ACS R/O-pt may be transferred to telemetry out of the CCU-ischemia work-up as in-patient vs out-patient depending on symtoms.    Thanks for the opportunity of participating in the care of this pt.    Avery Rosado MD  Interventional Cardiologist

## 2020-09-02 NOTE — H&P ADULT - PROBLEM SELECTOR PLAN 2
# HTN urgency.  Patients BP in ER was 240/76   Monitor blood pressure frequently.  Start Nitro gtt. Titrate for goal SBP ~ 180, Gradual decrease BP, goal is to reduce BP by 20% in first hour then a additional 5-15% in next 23 hours.   Hydralazine 75mg TID  Electrolyte, blood urea nitrogen (BUN) and creatinine levels to evaluate for renal impairment.  Urinalysis to detect hematuria or proteinuria  Serial cardiac enzymes to rule out acute coronary syndrome.  CXR to r/o pulmonary edema/dissection.  Educate importance of medication adherence, weight loss, and reduced dietary salt

## 2020-09-02 NOTE — H&P ADULT - NSICDXFAMILYHX_GEN_ALL_CORE_FT
FAMILY HISTORY:  Father  Still living? Unknown  Family history of diabetes mellitus in father, Age at diagnosis: Age Unknown  Family history of hypertension in father, Age at diagnosis: Age Unknown  Family history of ischemic heart disease, Age at diagnosis: Age Unknown    Sibling  Still living? Unknown  Family history of breast cancer, Age at diagnosis: Age Unknown  Family history of malignant neoplasm of uterus, Age at diagnosis: Age Unknown

## 2020-09-02 NOTE — ED ADULT TRIAGE NOTE - CHIEF COMPLAINT QUOTE
Pt arrives to ED wearing a halter monitor reporting her doctor said it is not working properly and should come to the hospital if she felt pain.  Pt reports intermittent pain to center of chest with radiation to right side of her back.  Pt states she has felt pain for 4 days but did not come in because it wasn't consistent.  Pt reports history of chest pain but no surgeries.  ekg in triage.

## 2020-09-02 NOTE — ED PROVIDER NOTE - OBJECTIVE STATEMENT
id [id number]  69 y/o f presents to the ED with chest pain. Started 5 days ago, intermittent, random in onset, does not have the pain right now, center of chest radiating to r. side of chest, lasts few minutes at a time.  Denies fevers, chills, nausea, vomiting, shortness of breath, cough, diarrhea, dysuria, le edema. States she had a monitor placed on her heart 3 d ago. Does not know the name of the doctor that did it.  Hydralazine 50mg  Carvedilol 25mg  candesartan 32mg

## 2020-09-02 NOTE — ED PROVIDER NOTE - PROGRESS NOTE DETAILS
hydralazine 10mg given, rpt sbp 191, rpt again in few mins, if remains elevated above 180 will start nitro drip, admit to dr. dillon. graeme requested by cardiology attg given concern for left main dx and need for possible ct surg down the line. informed by nurse bp still elevated, went to see patient and gave verbal order to pull nitro and start-patient taken to ccu already, called ccu np and informed her of last bp-she will order nitro after they repeat bp up there if still elevated >180.

## 2020-09-02 NOTE — ED ADULT TRIAGE NOTE - LANGUAGE ASSISTANCE NEEDED
Yes-Patient/Caregiver accepts free interpretation services.../Family translating in triage with pt permission.

## 2020-09-02 NOTE — H&P ADULT - HISTORY OF PRESENT ILLNESS
70y female history of CAD, HTN, HLD, LBBB, sinus bradycardia, pre-DM presents to the ED with ongoing chest pain for 5 days. Chest pain described as midsternal sharp and intermittent.  Worsens with activity and better at rest with associated symptoms of SOB with activity.  Denies any radiation, diaphoresis, cough, fevers, dizziness, syncope, abd pain, back pain, N/V/D, recent sick contact, recent travel. Of note patient saw her outpatient cardiologist 3 days ago for the chest pain and was put on a holter monitor.  In the ED found to have severe HTN 's with chest pain and EKG revealing LBBB and some ST changes.  Emergent cath called however does not meet sgarbossa criteria. Admit to CCU for management of severe HTN and chest pain.    Last echo 2016 normal.   Last stress test 2017 normal  Last EKG sinus bradycardia with LBBB in 2017 70y female history of CAD, HTN, HLD, LBBB, sinus bradycardia, pre-DM presents to the ED with ongoing chest pain for 5 days. Chest pain described as midsternal sharp and intermittent.  Worsens with activity and better at rest with associated symptoms of SOB with activity.  Denies any radiation, diaphoresis, cough, fevers, dizziness, syncope, abd pain, back pain, N/V/D, recent sick contact, recent travel. Of note patient saw her outpatient cardiologist 3 days ago for the chest pain and was put on a holter monitor.  In the ED found to have severe HTN 's, bradycardia HR 40's with chest pain and EKG revealing LBBB and some ST changes (MARYLU V1-V3).  Emergent cath called however does not meet sgarbossa criteria. Admit to CCU for management of severe HTN and chest pain.    Last echo 2016 normal.   Last stress test 2017 normal  Last EKG sinus bradycardia with LBBB in 2017 70y female history of CAD, HTN, HLD, LBBB, sinus bradycardia, pre-DM presents to the ED with ongoing chest pain for 5 days. Chest pain described as midsternal sharp and intermittent.  Worsens with activity and better at rest with associated symptoms of SOB with activity.  Denies any radiation, diaphoresis, cough, fevers, dizziness, syncope, abd pain, back pain, N/V/D, recent sick contact, recent travel. Of note patient saw her outpatient cardiologist 3 days ago for the chest pain and was put on a holter monitor.  In the ED found to have severe HTN 's, bradycardia HR 40's with chest pain and EKG revealing LBBB and some ST changes (MARYLU V1-V3).  Emergent cath called however does not meet sgarbossa criteria. Admit to CCU for management of severe HTN and chest pain.    Last echo 2016 normal.   Last stress test 2017 normal  Last EKG sinus rhythm without LBBB in Jan 2020

## 2020-09-03 LAB
ALBUMIN SERPL ELPH-MCNC: 3.6 G/DL — SIGNIFICANT CHANGE UP (ref 3.3–5)
ALP SERPL-CCNC: 93 U/L — SIGNIFICANT CHANGE UP (ref 40–120)
ALT FLD-CCNC: 10 U/L — SIGNIFICANT CHANGE UP (ref 4–33)
ANION GAP SERPL CALC-SCNC: 12 MMO/L — SIGNIFICANT CHANGE UP (ref 7–14)
APPEARANCE UR: CLEAR — SIGNIFICANT CHANGE UP
APTT BLD: 32 SEC — SIGNIFICANT CHANGE UP (ref 27–36.3)
AST SERPL-CCNC: 12 U/L — SIGNIFICANT CHANGE UP (ref 4–32)
BILIRUB SERPL-MCNC: 0.8 MG/DL — SIGNIFICANT CHANGE UP (ref 0.2–1.2)
BILIRUB UR-MCNC: NEGATIVE — SIGNIFICANT CHANGE UP
BLOOD UR QL VISUAL: NEGATIVE — SIGNIFICANT CHANGE UP
BUN SERPL-MCNC: 13 MG/DL — SIGNIFICANT CHANGE UP (ref 7–23)
CALCIUM SERPL-MCNC: 9.1 MG/DL — SIGNIFICANT CHANGE UP (ref 8.4–10.5)
CHLORIDE SERPL-SCNC: 105 MMOL/L — SIGNIFICANT CHANGE UP (ref 98–107)
CHOLEST SERPL-MCNC: 89 MG/DL — LOW (ref 120–199)
CK MB BLD-MCNC: 1.8 NG/ML — SIGNIFICANT CHANGE UP (ref 1–4.7)
CK MB BLD-MCNC: 1.82 NG/ML — SIGNIFICANT CHANGE UP (ref 1–4.7)
CK MB BLD-MCNC: SIGNIFICANT CHANGE UP (ref 0–2.5)
CK MB BLD-MCNC: SIGNIFICANT CHANGE UP (ref 0–2.5)
CK SERPL-CCNC: 55 U/L — SIGNIFICANT CHANGE UP (ref 25–170)
CK SERPL-CCNC: 63 U/L — SIGNIFICANT CHANGE UP (ref 25–170)
CO2 SERPL-SCNC: 24 MMOL/L — SIGNIFICANT CHANGE UP (ref 22–31)
COLOR SPEC: COLORLESS — SIGNIFICANT CHANGE UP
CREAT SERPL-MCNC: 0.53 MG/DL — SIGNIFICANT CHANGE UP (ref 0.5–1.3)
D DIMER BLD IA.RAPID-MCNC: < 150 NG/ML — SIGNIFICANT CHANGE UP
GLUCOSE SERPL-MCNC: 103 MG/DL — HIGH (ref 70–99)
GLUCOSE UR-MCNC: NEGATIVE — SIGNIFICANT CHANGE UP
HCT VFR BLD CALC: 35.5 % — SIGNIFICANT CHANGE UP (ref 34.5–45)
HCV AB S/CO SERPL IA: 0.23 S/CO — SIGNIFICANT CHANGE UP (ref 0–0.99)
HCV AB SERPL-IMP: SIGNIFICANT CHANGE UP
HDLC SERPL-MCNC: 36 MG/DL — LOW (ref 45–65)
HGB BLD-MCNC: 11.2 G/DL — LOW (ref 11.5–15.5)
KETONES UR-MCNC: NEGATIVE — SIGNIFICANT CHANGE UP
LEUKOCYTE ESTERASE UR-ACNC: NEGATIVE — SIGNIFICANT CHANGE UP
LIPID PNL WITH DIRECT LDL SERPL: 49 MG/DL — SIGNIFICANT CHANGE UP
MAGNESIUM SERPL-MCNC: 2.3 MG/DL — SIGNIFICANT CHANGE UP (ref 1.6–2.6)
MCHC RBC-ENTMCNC: 26.9 PG — LOW (ref 27–34)
MCHC RBC-ENTMCNC: 31.5 % — LOW (ref 32–36)
MCV RBC AUTO: 85.1 FL — SIGNIFICANT CHANGE UP (ref 80–100)
NITRITE UR-MCNC: NEGATIVE — SIGNIFICANT CHANGE UP
NRBC # FLD: 0 K/UL — SIGNIFICANT CHANGE UP (ref 0–0)
NT-PROBNP SERPL-SCNC: 1013 PG/ML — SIGNIFICANT CHANGE UP
PH UR: 7.5 — SIGNIFICANT CHANGE UP (ref 5–8)
PHOSPHATE SERPL-MCNC: 3.1 MG/DL — SIGNIFICANT CHANGE UP (ref 2.5–4.5)
PLATELET # BLD AUTO: 254 K/UL — SIGNIFICANT CHANGE UP (ref 150–400)
PMV BLD: 9.6 FL — SIGNIFICANT CHANGE UP (ref 7–13)
POTASSIUM SERPL-MCNC: 3.4 MMOL/L — LOW (ref 3.5–5.3)
POTASSIUM SERPL-SCNC: 3.4 MMOL/L — LOW (ref 3.5–5.3)
PROT SERPL-MCNC: 6.7 G/DL — SIGNIFICANT CHANGE UP (ref 6–8.3)
PROT UR-MCNC: NEGATIVE — SIGNIFICANT CHANGE UP
RBC # BLD: 4.17 M/UL — SIGNIFICANT CHANGE UP (ref 3.8–5.2)
RBC # FLD: 13.7 % — SIGNIFICANT CHANGE UP (ref 10.3–14.5)
RBC CASTS # UR COMP ASSIST: SIGNIFICANT CHANGE UP (ref 0–?)
SARS-COV-2 RNA SPEC QL NAA+PROBE: SIGNIFICANT CHANGE UP
SODIUM SERPL-SCNC: 141 MMOL/L — SIGNIFICANT CHANGE UP (ref 135–145)
SP GR SPEC: 1.01 — SIGNIFICANT CHANGE UP (ref 1–1.04)
TRIGL SERPL-MCNC: 99 MG/DL — SIGNIFICANT CHANGE UP (ref 10–149)
TROPONIN T, HIGH SENSITIVITY: 15 NG/L — SIGNIFICANT CHANGE UP (ref ?–14)
TROPONIN T, HIGH SENSITIVITY: 16 NG/L — SIGNIFICANT CHANGE UP (ref ?–14)
TSH SERPL-MCNC: 5.06 UIU/ML — HIGH (ref 0.27–4.2)
UROBILINOGEN FLD QL: NORMAL — SIGNIFICANT CHANGE UP
WBC # BLD: 8.02 K/UL — SIGNIFICANT CHANGE UP (ref 3.8–10.5)
WBC # FLD AUTO: 8.02 K/UL — SIGNIFICANT CHANGE UP (ref 3.8–10.5)
WBC UR QL: SIGNIFICANT CHANGE UP (ref 0–?)

## 2020-09-03 PROCEDURE — 99291 CRITICAL CARE FIRST HOUR: CPT

## 2020-09-03 PROCEDURE — 93306 TTE W/DOPPLER COMPLETE: CPT | Mod: 26

## 2020-09-03 RX ORDER — ACETAMINOPHEN 500 MG
650 TABLET ORAL EVERY 6 HOURS
Refills: 0 | Status: DISCONTINUED | OUTPATIENT
Start: 2020-09-03 | End: 2020-09-09

## 2020-09-03 RX ORDER — HYDRALAZINE HCL 50 MG
10 TABLET ORAL THREE TIMES A DAY
Refills: 0 | Status: DISCONTINUED | OUTPATIENT
Start: 2020-09-03 | End: 2020-09-03

## 2020-09-03 RX ORDER — POTASSIUM CHLORIDE 20 MEQ
40 PACKET (EA) ORAL ONCE
Refills: 0 | Status: COMPLETED | OUTPATIENT
Start: 2020-09-03 | End: 2020-09-03

## 2020-09-03 RX ORDER — LOSARTAN POTASSIUM 100 MG/1
25 TABLET, FILM COATED ORAL DAILY
Refills: 0 | Status: DISCONTINUED | OUTPATIENT
Start: 2020-09-03 | End: 2020-09-03

## 2020-09-03 RX ORDER — HYDRALAZINE HCL 50 MG
75 TABLET ORAL THREE TIMES A DAY
Refills: 0 | Status: DISCONTINUED | OUTPATIENT
Start: 2020-09-03 | End: 2020-09-03

## 2020-09-03 RX ORDER — ATORVASTATIN CALCIUM 80 MG/1
80 TABLET, FILM COATED ORAL AT BEDTIME
Refills: 0 | Status: DISCONTINUED | OUTPATIENT
Start: 2020-09-03 | End: 2020-09-04

## 2020-09-03 RX ORDER — HYDRALAZINE HCL 50 MG
10 TABLET ORAL ONCE
Refills: 0 | Status: COMPLETED | OUTPATIENT
Start: 2020-09-03 | End: 2020-09-03

## 2020-09-03 RX ORDER — FUROSEMIDE 40 MG
20 TABLET ORAL DAILY
Refills: 0 | Status: DISCONTINUED | OUTPATIENT
Start: 2020-09-03 | End: 2020-09-04

## 2020-09-03 RX ORDER — LANOLIN ALCOHOL/MO/W.PET/CERES
6 CREAM (GRAM) TOPICAL AT BEDTIME
Refills: 0 | Status: DISCONTINUED | OUTPATIENT
Start: 2020-09-03 | End: 2020-09-03

## 2020-09-03 RX ORDER — INFLUENZA VIRUS VACCINE 15; 15; 15; 15 UG/.5ML; UG/.5ML; UG/.5ML; UG/.5ML
0.5 SUSPENSION INTRAMUSCULAR ONCE
Refills: 0 | Status: DISCONTINUED | OUTPATIENT
Start: 2020-09-03 | End: 2020-09-05

## 2020-09-03 RX ORDER — AMLODIPINE BESYLATE 2.5 MG/1
10 TABLET ORAL DAILY
Refills: 0 | Status: DISCONTINUED | OUTPATIENT
Start: 2020-09-04 | End: 2020-09-09

## 2020-09-03 RX ORDER — HYDRALAZINE HCL 50 MG
100 TABLET ORAL THREE TIMES A DAY
Refills: 0 | Status: DISCONTINUED | OUTPATIENT
Start: 2020-09-03 | End: 2020-09-03

## 2020-09-03 RX ORDER — HEPARIN SODIUM 5000 [USP'U]/ML
5000 INJECTION INTRAVENOUS; SUBCUTANEOUS EVERY 12 HOURS
Refills: 0 | Status: DISCONTINUED | OUTPATIENT
Start: 2020-09-03 | End: 2020-09-09

## 2020-09-03 RX ORDER — LOSARTAN POTASSIUM 100 MG/1
50 TABLET, FILM COATED ORAL DAILY
Refills: 0 | Status: DISCONTINUED | OUTPATIENT
Start: 2020-09-04 | End: 2020-09-05

## 2020-09-03 RX ORDER — FUROSEMIDE 40 MG
20 TABLET ORAL DAILY
Refills: 0 | Status: DISCONTINUED | OUTPATIENT
Start: 2020-09-03 | End: 2020-09-03

## 2020-09-03 RX ORDER — HEPARIN SODIUM 5000 [USP'U]/ML
1000 INJECTION INTRAVENOUS; SUBCUTANEOUS
Qty: 25000 | Refills: 0 | Status: DISCONTINUED | OUTPATIENT
Start: 2020-09-03 | End: 2020-09-03

## 2020-09-03 RX ORDER — LANOLIN ALCOHOL/MO/W.PET/CERES
3 CREAM (GRAM) TOPICAL AT BEDTIME
Refills: 0 | Status: DISCONTINUED | OUTPATIENT
Start: 2020-09-03 | End: 2020-09-09

## 2020-09-03 RX ORDER — HYDROCHLOROTHIAZIDE 25 MG
25 TABLET ORAL DAILY
Refills: 0 | Status: DISCONTINUED | OUTPATIENT
Start: 2020-09-03 | End: 2020-09-09

## 2020-09-03 RX ORDER — PANTOPRAZOLE SODIUM 20 MG/1
40 TABLET, DELAYED RELEASE ORAL
Refills: 0 | Status: DISCONTINUED | OUTPATIENT
Start: 2020-09-03 | End: 2020-09-09

## 2020-09-03 RX ORDER — AMLODIPINE BESYLATE 2.5 MG/1
5 TABLET ORAL DAILY
Refills: 0 | Status: DISCONTINUED | OUTPATIENT
Start: 2020-09-03 | End: 2020-09-03

## 2020-09-03 RX ORDER — ONDANSETRON 8 MG/1
4 TABLET, FILM COATED ORAL ONCE
Refills: 0 | Status: COMPLETED | OUTPATIENT
Start: 2020-09-03 | End: 2020-09-03

## 2020-09-03 RX ORDER — HYDRALAZINE HCL 50 MG
75 TABLET ORAL THREE TIMES A DAY
Refills: 0 | Status: DISCONTINUED | OUTPATIENT
Start: 2020-09-03 | End: 2020-09-04

## 2020-09-03 RX ORDER — FENTANYL CITRATE 50 UG/ML
25 INJECTION INTRAVENOUS ONCE
Refills: 0 | Status: DISCONTINUED | OUTPATIENT
Start: 2020-09-03 | End: 2020-09-03

## 2020-09-03 RX ADMIN — LOSARTAN POTASSIUM 25 MILLIGRAM(S): 100 TABLET, FILM COATED ORAL at 08:39

## 2020-09-03 RX ADMIN — HEPARIN SODIUM 5000 UNIT(S): 5000 INJECTION INTRAVENOUS; SUBCUTANEOUS at 17:33

## 2020-09-03 RX ADMIN — Medication 10 MILLIGRAM(S): at 17:33

## 2020-09-03 RX ADMIN — FENTANYL CITRATE 25 MICROGRAM(S): 50 INJECTION INTRAVENOUS at 00:20

## 2020-09-03 RX ADMIN — Medication 650 MILLIGRAM(S): at 08:48

## 2020-09-03 RX ADMIN — Medication 25 MILLIGRAM(S): at 09:28

## 2020-09-03 RX ADMIN — AMLODIPINE BESYLATE 5 MILLIGRAM(S): 2.5 TABLET ORAL at 08:39

## 2020-09-03 RX ADMIN — Medication 75 MILLIGRAM(S): at 14:19

## 2020-09-03 RX ADMIN — Medication 10 MILLIGRAM(S): at 16:43

## 2020-09-03 RX ADMIN — Medication 3 MILLIGRAM(S): at 21:39

## 2020-09-03 RX ADMIN — ONDANSETRON 4 MILLIGRAM(S): 8 TABLET, FILM COATED ORAL at 16:43

## 2020-09-03 RX ADMIN — Medication 40 MILLIEQUIVALENT(S): at 08:39

## 2020-09-03 RX ADMIN — HEPARIN SODIUM 10 UNIT(S)/HR: 5000 INJECTION INTRAVENOUS; SUBCUTANEOUS at 01:35

## 2020-09-03 RX ADMIN — Medication 3 MICROGRAM(S)/MIN: at 00:06

## 2020-09-03 RX ADMIN — Medication 650 MILLIGRAM(S): at 16:44

## 2020-09-03 RX ADMIN — Medication 20 MILLIGRAM(S): at 14:26

## 2020-09-03 RX ADMIN — Medication 650 MILLIGRAM(S): at 16:14

## 2020-09-03 RX ADMIN — CANGRELOR 109 MICROGRAM(S)/KG/MIN: 50 INJECTION, POWDER, LYOPHILIZED, FOR SOLUTION INTRAVENOUS at 01:49

## 2020-09-03 RX ADMIN — Medication 81 MILLIGRAM(S): at 11:16

## 2020-09-03 RX ADMIN — Medication 3 MICROGRAM(S)/MIN: at 19:00

## 2020-09-03 RX ADMIN — PANTOPRAZOLE SODIUM 40 MILLIGRAM(S): 20 TABLET, DELAYED RELEASE ORAL at 09:07

## 2020-09-03 RX ADMIN — ATORVASTATIN CALCIUM 80 MILLIGRAM(S): 80 TABLET, FILM COATED ORAL at 21:39

## 2020-09-03 RX ADMIN — Medication 3 MICROGRAM(S)/MIN: at 11:16

## 2020-09-03 RX ADMIN — Medication 75 MILLIGRAM(S): at 05:41

## 2020-09-03 RX ADMIN — CHLORHEXIDINE GLUCONATE 1 APPLICATION(S): 213 SOLUTION TOPICAL at 07:49

## 2020-09-03 RX ADMIN — Medication 650 MILLIGRAM(S): at 07:48

## 2020-09-03 RX ADMIN — FENTANYL CITRATE 25 MICROGRAM(S): 50 INJECTION INTRAVENOUS at 00:35

## 2020-09-03 RX ADMIN — Medication 75 MILLIGRAM(S): at 21:39

## 2020-09-03 RX ADMIN — HEPARIN SODIUM 5000 UNIT(S): 5000 INJECTION INTRAVENOUS; SUBCUTANEOUS at 05:42

## 2020-09-03 NOTE — PROCEDURE NOTE - NSSITEPREP_SKIN_A_CORE
alcohol/Adherence to aseptic technique: hand hygiene prior to donning barriers (gown, gloves), don cap and mask, sterile drape over patient/chlorhexidine

## 2020-09-03 NOTE — PROCEDURE NOTE - NSPOSTCAREGUIDE_GEN_A_CORE
Keep the cast/splint/dressing clean and dry/Care for catheter as per unit/ICU protocols/Instructed patient/caregiver regarding signs and symptoms of infection/Instructed patient/caregiver to follow-up with primary care physician/Verbal/written post procedure instructions were given to patient/caregiver

## 2020-09-03 NOTE — CONSULT NOTE ADULT - ATTENDING COMMENTS
Patient seen and examined.  Agree with above.   Admitted with hypertensive urgency and chest pain  MI ruled out  Check TTE  Check NST when medically optimized  Continue with supportive care per CCU  Wean off nitro gtt when possible  BP monitoring with a-line    Jc Razo MD, FACC

## 2020-09-03 NOTE — PROGRESS NOTE ADULT - SUBJECTIVE AND OBJECTIVE BOX
HISTORY OF PRESENT ILLNESS:   Ms Capone follows with Dr Vital at [facility].  She is a 71yo woman with hx CAD and uncontrolled severe hypertension.  She speaks Khmer.  History with aide of  at bedside.    She presented with chest pain for a few days duration, and admitted to the CCU for management of hypertensive emergency with SBP >220mmHg.  She was bradycardic at that time with sinus heartrate in the 40s. She has a new LBBB compared to EKG from 1/2020 with narrow QRS complex.    She states she is not having any palpitations, orthopnea, LE swelling, shortness of breath.  Can walk a few blocks without D.O.E..  Has been aware of slow heartrate for a few years.  No angina at this time.  No history of lightheadedness/near-syncope or syncope. A 10 pt ROS is negative.      PAST MEDICAL & SURGICAL HISTORY:  Sinus bradycardia  LBBB (left bundle branch block)  Mixed hyperlipidemia  CAD (coronary artery disease)  Vertigo  HTN (Hypertension)  H/O cardiac catheterization: Normal. 2007.      MEDICATIONS  (STANDING):  aspirin  chewable 81 milliGRAM(s) Oral daily  atorvastatin 80 milliGRAM(s) Oral at bedtime  chlorhexidine 4% Liquid 1 Application(s) Topical <User Schedule>  furosemide    Tablet 20 milliGRAM(s) Oral daily  heparin   Injectable 5000 Unit(s) SubCutaneous every 12 hours  hydrALAZINE 75 milliGRAM(s) Oral three times a day  hydrochlorothiazide 25 milliGRAM(s) Oral daily  influenza   Vaccine 0.5 milliLiter(s) IntraMuscular once  melatonin 3 milliGRAM(s) Oral at bedtime  nitroglycerin  Infusion 10 MICROgram(s)/Min (3 mL/Hr) IV Continuous <Continuous>  pantoprazole    Tablet 40 milliGRAM(s) Oral before breakfast    Allergies    No Known Allergies    Intolerances    FAMILY HISTORY:  Family history of malignant neoplasm of uterus (Sibling)  Family history of breast cancer (Sibling)  Family history of ischemic heart disease  Family history of diabetes mellitus in father  Family history of hypertension in father    Non-contributary for premature coronary disease or sudden cardiac death    SOCIAL HISTORY:    [x ] Non-smoker  [ ] Smoker  [ ] Alcohol    PHYSICAL EXAM:  T(C): 36.9 (09-03-20 @ 12:00), Max: 36.9 (09-02-20 @ 20:52)  HR: 50 (09-03-20 @ 14:00) (43 - 80)  BP: 161/53 (09-03-20 @ 14:00) (149/49 - 239/78)  RR: 15 (09-03-20 @ 14:00) (12 - 22)  SpO2: 95% (09-03-20 @ 14:00) (93% - 99%)  Wt(kg): --    Appearance: Overweight  female in no acute distress	  HEENT:   Normal oral mucosa, PERRL, EOMI	  Lymphatic: No lymphadenopathy , no edema  Cardiovascular: Normal S1 S2, No JVD, No murmurs , Peripheral pulses palpable 2+ bilaterally  Respiratory: Lungs clear to auscultation, normal effort 	  Gastrointestinal:  Soft, Non-tender, + BS	  Skin: No rashes, No ecchymoses, No cyanosis, warm to touch  Musculoskeletal: Normal range of motion, normal strength  Psychiatry:  Mood & affect appropriate    TELEMETRY: sinus radha 40s, to sinus rhythm 70-80.	    ECG:  sinus radha 40s, normal LA, LBBB 140ms.  Echo: Pending	  	  LABS:	 	                          11.2   8.02  )-----------( 254      ( 03 Sep 2020 06:07 )             35.5     09-03    141  |  105  |  13  ----------------------------<  103<H>  3.4<L>   |  24  |  0.53    Ca    9.1      03 Sep 2020 06:07  Phos  3.1     09-03  Mg     2.3     09-03    TPro  6.7  /  Alb  3.6  /  TBili  0.8  /  DBili  x   /  AST  12  /  ALT  10  /  AlkPhos  93  09-03    proBNP: Serum Pro-Brain Natriuretic Peptide: 1013 pg/mL (09-03 @ 06:07)  TSH: Thyroid Stimulating Hormone, Serum: 5.06 uIU/mL (09-03 @ 06:07)      ASSESSMENT/PLAN: 	70y Female with CAD, pre-diabetes, HTN, presents with atypical chest pain and hypertensive emergency.    Echo pending, which will guide necessity of BB+ ARB/ARNI therapy.  LBBB is new compared to last EKG with narrow QRS in 1/2020.  Normal LA suggests conduction defect below the bundle of His.  Will observe heartrate to see maximum rate of conduction, and to look for any alternating LBBB/RBBB morphology.  Will follow.    Phong Starks M.D.  Cardiac Electrophysiology    office [phone]  pager [phone]

## 2020-09-03 NOTE — CONSULT NOTE ADULT - SUBJECTIVE AND OBJECTIVE BOX
HISTORY OF PRESENT ILLNESS:   Ms Capone follows with Dr Vital at [facility].  She is a 69yo woman with hx CAD and uncontrolled severe hypertension.  She speaks Bengali.  History with aide of  at bedside.    She presented with chest pain for a few days duration, and admitted to the CCU for management of hypertensive emergency with SBP >220mmHg.  She was bradycardic at that time with sinus heartrate in the 40s. She has a new LBBB compared to EKG from 1/2020 with narrow QRS complex.    She states she is not having any palpitations, orthopnea, LE swelling, shortness of breath.  Can walk a few blocks without D.O.E..  Has been aware of slow heartrate for a few years.  No angina at this time.  No history of lightheadedness/near-syncope or syncope. A 10 pt ROS is negative.      PAST MEDICAL & SURGICAL HISTORY:  Sinus bradycardia  LBBB (left bundle branch block)  Mixed hyperlipidemia  CAD (coronary artery disease)  Vertigo  HTN (Hypertension)  H/O cardiac catheterization: Normal. 2007.      MEDICATIONS  (STANDING):  aspirin  chewable 81 milliGRAM(s) Oral daily  atorvastatin 80 milliGRAM(s) Oral at bedtime  chlorhexidine 4% Liquid 1 Application(s) Topical <User Schedule>  furosemide    Tablet 20 milliGRAM(s) Oral daily  heparin   Injectable 5000 Unit(s) SubCutaneous every 12 hours  hydrALAZINE 75 milliGRAM(s) Oral three times a day  hydrochlorothiazide 25 milliGRAM(s) Oral daily  influenza   Vaccine 0.5 milliLiter(s) IntraMuscular once  melatonin 3 milliGRAM(s) Oral at bedtime  nitroglycerin  Infusion 10 MICROgram(s)/Min (3 mL/Hr) IV Continuous <Continuous>  pantoprazole    Tablet 40 milliGRAM(s) Oral before breakfast    Allergies    No Known Allergies    Intolerances    FAMILY HISTORY:  Family history of malignant neoplasm of uterus (Sibling)  Family history of breast cancer (Sibling)  Family history of ischemic heart disease  Family history of diabetes mellitus in father  Family history of hypertension in father    Non-contributary for premature coronary disease or sudden cardiac death    SOCIAL HISTORY:    [x ] Non-smoker  [ ] Smoker  [ ] Alcohol    PHYSICAL EXAM:  T(C): 36.9 (09-03-20 @ 12:00), Max: 36.9 (09-02-20 @ 20:52)  HR: 50 (09-03-20 @ 14:00) (43 - 80)  BP: 161/53 (09-03-20 @ 14:00) (149/49 - 239/78)  RR: 15 (09-03-20 @ 14:00) (12 - 22)  SpO2: 95% (09-03-20 @ 14:00) (93% - 99%)  Wt(kg): --    Appearance: Overweight  female in no acute distress	  HEENT:   Normal oral mucosa, PERRL, EOMI	  Lymphatic: No lymphadenopathy , no edema  Cardiovascular: Normal S1 S2, No JVD, No murmurs , Peripheral pulses palpable 2+ bilaterally  Respiratory: Lungs clear to auscultation, normal effort 	  Gastrointestinal:  Soft, Non-tender, + BS	  Skin: No rashes, No ecchymoses, No cyanosis, warm to touch  Musculoskeletal: Normal range of motion, normal strength  Psychiatry:  Mood & affect appropriate    TELEMETRY: sinus radha 40s, to sinus rhythm 70-80.	    ECG:  sinus radha 40s, normal OK, LBBB 140ms.  Echo: Pending	  	  LABS:	 	                          11.2   8.02  )-----------( 254      ( 03 Sep 2020 06:07 )             35.5     09-03    141  |  105  |  13  ----------------------------<  103<H>  3.4<L>   |  24  |  0.53    Ca    9.1      03 Sep 2020 06:07  Phos  3.1     09-03  Mg     2.3     09-03    TPro  6.7  /  Alb  3.6  /  TBili  0.8  /  DBili  x   /  AST  12  /  ALT  10  /  AlkPhos  93  09-03    proBNP: Serum Pro-Brain Natriuretic Peptide: 1013 pg/mL (09-03 @ 06:07)  TSH: Thyroid Stimulating Hormone, Serum: 5.06 uIU/mL (09-03 @ 06:07)      ASSESSMENT/PLAN: 	70y Female with CAD, pre-diabetes, HTN, presents with atypical chest pain and hypertensive emergency.    Echo pending, which will guide necessity of BB+ ARB/ARNI therapy.  LBBB is new compared to last EKG with narrow QRS in 1/2020.  Normal OK suggests conduction defect below the bundle of His.  Will observe heartrate to see maximum rate of conduction, and to look for any alternating LBBB/RBBB morphology.  Will follow.    Phong Starks M.D.  Cardiac Electrophysiology    office [phone]  pager [phone]

## 2020-09-03 NOTE — PROCEDURE NOTE - NSPROCDETAILS_GEN_ALL_CORE
positive blood return obtained via catheter/sutured in place/hemostasis with direct pressure, dressing applied/ultrasound guidance/location identified, draped/prepped, sterile technique used, needle inserted/introduced/connected to a pressurized flush line/Seldinger technique/all materials/supplies accounted for at end of procedure

## 2020-09-03 NOTE — PROGRESS NOTE ADULT - ASSESSMENT
70y female history of ?CAD, HTN, HLD, LBBB, sinus bradycardia, pre-DM presents to the ED with severe HTN and chest pain. EKG w/ LBBB and ST changes.    Plan:    Neuro:  - On aricept 5mg  - Alert and oriented  - No further issues    HEENT:   - No active issues    Cardiovascular:    Chest pain  - Loaded with ASA and Cangrelor infusion and heparin gtt as per ACS protocol last night. Dc'd this morning  - HsT 16 and 15.  - No need for urgent cath at this point  - Will consider stress test when blood pressure is controlled  - TTE in am.   - Continue nitro drip, will consider conversion PO meds today    HTN:  - On nitro drip at 45mcg, will attempt to titrate nitro drip off  - Continue Hydralazine, up titrate as needed  - Start amlodipine 5mg  - Start Losartan 25mg    Cholesterol:  - Continue Lipitor 80mg  - Lipid panel reviewed    Respiratory:  - No active issues  - Satting 97% on RA    GI:  - No active issues  - Start DASH Diet    Renal:  - SCr: 0.53  - Net negative 1128    ID:  - Patient afebrile  - Covid negative  - No active issues  - Urinalysis negative    DVT prophylaxis:  - Heparin SQ 70y female history of ?CAD, HTN, HLD, LBBB, AIHA, sinus bradycardia, pre-DM presents to the ED with severe HTN and chest pain. EKG w/ LBBB and ST changes.    Plan:    Neuro:  - On aricept 5mg  - Alert and oriented  - No further issues    HEENT:   - No active issues    Cardiovascular:    Chest pain  - Loaded with ASA and Cangrelor infusion and heparin gtt as per ACS protocol last night. Dc'd this morning  - HsT 16 and 15.  - No need for urgent cath at this point  - Will consider stress test when blood pressure is controlled  - TTE in am.   - Continue nitro drip, will consider conversion PO meds today    HTN:  - On nitro drip at 45mcg, will attempt to titrate nitro drip off  - Continue Hydralazine, up titrate as needed  - Start amlodipine 5mg  - Start Losartan 25mg    Cholesterol:  - Continue Lipitor 80mg  - Lipid panel reviewed    Respiratory:  - No active issues  - Satting 97% on RA    GI:  - No active issues  - Start DASH Diet    Renal:  - SCr: 0.53  - Net negative 1128    ID:  - Patient afebrile  - Covid negative  - No active issues  - Urinalysis negative    DVT prophylaxis:  - Heparin SQ

## 2020-09-03 NOTE — PROGRESS NOTE ADULT - SUBJECTIVE AND OBJECTIVE BOX
CHIEF COMPLAINT: Patient is a 70y old  Female who presents with a chief complaint of chest pain (02 Sep 2020 22:29)      INTERVAL HISTORY/OVERNIGHT EVENTS: Patient admitted overnight. Heparin gtt and Cangrelor gtt dc'd. Patient is c/o of headache this morning. Denies chest pain, sob, nausea, vomiting.   REVIEW OF SYSTEMS:    Constitutional:     [ ] negative [ ] fevers [ ] chills [ ] weight loss [ ] weight gain  HEENT:                  [ ] negative [ ] dry eyes [ ] eye irritation [ ] postnasal drip [ ] nasal congestion  CV:                         [ ] negative  [ ] chest pain [ ] orthopnea [ ] palpitations [ ] murmur  Resp:                     [ ] negative [ ] cough [ ] shortness of breath [ ] dyspnea [ ] wheezing [ ] sputum [ ] hemoptysis  GI:                          [ ] negative [ ] nausea [ ] vomiting [ ] diarrhea [ ] constipation [ ] abd pain [ ] dysphagia   :                        [ ] negative [ ] dysuria [ ] nocturia [ ] hematuria [ ] increased urinary frequency  Musculoskeletal: [ ] negative [ ] back pain [ ] myalgias [ ] arthralgias [ ] fracture  Skin:                       [ ] negative [ ] rash [ ] itch  Neurological:        [ ] negative [x] headache [ ] dizziness [ ] syncope [ ] weakness [ ] numbness  Psychiatric:           [ ] negative [ ] anxiety [ ] depression  Endocrine:            [ ] negative [ ] diabetes [ ] thyroid problem  Heme/Lymph:      [ ] negative [ ] anemia [ ] bleeding problem  Allergic/Immune: [ ] negative [ ] itchy eyes [ ] nasal discharge [ ] hives [ ] angioedema    [x] All other systems negative  [ ] Unable to assess ROS because ________.    MEDICATIONS:  MEDICATIONS  (STANDING):  aspirin  chewable 81 milliGRAM(s) Oral daily  atorvastatin 80 milliGRAM(s) Oral at bedtime  chlorhexidine 4% Liquid 1 Application(s) Topical <User Schedule>  heparin   Injectable 5000 Unit(s) SubCutaneous every 12 hours  hydrALAZINE 75 milliGRAM(s) Oral three times a day  influenza   Vaccine 0.5 milliLiter(s) IntraMuscular once  melatonin 3 milliGRAM(s) Oral at bedtime  nitroglycerin  Infusion 10 MICROgram(s)/Min (3 mL/Hr) IV Continuous <Continuous>    MEDICATIONS  (PRN):  acetaminophen   Tablet .. 650 milliGRAM(s) Oral every 6 hours PRN Mild Pain (1 - 3)      ALLERGIES: No Known Allergies      OBJECTIVE:  ICU Vital Signs Last 24 Hrs  T(C): 36.8 (03 Sep 2020 04:00), Max: 36.9 (02 Sep 2020 20:52)  T(F): 98.2 (03 Sep 2020 04:00), Max: 98.4 (02 Sep 2020 20:52)  HR: 47 (03 Sep 2020 07:00) (43 - 80)  BP: 164/61 (03 Sep 2020 04:00) (164/61 - 239/78)  BP(mean): 87 (03 Sep 2020 04:00) (87 - 141)  ABP: 172/56 (03 Sep 2020 07:00) (136/80 - 225/76)  ABP(mean): 94 (03 Sep 2020 07:00) (94 - 124)  RR: 13 (03 Sep 2020 07:00) (12 - 22)  SpO2: 95% (03 Sep 2020 07:00) (93% - 99%)            I&O's Summary  02 Sep 2020 07:01  -  03 Sep 2020 07:00  --------------------------------------------------------  IN: 1137 mL / OUT: 2150 mL / NET: -1013 mL      Daily Height in cm: 165.1 (02 Sep 2020 23:04)    Daily Weight in k.2 (03 Sep 2020 05:00)    PHYSICAL EXAMINATION:  General: Comfortable, no acute distress, cooperative with exam.  Respiratory: CTAB, normal respiratory effort, no coughing, wheezes, crackles, or rales.  CV: RRR, S1S2, no murmurs, rubs or gallops. No JVD. Distal pulses intact.  Abdominal: Soft, nontender, nondistended, no rebound or guarding, normal bowel sounds.  Neurology: AOx3, no focal neuro defects, DOUGLAS x 4.  Extremities: No pitting edema, + Peripheral pulses.      LABS:                          11.2   8.02  )-----------( 254      ( 03 Sep 2020 06:07 )             35.5     09-03    141  |  105  |  13  ----------------------------<  103<H>  3.4<L>   |  24  |  0.53    Ca    9.1      03 Sep 2020 06:07  Phos  3.1       Mg     2.3         TPro  6.7  /  Alb  3.6  /  TBili  0.8  /  DBili  x   /  AST  12  /  ALT  10  /  AlkPhos  93  03    LIVER FUNCTIONS - ( 03 Sep 2020 06:07 )  Alb: 3.6 g/dL / Pro: 6.7 g/dL / ALK PHOS: 93 u/L / ALT: 10 u/L / AST: 12 u/L / GGT: x           PT/INR - ( 02 Sep 2020 21:48 )   PT: 11.5 SEC;   INR: 1.00          PTT - ( 03 Sep 2020 06:07 )  PTT:32.0 SEC    Creatine Kinase, Serum: 55 u/L ( @ 06:07)    CARDIAC MARKERS ( 03 Sep 2020 06:07 ): Reviewed      Urinalysis Basic - ( 03 Sep 2020 01:08 )  Color: COLORLESS / Appearance: CLEAR / S.007 / pH: 7.5  Gluc: NEGATIVE / Ketone: NEGATIVE  / Bili: NEGATIVE / Urobili: NORMAL   Blood: NEGATIVE / Protein: NEGATIVE / Nitrite: NEGATIVE   Leuk Esterase: NEGATIVE / RBC: 0-2 / WBC 0-2   Sq Epi: x / Non Sq Epi: x / Bacteria: x        EKG: Sinus radha, LBBB, Left Axis deviation    IMAGING: Chest Xray reviewed Trevin Ca NP  [facility] Service  Cardiology   Spect: 61847 ([facility])       CHIEF COMPLAINT: Patient is a 70y old  Female who presents with a chief complaint of chest pain (02 Sep 2020 22:29)      INTERVAL HISTORY/OVERNIGHT EVENTS: Patient admitted overnight. Heparin gtt and Cangrelor gtt dc'd. Patient is c/o of headache this morning. Denies chest pain, sob, nausea, vomiting.   REVIEW OF SYSTEMS:    Constitutional:     [ ] negative [ ] fevers [ ] chills [ ] weight loss [ ] weight gain  HEENT:                  [ ] negative [ ] dry eyes [ ] eye irritation [ ] postnasal drip [ ] nasal congestion  CV:                         [ ] negative  [ ] chest pain [ ] orthopnea [ ] palpitations [ ] murmur  Resp:                     [ ] negative [ ] cough [ ] shortness of breath [ ] dyspnea [ ] wheezing [ ] sputum [ ] hemoptysis  GI:                          [ ] negative [ ] nausea [ ] vomiting [ ] diarrhea [ ] constipation [ ] abd pain [ ] dysphagia   :                        [ ] negative [ ] dysuria [ ] nocturia [ ] hematuria [ ] increased urinary frequency  Musculoskeletal: [ ] negative [ ] back pain [ ] myalgias [ ] arthralgias [ ] fracture  Skin:                       [ ] negative [ ] rash [ ] itch  Neurological:        [ ] negative [x] headache [ ] dizziness [ ] syncope [ ] weakness [ ] numbness  Psychiatric:           [ ] negative [ ] anxiety [ ] depression  Endocrine:            [ ] negative [ ] diabetes [ ] thyroid problem  Heme/Lymph:      [ ] negative [ ] anemia [ ] bleeding problem  Allergic/Immune: [ ] negative [ ] itchy eyes [ ] nasal discharge [ ] hives [ ] angioedema    [x] All other systems negative  [ ] Unable to assess ROS because ________.    MEDICATIONS:  MEDICATIONS  (STANDING):  aspirin  chewable 81 milliGRAM(s) Oral daily  atorvastatin 80 milliGRAM(s) Oral at bedtime  chlorhexidine 4% Liquid 1 Application(s) Topical <User Schedule>  heparin   Injectable 5000 Unit(s) SubCutaneous every 12 hours  hydrALAZINE 75 milliGRAM(s) Oral three times a day  influenza   Vaccine 0.5 milliLiter(s) IntraMuscular once  melatonin 3 milliGRAM(s) Oral at bedtime  nitroglycerin  Infusion 10 MICROgram(s)/Min (3 mL/Hr) IV Continuous <Continuous>    MEDICATIONS  (PRN):  acetaminophen   Tablet .. 650 milliGRAM(s) Oral every 6 hours PRN Mild Pain (1 - 3)      ALLERGIES: No Known Allergies      OBJECTIVE:  ICU Vital Signs Last 24 Hrs  T(C): 36.8 (03 Sep 2020 04:00), Max: 36.9 (02 Sep 2020 20:52)  T(F): 98.2 (03 Sep 2020 04:00), Max: 98.4 (02 Sep 2020 20:52)  HR: 47 (03 Sep 2020 07:00) (43 - 80)  BP: 164/61 (03 Sep 2020 04:00) (164/61 - 239/78)  BP(mean): 87 (03 Sep 2020 04:00) (87 - 141)  ABP: 172/56 (03 Sep 2020 07:00) (136/80 - 225/76)  ABP(mean): 94 (03 Sep 2020 07:00) (94 - 124)  RR: 13 (03 Sep 2020 07:00) (12 - 22)  SpO2: 95% (03 Sep 2020 07:00) (93% - 99%)        I&O's Summary  02 Sep 2020 07:01  -  03 Sep 2020 07:00  --------------------------------------------------------  IN: 1137 mL / OUT: 2150 mL / NET: -1013 mL      Daily Height in cm: 165.1 (02 Sep 2020 23:04)    Daily Weight in k.2 (03 Sep 2020 05:00)    PHYSICAL EXAMINATION:  General: Comfortable, no acute distress, cooperative with exam.  Respiratory: CTAB, normal respiratory effort, no coughing, wheezes, crackles, or rales.  CV: RRR, S1S2, no murmurs, rubs or gallops. No JVD. Distal pulses intact.  Abdominal: Soft, nontender, nondistended, no rebound or guarding, normal bowel sounds.  Neurology: AOx3, no focal neuro defects, DOUGLAS x 4.  Extremities: No pitting edema, + Peripheral pulses.      LABS:                          11.2   8.02  )-----------( 254      ( 03 Sep 2020 06:07 )             35.5     09-03    141  |  105  |  13  ----------------------------<  103<H>  3.4<L>   |  24  |  0.53    Ca    9.1      03 Sep 2020 06:07  Phos  3.1       Mg     2.3         TPro  6.7  /  Alb  3.6  /  TBili  0.8  /  DBili  x   /  AST  12  /  ALT  10  /  AlkPhos  93  03    LIVER FUNCTIONS - ( 03 Sep 2020 06:07 )  Alb: 3.6 g/dL / Pro: 6.7 g/dL / ALK PHOS: 93 u/L / ALT: 10 u/L / AST: 12 u/L / GGT: x           PT/INR - ( 02 Sep 2020 21:48 )   PT: 11.5 SEC;   INR: 1.00          PTT - ( 03 Sep 2020 06:07 )  PTT:32.0 SEC    Creatine Kinase, Serum: 55 u/L ( @ 06:07)    CARDIAC MARKERS ( 03 Sep 2020 06:07 ): Reviewed      Urinalysis Basic - ( 03 Sep 2020 01:08 )  Color: COLORLESS / Appearance: CLEAR / S.007 / pH: 7.5  Gluc: NEGATIVE / Ketone: NEGATIVE  / Bili: NEGATIVE / Urobili: NORMAL   Blood: NEGATIVE / Protein: NEGATIVE / Nitrite: NEGATIVE   Leuk Esterase: NEGATIVE / RBC: 0-2 / WBC 0-2   Sq Epi: x / Non Sq Epi: x / Bacteria: x        EKG: Sinus radha, LBBB, Left Axis deviation    IMAGING: Chest Xray reviewed Trevin Ca NP  [facility] Service  Cardiology   Spect: 62676 ([facility])       CHIEF COMPLAINT: Patient is a 70y old  Female who presents with a chief complaint of chest pain (02 Sep 2020 22:29)      INTERVAL HISTORY/OVERNIGHT EVENTS: Patient admitted overnight. Heparin gtt and Cangrelor gtt dc'd. Patient is c/o of headache this morning. Denies chest pain, sob, nausea, vomiting.   REVIEW OF SYSTEMS:    Constitutional:     [ ] negative [ ] fevers [ ] chills [ ] weight loss [ ] weight gain  HEENT:                  [ ] negative [ ] dry eyes [ ] eye irritation [ ] postnasal drip [ ] nasal congestion  CV:                         [ ] negative  [ ] chest pain [ ] orthopnea [ ] palpitations [ ] murmur  Resp:                     [ ] negative [ ] cough [ ] shortness of breath [ ] dyspnea [ ] wheezing [ ] sputum [ ] hemoptysis  GI:                          [ ] negative [ ] nausea [ ] vomiting [ ] diarrhea [ ] constipation [ ] abd pain [ ] dysphagia   :                        [ ] negative [ ] dysuria [ ] nocturia [ ] hematuria [ ] increased urinary frequency  Musculoskeletal: [ ] negative [ ] back pain [ ] myalgias [ ] arthralgias [ ] fracture  Skin:                       [ ] negative [ ] rash [ ] itch  Neurological:        [ ] negative [x] headache [ ] dizziness [ ] syncope [ ] weakness [ ] numbness  Psychiatric:           [ ] negative [ ] anxiety [ ] depression  Endocrine:            [ ] negative [ ] diabetes [ ] thyroid problem  Heme/Lymph:      [ ] negative [ ] anemia [ ] bleeding problem  Allergic/Immune: [ ] negative [ ] itchy eyes [ ] nasal discharge [ ] hives [ ] angioedema    [x] All other systems negative  [ ] Unable to assess ROS because ________.    MEDICATIONS:  MEDICATIONS  (STANDING):  aspirin  chewable 81 milliGRAM(s) Oral daily  atorvastatin 80 milliGRAM(s) Oral at bedtime  chlorhexidine 4% Liquid 1 Application(s) Topical <User Schedule>  heparin   Injectable 5000 Unit(s) SubCutaneous every 12 hours  hydrALAZINE 75 milliGRAM(s) Oral three times a day  influenza   Vaccine 0.5 milliLiter(s) IntraMuscular once  melatonin 3 milliGRAM(s) Oral at bedtime  nitroglycerin  Infusion 10 MICROgram(s)/Min (3 mL/Hr) IV Continuous <Continuous>    MEDICATIONS  (PRN):  acetaminophen   Tablet .. 650 milliGRAM(s) Oral every 6 hours PRN Mild Pain (1 - 3)      ALLERGIES: No Known Allergies      OBJECTIVE:  ICU Vital Signs Last 24 Hrs  T(C): 36.8 (03 Sep 2020 04:00), Max: 36.9 (02 Sep 2020 20:52)  T(F): 98.2 (03 Sep 2020 04:00), Max: 98.4 (02 Sep 2020 20:52)  HR: 47 (03 Sep 2020 07:00) (43 - 80)  BP: 164/61 (03 Sep 2020 04:00) (164/61 - 239/78)  BP(mean): 87 (03 Sep 2020 04:00) (87 - 141)  ABP: 172/56 (03 Sep 2020 07:00) (136/80 - 225/76)  ABP(mean): 94 (03 Sep 2020 07:00) (94 - 124)  RR: 13 (03 Sep 2020 07:00) (12 - 22)  SpO2: 95% (03 Sep 2020 07:00) (93% - 99%)        I&O's Summary  02 Sep 2020 07:01  -  03 Sep 2020 07:00  --------------------------------------------------------  IN: 1137 mL / OUT: 2150 mL / NET: -1013 mL      Daily Height in cm: 165.1 (02 Sep 2020 23:04)    Daily Weight in k.2 (03 Sep 2020 05:00)    PHYSICAL EXAMINATION:  General: Comfortable, no acute distress, cooperative with exam.  Respiratory: CTAB, normal respiratory effort, no coughing, wheezes, crackles, or rales.  CV: RRR, S1S2, no murmurs, rubs or gallops. No JVD. Distal pulses intact.  Abdominal: Soft, nontender, nondistended, no rebound or guarding, normal bowel sounds.  Neurology: AOx3, no focal neuro defects, DOUGLAS x 4.  Extremities: No pitting edema, + Peripheral pulses.  Lines: A-Line right wrist, no active issues      LABS:                          11.2   8.02  )-----------( 254      ( 03 Sep 2020 06:07 )             35.5     -03    141  |  105  |  13  ----------------------------<  103<H>  3.4<L>   |  24  |  0.53    Ca    9.1      03 Sep 2020 06:07  Phos  3.1       Mg     2.3         TPro  6.7  /  Alb  3.6  /  TBili  0.8  /  DBili  x   /  AST  12  /  ALT  10  /  AlkPhos  93  09-03    LIVER FUNCTIONS - ( 03 Sep 2020 06:07 )  Alb: 3.6 g/dL / Pro: 6.7 g/dL / ALK PHOS: 93 u/L / ALT: 10 u/L / AST: 12 u/L / GGT: x           PT/INR - ( 02 Sep 2020 21:48 )   PT: 11.5 SEC;   INR: 1.00          PTT - ( 03 Sep 2020 06:07 )  PTT:32.0 SEC    Creatine Kinase, Serum: 55 u/L ( @ 06:07)    CARDIAC MARKERS ( 03 Sep 2020 06:07 ): Reviewed      Urinalysis Basic - ( 03 Sep 2020 01:08 )  Color: COLORLESS / Appearance: CLEAR / S.007 / pH: 7.5  Gluc: NEGATIVE / Ketone: NEGATIVE  / Bili: NEGATIVE / Urobili: NORMAL   Blood: NEGATIVE / Protein: NEGATIVE / Nitrite: NEGATIVE   Leuk Esterase: NEGATIVE / RBC: 0-2 / WBC 0-2   Sq Epi: x / Non Sq Epi: x / Bacteria: x        EKG: Sinus radha, LBBB, Left Axis deviation    IMAGING: Chest Xray reviewed

## 2020-09-03 NOTE — CONSULT NOTE ADULT - SUBJECTIVE AND OBJECTIVE BOX
HISTORY OF PRESENT ILLNESS: HPI:    70y female history of CAD, HTN, HLD, LBBB, sinus bradycardia, pre-DM presents to the ED with ongoing chest pain for 5 days. Chest pain described as midsternal sharp and intermittent.  Worsens with activity and better at rest with associated symptoms of SOB with activity.  Denies any radiation, diaphoresis, cough, fevers, dizziness, syncope, abd pain, back pain, N/V/D, recent sick contact, recent travel. Of note patient saw her outpatient cardiologist 3 days ago for the chest pain and was put on a holter monitor.  In the ED found to have severe HTN 's, bradycardia HR 40's with chest pain.    Last echo 2016 normal.   Last stress test 2017 normal  Last EKG sinus rhythm without LBBB in Jan 2020 (02 Sep 2020 22:29)      PAST MEDICAL & SURGICAL HISTORY:  Sinus bradycardia  LBBB (left bundle branch block)  Mixed hyperlipidemia  CAD (coronary artery disease)  Vertigo  HTN (Hypertension)  H/O cardiac catheterization: Normal. 2007.    MEDICATIONS  (STANDING):  aspirin  chewable 81 milliGRAM(s) Oral daily  atorvastatin 80 milliGRAM(s) Oral at bedtime  chlorhexidine 4% Liquid 1 Application(s) Topical <User Schedule>  furosemide    Tablet 20 milliGRAM(s) Oral daily  heparin   Injectable 5000 Unit(s) SubCutaneous every 12 hours  hydrALAZINE 75 milliGRAM(s) Oral three times a day  hydrochlorothiazide 25 milliGRAM(s) Oral daily  influenza   Vaccine 0.5 milliLiter(s) IntraMuscular once  melatonin 3 milliGRAM(s) Oral at bedtime  nitroglycerin  Infusion 10 MICROgram(s)/Min (3 mL/Hr) IV Continuous <Continuous>  pantoprazole    Tablet 40 milliGRAM(s) Oral before breakfast      Allergies  No Known Allergies    FAMILY HISTORY:  Family history of malignant neoplasm of uterus (Sibling)  Family history of breast cancer (Sibling)  Family history of ischemic heart disease  Family history of diabetes mellitus in father  Family history of hypertension in father  Noncontributory for premature coronary disease or sudden cardiac death    SOCIAL HISTORY:    [x ] Non-smoker  [ ] Smoker  [ ] Alcohol    FLU VACCINE THIS YEAR STARTS IN AUGUST:  [ ] Yes    [ ] No    IF OVER 65 HAVE YOU EVER HAD A PNA VACCINE:  [ ] Yes    [ ] No       [ ] N/A      REVIEW OF SYSTEMS:  [x ]chest pain  [  ]shortness of breath  [  ]palpitations  [  ]syncope  [ ]near syncope [ ]upper extremity weakness   [ ] lower extremity weakness  [  ]diplopia  [  ]altered mental status   [  ]fevers  [ ]chills [ ]nausea  [ ]vomitting  [  ]dysphagia    [ ]abdominal pain  [ ]melena  [ ]BRBPR    [  ]epistaxis  [  ]rash    [ ]lower extremity edema        [x ] All others negative	  [ ] Unable to obtain      LABS:	 	    CARDIAC MARKERS ( 03 Sep 2020 06:07 )  x     / x     / 55 u/L / 1.80 ng/mL / x      CARDIAC MARKERS ( 03 Sep 2020 01:08 )  x     / x     / 63 u/L / 1.82 ng/mL / x      CARDIAC MARKERS ( 02 Sep 2020 21:52 )  x     / x     / 83 u/L / 1.90 ng/mL / x        Trop T 16, 15, 16                          11.2   8.02  )-----------( 254      ( 03 Sep 2020 06:07 )             35.5     141  |  105  |  13  ----------------------------<  103<H>  3.4<L>   |  24  |  0.53    Ca    9.1      03 Sep 2020 06:07  Phos  3.1     09-03  Mg     2.3     09-03    TPro  6.7  /  Alb  3.6  /  TBili  0.8  /  DBili  x   /  AST  12  /  ALT  10  /  AlkPhos  93  09-03    Creatinine Trend: 0.53<--, 0.71<--    Coags:  PT/INR - ( 02 Sep 2020 21:48 )   PT: 11.5 SEC;   INR: 1.00         PTT - ( 03 Sep 2020 06:07 )  PTT:32.0 SEC    proBNP: Serum Pro-Brain Natriuretic Peptide: 1013 pg/mL (09-03 @ 06:07)    TSH: Thyroid Stimulating Hormone, Serum: 5.06 uIU/mL (09-03 @ 06:07)    PHYSICAL EXAM:  T(C): 36.9 (09-03-20 @ 12:00), Max: 36.9 (09-02-20 @ 20:52)  HR: 50 (09-03-20 @ 14:00) (43 - 80)  BP: 161/53 (09-03-20 @ 14:00) (149/49 - 239/78)  RR: 15 (09-03-20 @ 14:00) (12 - 22)  SpO2: 95% (09-03-20 @ 14:00) (93% - 99%)  Wt(kg): --   BMI (kg/m2): 34.2 (09-02-20 @ 23:04)  I&O's Summary    02 Sep 2020 07:01  -  03 Sep 2020 07:00  --------------------------------------------------------  IN: 1137 mL / OUT: 2150 mL / NET: -1013 mL    03 Sep 2020 07:01  -  03 Sep 2020 14:45  --------------------------------------------------------  IN: 438 mL / OUT: 1020 mL / NET: -582 mL    Gen: Appears well in NAD  HEENT:  (-)icterus (-)pallor  CV: N S1 S2 1/6 DORIS (+)2 Pulses B/l  Resp:  Clear to ausculatation B/L, normal effort  GI: (+) BS Soft, NT, ND  Lymph:  (-)Edema, (-)obvious lymphadenopathy  Skin: Warm to touch, Normal turgor  Psych: Appropriate mood and affect    TELEMETRY: SB	      ECG:  	SB LBBB    RADIOLOGY:         CXR:     ASSESSMENT/PLAN: 	70y Female HTN HLD LBBB admitted with HTN urgency and chest pain    --Pt admitted to CCU  --BP improving  --CPK negative, Trop T indeterminate  --plan for TTE and NST once BP stable  --EP eval for SB/LBBB

## 2020-09-04 LAB
ANION GAP SERPL CALC-SCNC: 14 MMO/L — SIGNIFICANT CHANGE UP (ref 7–14)
APTT BLD: 31.1 SEC — SIGNIFICANT CHANGE UP (ref 27–36.3)
BUN SERPL-MCNC: 12 MG/DL — SIGNIFICANT CHANGE UP (ref 7–23)
CALCIUM SERPL-MCNC: 9.1 MG/DL — SIGNIFICANT CHANGE UP (ref 8.4–10.5)
CHLORIDE SERPL-SCNC: 100 MMOL/L — SIGNIFICANT CHANGE UP (ref 98–107)
CO2 SERPL-SCNC: 25 MMOL/L — SIGNIFICANT CHANGE UP (ref 22–31)
CREAT SERPL-MCNC: 0.73 MG/DL — SIGNIFICANT CHANGE UP (ref 0.5–1.3)
GLUCOSE SERPL-MCNC: 114 MG/DL — HIGH (ref 70–99)
HBA1C BLD-MCNC: 5.4 % — SIGNIFICANT CHANGE UP (ref 4–5.6)
HCT VFR BLD CALC: 37.2 % — SIGNIFICANT CHANGE UP (ref 34.5–45)
HGB BLD-MCNC: 12 G/DL — SIGNIFICANT CHANGE UP (ref 11.5–15.5)
MAGNESIUM SERPL-MCNC: 2 MG/DL — SIGNIFICANT CHANGE UP (ref 1.6–2.6)
MCHC RBC-ENTMCNC: 27 PG — SIGNIFICANT CHANGE UP (ref 27–34)
MCHC RBC-ENTMCNC: 32.3 % — SIGNIFICANT CHANGE UP (ref 32–36)
MCV RBC AUTO: 83.6 FL — SIGNIFICANT CHANGE UP (ref 80–100)
NRBC # FLD: 0 K/UL — SIGNIFICANT CHANGE UP (ref 0–0)
PHOSPHATE SERPL-MCNC: 3.6 MG/DL — SIGNIFICANT CHANGE UP (ref 2.5–4.5)
PLATELET # BLD AUTO: 253 K/UL — SIGNIFICANT CHANGE UP (ref 150–400)
PMV BLD: 9.3 FL — SIGNIFICANT CHANGE UP (ref 7–13)
POTASSIUM SERPL-MCNC: 3.3 MMOL/L — LOW (ref 3.5–5.3)
POTASSIUM SERPL-SCNC: 3.3 MMOL/L — LOW (ref 3.5–5.3)
RBC # BLD: 4.45 M/UL — SIGNIFICANT CHANGE UP (ref 3.8–5.2)
RBC # FLD: 13.8 % — SIGNIFICANT CHANGE UP (ref 10.3–14.5)
SODIUM SERPL-SCNC: 139 MMOL/L — SIGNIFICANT CHANGE UP (ref 135–145)
WBC # BLD: 10.7 K/UL — HIGH (ref 3.8–10.5)
WBC # FLD AUTO: 10.7 K/UL — HIGH (ref 3.8–10.5)

## 2020-09-04 RX ORDER — POTASSIUM CHLORIDE 20 MEQ
40 PACKET (EA) ORAL EVERY 4 HOURS
Refills: 0 | Status: COMPLETED | OUTPATIENT
Start: 2020-09-04 | End: 2020-09-04

## 2020-09-04 RX ORDER — HYDRALAZINE HCL 50 MG
50 TABLET ORAL THREE TIMES A DAY
Refills: 0 | Status: DISCONTINUED | OUTPATIENT
Start: 2020-09-04 | End: 2020-09-05

## 2020-09-04 RX ORDER — HYDRALAZINE HCL 50 MG
50 TABLET ORAL THREE TIMES A DAY
Refills: 0 | Status: DISCONTINUED | OUTPATIENT
Start: 2020-09-04 | End: 2020-09-04

## 2020-09-04 RX ORDER — ATORVASTATIN CALCIUM 80 MG/1
40 TABLET, FILM COATED ORAL AT BEDTIME
Refills: 0 | Status: DISCONTINUED | OUTPATIENT
Start: 2020-09-04 | End: 2020-09-09

## 2020-09-04 RX ADMIN — Medication 20 MILLIGRAM(S): at 05:15

## 2020-09-04 RX ADMIN — AMLODIPINE BESYLATE 10 MILLIGRAM(S): 2.5 TABLET ORAL at 11:55

## 2020-09-04 RX ADMIN — Medication 81 MILLIGRAM(S): at 11:54

## 2020-09-04 RX ADMIN — Medication 25 MILLIGRAM(S): at 11:55

## 2020-09-04 RX ADMIN — Medication 40 MILLIEQUIVALENT(S): at 06:28

## 2020-09-04 RX ADMIN — ATORVASTATIN CALCIUM 40 MILLIGRAM(S): 80 TABLET, FILM COATED ORAL at 23:36

## 2020-09-04 RX ADMIN — LOSARTAN POTASSIUM 50 MILLIGRAM(S): 100 TABLET, FILM COATED ORAL at 05:15

## 2020-09-04 RX ADMIN — PANTOPRAZOLE SODIUM 40 MILLIGRAM(S): 20 TABLET, DELAYED RELEASE ORAL at 05:15

## 2020-09-04 RX ADMIN — HEPARIN SODIUM 5000 UNIT(S): 5000 INJECTION INTRAVENOUS; SUBCUTANEOUS at 17:45

## 2020-09-04 RX ADMIN — HEPARIN SODIUM 5000 UNIT(S): 5000 INJECTION INTRAVENOUS; SUBCUTANEOUS at 05:15

## 2020-09-04 RX ADMIN — Medication 75 MILLIGRAM(S): at 05:15

## 2020-09-04 RX ADMIN — CHLORHEXIDINE GLUCONATE 1 APPLICATION(S): 213 SOLUTION TOPICAL at 11:55

## 2020-09-04 RX ADMIN — Medication 40 MILLIEQUIVALENT(S): at 10:28

## 2020-09-04 NOTE — PROGRESS NOTE ADULT - ASSESSMENT
70y female history of ?CAD, HTN, HLD, LBBB, AIHA, sinus bradycardia, pre-DM presents to the ED with severe HTN and chest pain. EKG w/ LBBB and ST changes.    Plan:    Neuro:  - On aricept 5mg  - Alert and oriented x 3  - No further issues    HEENT:   - No active issues    Cardiovascular:    Chest pain  - Loaded with ASA and Cangrelor infusion and heparin gtt as per ACS protocol 9/2/2020. Dc'd this yesterday  - HsT 16-->15-->15, remained stable  - No need for urgent cath at this point  - TTE done yesterday, EF 62%  - Blood pressure controlled today, consider stress test on sunday  - Nitro drip dc'd overnight    HTN:  - Nitro drip dc'd last night  - Continue Hydralazine 75mg  - Continue Amlodipine 10 mg  - Continue Losartan 50mg  - Continue HCTZ 25mg      Cholesterol:  - Continue Lipitor 80mg  - Lipid panel reviewed    Respiratory:  - No active issues today  - Satting 95% on RA    GI:  - No active issues  - Start DASH Diet    Renal:  - SCr: 0.73  - Net negative 342    ID:  - Patient afebrile  - Covid negative  - No active issues  - Urinalysis negative    DVT prophylaxis:  - Heparin SQ 70y female history of ?CAD, HTN, HLD, LBBB, AIHA, sinus bradycardia, pre-DM presents to the ED with severe HTN and chest pain. EKG w/ LBBB and ST changes.    Plan:    Neuro:  - On aricept 5mg  - Alert and oriented x 3  - No further issues    HEENT:   - No active issues    Cardiovascular:    Chest pain  - Loaded with ASA and Cangrelor infusion and heparin gtt as per ACS protocol 9/2/2020. Dc'd this yesterday  - HsT 16-->15-->15, remained stable  - No need for urgent cath at this point  - TTE done yesterday, EF 62%  - Blood pressure controlled today, consider stress test on sunday  - Nitro drip dc'd overnight    HTN:  - Nitro drip dc'd last night  - Continue Hydralazine 75mg  - Continue Amlodipine 10 mg  - Continue Losartan 50mg  - Continue HCTZ 25mg      Cholesterol:  - Continue Lipitor 80mg  - Lipid panel reviewed    Respiratory:  - No active issues today  - Satting 95% on RA    GI:  - No active issues  - Start DASH Diet    Renal:  - SCr: 0.73  - Net negative 342    ID:  - Patient remains afebrile  - Covid negative  - No active issues  - Urinalysis negative    DVT prophylaxis:  - Heparin SQ 70y female history of ?CAD, HTN, HLD, LBBB, AIHA, sinus bradycardia, pre-DM presents to the ED with severe HTN and chest pain. EKG w/ LBBB and ST changes.    Plan:    Neuro:  - On aricept 5mg  - Alert and oriented x 3  - No further issues    HEENT:   - No active issues    Cardiovascular:    Chest pain  - Loaded with ASA and Cangrelor infusion and heparin gtt as per ACS protocol 9/2/2020. Dc'd this yesterday  - HsT 16-->15-->15, remained stable  - No need for urgent cath at this point  - TTE done yesterday, EF 62%  - Blood pressure controlled today, consider stress test on sunday  - Nitro drip dc'd overnight    HTN:  - Nitro drip dc'd last night  - Continue Hydralazine 75mg  - Continue Amlodipine 10 mg  - Continue Losartan 50mg  - Continue HCTZ 25mg      Cholesterol:  - Decrease Lipitor to 40mg  - Lipid panel reviewed    Respiratory:  - No active issues today  - Satting 95% on RA    GI:  - No active issues  - Start DASH Diet    Renal:  - SCr: 0.73  - Net negative 342    ID:  - Patient remains afebrile  - Covid negative  - No active issues  - Urinalysis negative    DVT prophylaxis:  - Heparin SQ

## 2020-09-04 NOTE — PROGRESS NOTE ADULT - SUBJECTIVE AND OBJECTIVE BOX
Subjective: no chest pain or sob; ROS otherwise negative.   	  MEDICATIONS:  MEDICATIONS  (STANDING):  amLODIPine   Tablet 10 milliGRAM(s) Oral daily  aspirin  chewable 81 milliGRAM(s) Oral daily  atorvastatin 40 milliGRAM(s) Oral at bedtime  chlorhexidine 4% Liquid 1 Application(s) Topical <User Schedule>  heparin   Injectable 5000 Unit(s) SubCutaneous every 12 hours  hydrALAZINE 75 milliGRAM(s) Oral three times a day  hydrochlorothiazide 25 milliGRAM(s) Oral daily  influenza   Vaccine 0.5 milliLiter(s) IntraMuscular once  losartan 50 milliGRAM(s) Oral daily  melatonin 3 milliGRAM(s) Oral at bedtime  pantoprazole    Tablet 40 milliGRAM(s) Oral before breakfast      LABS:	 	    CARDIAC MARKERS:  CARDIAC MARKERS ( 03 Sep 2020 06:07 )  x     / x     / 55 u/L / 1.80 ng/mL / x      CARDIAC MARKERS ( 03 Sep 2020 01:08 )  x     / x     / 63 u/L / 1.82 ng/mL / x      CARDIAC MARKERS ( 02 Sep 2020 21:52 )  x     / x     / 83 u/L / 1.90 ng/mL / x                                    12.0   10.70 )-----------( 253      ( 04 Sep 2020 04:40 )             37.2     09-04    139  |  100  |  12  ----------------------------<  114<H>  3.3<L>   |  25  |  0.73    Ca    9.1      04 Sep 2020 04:40  Phos  3.6     09-04  Mg     2.0     09-04    TPro  6.7  /  Alb  3.6  /  TBili  0.8  /  DBili  x   /  AST  12  /  ALT  10  /  AlkPhos  93  09-03    proBNP:   Lipid Profile:   HgA1c:   TSH:       PHYSICAL EXAM:  T(C): 36.7 (09-04-20 @ 12:22), Max: 37.3 (09-04-20 @ 00:00)  HR: 55 (09-04-20 @ 11:53) (50 - 74)  BP: 145/54 (09-04-20 @ 11:53) (139/75 - 169/63)  RR: 19 (09-04-20 @ 11:53) (14 - 33)  SpO2: 93% (09-04-20 @ 11:53) (91% - 97%)  Wt(kg): --  I&O's Summary    03 Sep 2020 07:01  -  04 Sep 2020 07:00  --------------------------------------------------------  IN: 1328 mL / OUT: 1670 mL / NET: -342 mL          Appearance: Normal	  HEENT:   Normal oral mucosa, PERRL, EOMI	  Lymphatic: No lymphadenopathy , no edema  Cardiovascular: Normal S1 S2, No JVD, No murmurs , Peripheral pulses palpable 2+ bilaterally  Respiratory: Lungs clear to auscultation, normal effort 	  Gastrointestinal:  Soft, Non-tender, + BS	  Skin: No rashes, No ecchymoses, No cyanosis, warm to touch  Musculoskeletal: Normal range of motion, normal strength  Psychiatry:  Mood & affect appropriate    TELEMETRY: SR	    ECG:  	  RADIOLOGY:   DIAGNOSTIC TESTING:  [ ] Echocardiogram: < from: Transthoracic Echocardiogram (09.03.20 @ 12:41) >  1. Mitral annular calcification, otherwise normal mitral  valve. Minimal mitral regurgitation.  2. Moderately dilated left atrium.  LA volume index = 44  cc/m2.  3. Mild concentric left ventricular hypertrophy.  4. Normal left ventricular systolic function. No segmental  wall motion abnormalities.  5. Mild diastolic dysfunction (Stage I).  6. Normal right ventricular size and function.    < end of copied text >    [ ]  Catheterization:  [ ] Stress Test:    OTHER: 	      ASSESSMENT/PLAN: 70y Female HTN HLD LBBB admitted with HTN urgency and chest pain    -BP has improved  -continue with oral antihypertensive medications  -TTE with normal LV function  -Check NST to rule out CAD  -Further workup pending above  -Continue with supportive care per CCU     Jc Razo MD, FACC

## 2020-09-04 NOTE — PROGRESS NOTE ADULT - SUBJECTIVE AND OBJECTIVE BOX
Trevin Ca NP  CCU Service  Cardiology   Spect: [id number] ([facility])     PATIENT: AUSTIN HAYNES, MRN: [id number]    CHIEF COMPLAINT: Patient is a 70y old  Female who presents with a chief complaint of chest pain (03 Sep 2020 15:18)      INTERVAL HISTORY/OVERNIGHT EVENTS: Nitro drip dc'd overnight. A-line removed overnight. Had one episode of vomiting in the evening. Feels better this morning. Denies nausea, vomiting, chest pain, back pain, sob. Able to ambulate to bathroom without assistance    REVIEW OF SYSTEMS:  Constitutional:     [ ] negative [ ] fevers [ ] chills [ ] weight loss [ ] weight gain  HEENT:                  [ ] negative [ ] dry eyes [ ] eye irritation [ ] postnasal drip [ ] nasal congestion  CV:                         [ ] negative  [ ] chest pain [ ] orthopnea [ ] palpitations [ ] murmur  Resp:                     [ ] negative [ ] cough [ ] shortness of breath [ ] dyspnea [ ] wheezing [ ] sputum [ ] hemoptysis  GI:                          [ ] negative [ ] nausea [ ] vomiting [ ] diarrhea [ ] constipation [ ] abd pain [ ] dysphagia   :                        [ ] negative [ ] dysuria [ ] nocturia [ ] hematuria [ ] increased urinary frequency  Musculoskeletal: [ ] negative [ ] back pain [ ] myalgias [ ] arthralgias [ ] fracture  Skin:                       [ ] negative [ ] rash [ ] itch  Neurological:        [ ] negative [ ] headache [ ] dizziness [ ] syncope [ ] weakness [ ] numbness  Psychiatric:           [ ] negative [ ] anxiety [ ] depression  Endocrine:            [ ] negative [ ] diabetes [ ] thyroid problem  Heme/Lymph:      [ ] negative [ ] anemia [ ] bleeding problem  Allergic/Immune: [ ] negative [ ] itchy eyes [ ] nasal discharge [ ] hives [ ] angioedema    [x] All other systems negative  [ ] Unable to assess ROS because ________.    MEDICATIONS:  MEDICATIONS  (STANDING):  amLODIPine   Tablet 10 milliGRAM(s) Oral daily  aspirin  chewable 81 milliGRAM(s) Oral daily  atorvastatin 80 milliGRAM(s) Oral at bedtime  chlorhexidine 4% Liquid 1 Application(s) Topical <User Schedule>  heparin   Injectable 5000 Unit(s) SubCutaneous every 12 hours  hydrALAZINE 75 milliGRAM(s) Oral three times a day  hydrochlorothiazide 25 milliGRAM(s) Oral daily  influenza   Vaccine 0.5 milliLiter(s) IntraMuscular once  losartan 50 milliGRAM(s) Oral daily  melatonin 3 milliGRAM(s) Oral at bedtime  pantoprazole    Tablet 40 milliGRAM(s) Oral before breakfast  potassium chloride    Tablet ER 40 milliEquivalent(s) Oral every 4 hours    MEDICATIONS  (PRN):  acetaminophen   Tablet .. 650 milliGRAM(s) Oral every 6 hours PRN Mild Pain (1 - 3)      ALLERGIES: No known Allergies      OBJECTIVE:  ICU Vital Signs Last 24 Hrs  T(C): 37.3 (04 Sep 2020 04:00), Max: 37.3 (04 Sep 2020 00:00)  T(F): 99.2 (04 Sep 2020 04:00), Max: 99.2 (04 Sep 2020 00:00)  HR: 60 (04 Sep 2020 07:00) (43 - 74)  BP: 141/58 (04 Sep 2020 07:00) (139/75 - 190/66)  BP(mean): 79 (04 Sep 2020 07:00) (71 - 96)  ABP: 171/64 (04 Sep 2020 04:00) (138/57 - 215/75)  ABP(mean): 98 (04 Sep 2020 04:00) (81 - 119)  RR: 25 (04 Sep 2020 07:00) (13 - 25)  SpO2: 93% (04 Sep 2020 07:00) (92% - 97%)          I&O's Summary    03 Sep 2020 07:01  -  04 Sep 2020 07:00  --------------------------------------------------------  IN: 1328 mL / OUT: 1670 mL / NET: -342 mL      Daily     Daily Weight in k.2 (04 Sep 2020 04:00)    PHYSICAL EXAMINATION:  General: 69 y/o female sitting up in bed, answering questions, comfortable, in no acute distress  Respiratory: CTAB, normal respiratory effort, no coughing, wheezes, crackles, or rales.  CV: RRR, S1S2, no murmurs, rubs or gallops. No JVD. Distal pulses intact.  Abdominal: Soft, nontender, nondistended, no rebound or guarding, normal bowel sounds.  Neurology: AOx3, no focal neuro defects, DOUGLAS x 4.  Extremities: No pitting edema, + Peripheral pulses.      LABS:                          12.0   10.70 )-----------( 253      ( 04 Sep 2020 04:40 )             37.2     09-04    139  |  100  |  12  ----------------------------<  114<H>  3.3<L>   |  25  |  0.73    Ca    9.1      04 Sep 2020 04:40  Phos  3.6     09-04  Mg     2.0     -04    TPro  6.7  /  Alb  3.6  /  TBili  0.8  /  DBili  x   /  AST  12  /  ALT  10  /  AlkPhos  93  09-03    LIVER FUNCTIONS - ( 03 Sep 2020 06:07 )  Alb: 3.6 g/dL / Pro: 6.7 g/dL / ALK PHOS: 93 u/L / ALT: 10 u/L / AST: 12 u/L / GGT: x           PT/INR - ( 02 Sep 2020 21:48 )   PT: 11.5 SEC;   INR: 1.00          PTT - ( 04 Sep 2020 04:40 )  PTT:31.1 SEC    CARDIAC MARKERS: Reviewed    Urinalysis Basic - ( 03 Sep 2020 01:08 )  Color: COLORLESS / Appearance: CLEAR / S.007 / pH: 7.5  Gluc: NEGATIVE / Ketone: NEGATIVE  / Bili: NEGATIVE / Urobili: NORMAL   Blood: NEGATIVE / Protein: NEGATIVE / Nitrite: NEGATIVE   Leuk Esterase: NEGATIVE / RBC: 0-2 / WBC 0-2   Sq Epi: x / Non Sq Epi: x / Bacteria: x        TELEMETRY: No overnight events    EKG: Sinus rhythm, left axis deviation, LBBB    IMAGING:   Transthoracic Echocardiogram (20   CONCLUSIONS:  1. Mitral annular calcification, otherwise normal mitral  valve. Minimal mitral regurgitation.  2. Moderately dilated left atrium.  LA volume index = 44  cc/m2.  3. Mild concentric left ventricular hypertrophy.  4. Normal left ventricular systolic function. No segmental  wall motion abnormalities.  5. Mild diastolic dysfunction (Stage I).  6. Normal right ventricular size and function.    EF: 62%

## 2020-09-04 NOTE — PROGRESS NOTE ADULT - SUBJECTIVE AND OBJECTIVE BOX
HISTORY OF PRESENT ILLNESS:   Ms Capone follows with Dr Vital at [facility].  She is a 69yo woman with hx CAD and uncontrolled severe hypertension.  She speaks Kazakh.  History with aide of  at bedside.    She presented with chest pain for a few days duration, and admitted to the CCU for management of hypertensive emergency with SBP >220mmHg.  She was bradycardic at that time with sinus heartrate in the 40s. She has a new LBBB compared to EKG from 1/2020 with narrow QRS complex.    She states she is not having any palpitations, orthopnea, LE swelling, shortness of breath.  Can walk a few blocks without D.O.E..  Has been aware of slow heartrate for a few years.  No angina at this time.  No history of lightheadedness/near-syncope or syncope. A 10 pt ROS is negative.    9/4- no interval significant changes. stable sinus radha 50's on telemetry.  no SVT or VT.  BP is improving on nitro drip.  no angina or palpitations.  reviewed echocardiogram's reassuring results w/ patient.     acetaminophen   Tablet .. 650 milliGRAM(s) Oral every 6 hours PRN  amLODIPine   Tablet 10 milliGRAM(s) Oral daily  aspirin  chewable 81 milliGRAM(s) Oral daily  atorvastatin 40 milliGRAM(s) Oral at bedtime  chlorhexidine 4% Liquid 1 Application(s) Topical <User Schedule>  heparin   Injectable 5000 Unit(s) SubCutaneous every 12 hours  hydrALAZINE 75 milliGRAM(s) Oral three times a day  hydrochlorothiazide 25 milliGRAM(s) Oral daily  influenza   Vaccine 0.5 milliLiter(s) IntraMuscular once  losartan 50 milliGRAM(s) Oral daily  melatonin 3 milliGRAM(s) Oral at bedtime  pantoprazole    Tablet 40 milliGRAM(s) Oral before breakfast                            12.0   10.70 )-----------( 253      ( 04 Sep 2020 04:40 )             37.2       09-04    139  |  100  |  12  ----------------------------<  114<H>  3.3<L>   |  25  |  0.73    Ca    9.1      04 Sep 2020 04:40  Phos  3.6     09-04  Mg     2.0     09-04    TPro  6.7  /  Alb  3.6  /  TBili  0.8  /  DBili  x   /  AST  12  /  ALT  10  /  AlkPhos  93  09-03    CARDIAC MARKERS ( 03 Sep 2020 06:07 )  x     / x     / 55 u/L / 1.80 ng/mL / x      CARDIAC MARKERS ( 03 Sep 2020 01:08 )  x     / x     / 63 u/L / 1.82 ng/mL / x      CARDIAC MARKERS ( 02 Sep 2020 21:52 )  x     / x     / 83 u/L / 1.90 ng/mL / x          T(C): 37.3 (09-04-20 @ 07:53), Max: 37.3 (09-04-20 @ 00:00)  HR: 55 (09-04-20 @ 11:53) (50 - 74)  BP: 145/54 (09-04-20 @ 11:53) (139/75 - 169/63)  RR: 19 (09-04-20 @ 11:53) (13 - 33)  SpO2: 93% (09-04-20 @ 11:53) (91% - 97%)  Wt(kg): --    I&O's Summary    03 Sep 2020 07:01  -  04 Sep 2020 07:00  --------------------------------------------------------  IN: 1328 mL / OUT: 1670 mL / NET: -342 mL      Appearance: Overweight  female in no acute distress	  HEENT:   Normal oral mucosa, PERRL, EOMI	  Lymphatic: No lymphadenopathy , no edema  Cardiovascular: Normal S1 S2, No JVD, No murmurs , Peripheral pulses palpable 2+ bilaterally  Respiratory: Lungs clear to auscultation, normal effort 	  Gastrointestinal:  Soft, Non-tender, + BS	  Skin: No rashes, No ecchymoses, No cyanosis, warm to touch  Musculoskeletal: Normal range of motion, normal strength  Psychiatry:  Mood & affect appropriate    TELEMETRY: sinus radha 40s, to sinus rhythm 70-80.	    ECG:  sinus radha 40s, normal FL, LBBB 140ms.  Echo: mild LVH, normal EF despite hypertensive condition, no severe valve dysfunction.      ASSESSMENT/PLAN: 	70y Female with CAD, pre-diabetes, HTN, presents with atypical chest pain and hypertensive emergency.    Echo with normal EF.  Oral vasodilators have been started by CCU, BP is down to the 150's.  LBBB is new compared to last EKG with narrow QRS in 1/2020.  Normal FL suggests conduction defect below the bundle of His.  Will observe heartrate to see maximum rate of conduction, and to look for any alternating LBBB/RBBB morphology.  Prior to discharge, I would like to see an ambulatory EKG (whether in hallway or formally on treadmill) to assess for chronotropic competence / infra-Hisian block at higher heartrates.    Will follow.    Phong Starks M.D.  Cardiac Electrophysiology    office [phone]  pager [phone]

## 2020-09-05 ENCOUNTER — TRANSCRIPTION ENCOUNTER (OUTPATIENT)
Age: 70
End: 2020-09-05

## 2020-09-05 DIAGNOSIS — I16.0 HYPERTENSIVE URGENCY: ICD-10-CM

## 2020-09-05 DIAGNOSIS — Z02.9 ENCOUNTER FOR ADMINISTRATIVE EXAMINATIONS, UNSPECIFIED: ICD-10-CM

## 2020-09-05 LAB
ANION GAP SERPL CALC-SCNC: 12 MMO/L — SIGNIFICANT CHANGE UP (ref 7–14)
BUN SERPL-MCNC: 14 MG/DL — SIGNIFICANT CHANGE UP (ref 7–23)
CALCIUM SERPL-MCNC: 9.2 MG/DL — SIGNIFICANT CHANGE UP (ref 8.4–10.5)
CHLORIDE SERPL-SCNC: 105 MMOL/L — SIGNIFICANT CHANGE UP (ref 98–107)
CO2 SERPL-SCNC: 23 MMOL/L — SIGNIFICANT CHANGE UP (ref 22–31)
CREAT SERPL-MCNC: 0.72 MG/DL — SIGNIFICANT CHANGE UP (ref 0.5–1.3)
GLUCOSE SERPL-MCNC: 91 MG/DL — SIGNIFICANT CHANGE UP (ref 70–99)
HCT VFR BLD CALC: 41.1 % — SIGNIFICANT CHANGE UP (ref 34.5–45)
HGB BLD-MCNC: 12.6 G/DL — SIGNIFICANT CHANGE UP (ref 11.5–15.5)
MAGNESIUM SERPL-MCNC: 2 MG/DL — SIGNIFICANT CHANGE UP (ref 1.6–2.6)
MCHC RBC-ENTMCNC: 26.5 PG — LOW (ref 27–34)
MCHC RBC-ENTMCNC: 30.7 % — LOW (ref 32–36)
MCV RBC AUTO: 86.5 FL — SIGNIFICANT CHANGE UP (ref 80–100)
NRBC # FLD: 0 K/UL — SIGNIFICANT CHANGE UP (ref 0–0)
PLATELET # BLD AUTO: 266 K/UL — SIGNIFICANT CHANGE UP (ref 150–400)
PMV BLD: 9.5 FL — SIGNIFICANT CHANGE UP (ref 7–13)
POTASSIUM SERPL-MCNC: 4 MMOL/L — SIGNIFICANT CHANGE UP (ref 3.5–5.3)
POTASSIUM SERPL-SCNC: 4 MMOL/L — SIGNIFICANT CHANGE UP (ref 3.5–5.3)
RBC # BLD: 4.75 M/UL — SIGNIFICANT CHANGE UP (ref 3.8–5.2)
RBC # FLD: 13.7 % — SIGNIFICANT CHANGE UP (ref 10.3–14.5)
SODIUM SERPL-SCNC: 140 MMOL/L — SIGNIFICANT CHANGE UP (ref 135–145)
WBC # BLD: 9.35 K/UL — SIGNIFICANT CHANGE UP (ref 3.8–10.5)
WBC # FLD AUTO: 9.35 K/UL — SIGNIFICANT CHANGE UP (ref 3.8–10.5)

## 2020-09-05 RX ORDER — INFLUENZA VIRUS VACCINE 15; 15; 15; 15 UG/.5ML; UG/.5ML; UG/.5ML; UG/.5ML
0.7 SUSPENSION INTRAMUSCULAR ONCE
Refills: 0 | Status: DISCONTINUED | OUTPATIENT
Start: 2020-09-05 | End: 2020-09-09

## 2020-09-05 RX ORDER — LOSARTAN POTASSIUM 100 MG/1
75 TABLET, FILM COATED ORAL DAILY
Refills: 0 | Status: DISCONTINUED | OUTPATIENT
Start: 2020-09-06 | End: 2020-09-06

## 2020-09-05 RX ORDER — LOSARTAN POTASSIUM 100 MG/1
25 TABLET, FILM COATED ORAL ONCE
Refills: 0 | Status: COMPLETED | OUTPATIENT
Start: 2020-09-05 | End: 2020-09-05

## 2020-09-05 RX ORDER — SENNA PLUS 8.6 MG/1
2 TABLET ORAL AT BEDTIME
Refills: 0 | Status: DISCONTINUED | OUTPATIENT
Start: 2020-09-05 | End: 2020-09-09

## 2020-09-05 RX ORDER — POLYETHYLENE GLYCOL 3350 17 G/17G
17 POWDER, FOR SOLUTION ORAL DAILY
Refills: 0 | Status: DISCONTINUED | OUTPATIENT
Start: 2020-09-05 | End: 2020-09-09

## 2020-09-05 RX ADMIN — LOSARTAN POTASSIUM 25 MILLIGRAM(S): 100 TABLET, FILM COATED ORAL at 12:29

## 2020-09-05 RX ADMIN — Medication 650 MILLIGRAM(S): at 19:44

## 2020-09-05 RX ADMIN — Medication 3 MILLIGRAM(S): at 21:31

## 2020-09-05 RX ADMIN — LOSARTAN POTASSIUM 50 MILLIGRAM(S): 100 TABLET, FILM COATED ORAL at 05:48

## 2020-09-05 RX ADMIN — Medication 650 MILLIGRAM(S): at 20:01

## 2020-09-05 RX ADMIN — POLYETHYLENE GLYCOL 3350 17 GRAM(S): 17 POWDER, FOR SOLUTION ORAL at 12:29

## 2020-09-05 RX ADMIN — SENNA PLUS 2 TABLET(S): 8.6 TABLET ORAL at 21:31

## 2020-09-05 RX ADMIN — Medication 25 MILLIGRAM(S): at 12:29

## 2020-09-05 RX ADMIN — PANTOPRAZOLE SODIUM 40 MILLIGRAM(S): 20 TABLET, DELAYED RELEASE ORAL at 05:48

## 2020-09-05 RX ADMIN — HEPARIN SODIUM 5000 UNIT(S): 5000 INJECTION INTRAVENOUS; SUBCUTANEOUS at 17:31

## 2020-09-05 RX ADMIN — AMLODIPINE BESYLATE 10 MILLIGRAM(S): 2.5 TABLET ORAL at 12:28

## 2020-09-05 RX ADMIN — Medication 81 MILLIGRAM(S): at 12:28

## 2020-09-05 RX ADMIN — CHLORHEXIDINE GLUCONATE 1 APPLICATION(S): 213 SOLUTION TOPICAL at 12:29

## 2020-09-05 RX ADMIN — HEPARIN SODIUM 5000 UNIT(S): 5000 INJECTION INTRAVENOUS; SUBCUTANEOUS at 05:48

## 2020-09-05 RX ADMIN — ATORVASTATIN CALCIUM 40 MILLIGRAM(S): 80 TABLET, FILM COATED ORAL at 21:31

## 2020-09-05 NOTE — DISCHARGE NOTE PROVIDER - HOSPITAL COURSE
70F with HTN, HLD, pre DM, ? CAD but with normal stress test 2017, and sinus radha presents with chest pain X several days with new LBBB in setting of HTN urgency with SBP > 240. Initial concern for STEMI based on EKG but did not meet Sgarbossa criteria. Started on IV NTG for BP control and admitted to CCU. IV NTG was tapered off and po antihypertensives were started. Cardiac enzymes were negative. ECHO with normal LV function and plan is for nuclear stress test. Patient also with asymptomatic sinus bradycardia but with appropriate HR response with activity. On home donazepil per med rec but not given here ? due to side effect of bradycardia. No evidence of confusion. EP recommending ambulatory EKG monitoring. 70F with HTN, HLD, pre DM, ? CAD but with normal stress test 2017, and sinus radha presents with chest pain X several days with new LBBB in setting of HTN urgency with SBP > 240. Initial concern for STEMI based on EKG but did not meet Sgarbossa criteria. Started on IV NTG for BP control and admitted to CCU. IV NTG was tapered off and po antihypertensives were started. Cardiac enzymes were negative. ECHO with normal LV function and plan is for nuclear stress test. Patient also with asymptomatic sinus bradycardia and appropriate HR response with activity. On home donazepil per med rec but not given here ? due to side effect of bradycardia. No evidence of confusion. EP recommending ambulatory EKG monitoring. 70F with HTN, HLD, pre DM, ? CAD but with normal stress test 2017, and sinus radha presents with chest pain X several days with new LBBB in setting of HTN urgency with SBP > 240. Initial concern for STEMI based on EKG but did not meet Sgarbossa criteria. Started on IV NTG for BP control and admitted to CCU. IV NTG was tapered off and po antihypertensives were started. Cardiac enzymes were negative. ECHO with normal LV function and plan is for nuclear stress test. Patient also with asymptomatic sinus bradycardia but appropriate HR response with activity. On home donazepil per med rec but not given here ? due to side effect of bradycardia. No evidence of confusion. EP recommending ambulatory EKG monitoring.

## 2020-09-05 NOTE — DISCHARGE NOTE PROVIDER - NSDCACTIVITY_GEN_ALL_CORE
Showering allowed/Stairs allowed/Walking - Outdoors allowed/No heavy lifting/straining/Bathing allowed/Walking - Indoors allowed

## 2020-09-05 NOTE — PROGRESS NOTE ADULT - PROBLEM SELECTOR PLAN 3
History unclear, nuclear stress test 12/2017 normal, cardiac enzymes negative and no further chest pain once BP controlled. ECHO with normal LV function. Continue ASA and lipitor. Plan for nuclear stress test per private cardiologist recommendations.

## 2020-09-05 NOTE — DISCHARGE NOTE PROVIDER - CARE PROVIDER_API CALL
Jc Razo  CARDIOVASCULAR DISEASE  [address] [place] [address]  Phone: [phone]  Fax: [phone]  Follow Up Time: 2 weeks Jc Razo  CARDIOVASCULAR DISEASE  [address] [place] [address]  Phone: [phone]  Fax: [phone]  Follow Up Time: 2 weeks    primary care provider,   Follow up with your primary care provider 1-2 weeks  Phone: (   )    -  Fax: (   )    -  Follow Up Time: Jc Razo  CARDIOVASCULAR DISEASE  [address] [place] [address]  Phone: [phone]  Fax: [phone]  Follow Up Time: 2 weeks    primary care provider,   Follow up with your primary care provider 1-2 weeks  Phone: (   )    -  Fax: (   )    -  Follow Up Time:     IRINEO WORKMAN  Cardiovascular Disease  [address] [place] [address]  Phone: [phone]  Fax: [phone]  Established Patient  Scheduled Appointment: 09/25/2020 11:45 AM

## 2020-09-05 NOTE — DISCHARGE NOTE PROVIDER - PROVIDER TOKENS
PROVIDER:[TOKEN:[[id number]],FOLLOWUP:[2 weeks]] PROVIDER:[TOKEN:[[id number]],FOLLOWUP:[2 weeks]],FREE:[LAST:[primary care provider],PHONE:[(   )    -],FAX:[(   )    -],ADDRESS:[Follow up with your primary care provider 1-2 weeks]] PROVIDER:[TOKEN:[[id number]],FOLLOWUP:[2 weeks]],FREE:[LAST:[primary care provider],PHONE:[(   )    -],FAX:[(   )    -],ADDRESS:[Follow up with your primary care provider 1-2 weeks]],PROVIDER:[TOKEN:[[id number]],SCHEDULEDAPPT:[09/25/2020],SCHEDULEDAPPTTIME:[11:45 AM],ESTABLISHEDPATIENT:[T]]

## 2020-09-05 NOTE — PROGRESS NOTE ADULT - PROBLEM SELECTOR PLAN 1
BP better controlled on current regimen of amlodipine, losartan, and HCTZ. Hydralazine 50 mg po TID ordered as prn for SBP >140 but patient has not received any doses so far. Will discuss need vs. changing to standing dose. BP better controlled on current regimen of amlodipine, losartan, and HCTZ. Hydralazine 50 mg po TID ordered as prn for SBP >140 but patient has not received any doses so far. Will discuss need vs. changing to standing dose.  1993 Addendum: will discontinue prn Hydralazine and increase losartan to 75 mg po QD

## 2020-09-05 NOTE — PROGRESS NOTE ADULT - PROBLEM SELECTOR PLAN 2
HR 48-70. New LBBB on EKG. Holding AVN agents at this time. EP following and recommending observation and ambulatory EKG prior to discharge to assess AV conduction. HR 48-70. New LBBB on EKG. Holding AVN agents at this time. EP following and recommending observation and ambulatory EKG prior to discharge to assess AV conduction. [id number] Addendum: home med rec indicates donazepil at home but not receiving here ? R/T possible side effect of bradycardia. In addition patient OOB ambulating this am with appropriate HR response to activity noted.

## 2020-09-05 NOTE — DISCHARGE NOTE PROVIDER - NSDCMRMEDTOKEN_GEN_ALL_CORE_FT
aspirin 81 mg oral tablet, chewable: 1 tab(s) orally once a day  atorvastatin 20 mg oral tablet: 1 tab(s) orally once a day  candesartan 4 mg oral tablet: 1 tab(s) orally once a day  donepezil 5 mg oral tablet: 1 tab(s) orally once a day (at bedtime)  folic acid 1 mg oral tablet: 1 tab(s) orally once a day  hydrALAZINE 50 mg oral tablet: orally 3 times a day  omeprazole 20 mg oral delayed release capsule: 1 cap(s) orally once a day amLODIPine 10 mg oral tablet: 1 tab(s) orally once a day  aspirin 81 mg oral tablet, chewable: 1 tab(s) orally once a day  atorvastatin 20 mg oral tablet: 1 tab(s) orally once a day  candesartan 4 mg oral tablet: 1 tab(s) orally once a day  donepezil 5 mg oral tablet: 1 tab(s) orally once a day (at bedtime)  folic acid 1 mg oral tablet: 1 tab(s) orally once a day  hydrALAZINE 50 mg oral tablet: orally 3 times a day  omeprazole 20 mg oral delayed release capsule: 1 cap(s) orally once a day acetaminophen 325 mg oral tablet: 2 tab(s) orally every 6 hours, As needed, Mild Pain (1 - 3)  amLODIPine 10 mg oral tablet: 1 tab(s) orally once a day  aspirin 81 mg oral tablet, chewable: 1 tab(s) orally once a day  atorvastatin 40 mg oral tablet: 1 tab(s) orally once a day (at bedtime)  candesartan 4 mg oral tablet: 1 tab(s) orally once a day  donepezil 5 mg oral tablet: 1 tab(s) orally once a day (at bedtime)  folic acid 1 mg oral tablet: 1 tab(s) orally once a day  hydroCHLOROthiazide 25 mg oral tablet: 1 tab(s) orally once a day  melatonin 3 mg oral tablet: 1 tab(s) orally once a day (at bedtime)  pantoprazole 40 mg oral delayed release tablet: 1 tab(s) orally once a day (before a meal)  polyethylene glycol 3350 oral powder for reconstitution: 17 gram(s) orally once a day, As Needed  senna oral tablet: 2 tab(s) orally once a day (at bedtime), As Needed

## 2020-09-05 NOTE — DISCHARGE NOTE PROVIDER - NSDCCPCAREPLAN_GEN_ALL_CORE_FT
PRINCIPAL DISCHARGE DIAGNOSIS  Diagnosis: Chest pain, unspecified type  Assessment and Plan of Treatment: you were evaluated for chest discomfort with a cardiac catheterization that was without major findings requiring percutaneous stent placement.  Follow up with your primary care physician for further monitoring in 1-2 weeks. Please call to arrange appointment.  Low cholesterol diet. Salt restriction. 1800Kcal diabetic diet with carb restriction.

## 2020-09-05 NOTE — PROGRESS NOTE ADULT - ASSESSMENT
70F with HTN, HLD, ? CAD, and pre- DM presents with chest pain and new LBBB in setting of HTN urgency with 's now improved with BP management, cardiac enzymes negative with normal LV function. Course also c/b asymptomatic sinus bradycardia.

## 2020-09-05 NOTE — PROGRESS NOTE ADULT - SUBJECTIVE AND OBJECTIVE BOX
Pt seen laying in bed flat, off o2,   offers no complaints, no chest pain or sob on exam   no events overnight  no issues while ambulating to bathroom as per staff       acetaminophen   Tablet .. 650 milliGRAM(s) Oral every 6 hours PRN  amLODIPine   Tablet 10 milliGRAM(s) Oral daily  aspirin  chewable 81 milliGRAM(s) Oral daily  atorvastatin 40 milliGRAM(s) Oral at bedtime  chlorhexidine 4% Liquid 1 Application(s) Topical <User Schedule>  heparin   Injectable 5000 Unit(s) SubCutaneous every 12 hours  hydrALAZINE 50 milliGRAM(s) Oral three times a day PRN  hydrochlorothiazide 25 milliGRAM(s) Oral daily  influenza   Vaccine 0.5 milliLiter(s) IntraMuscular once  losartan 50 milliGRAM(s) Oral daily  melatonin 3 milliGRAM(s) Oral at bedtime  pantoprazole    Tablet 40 milliGRAM(s) Oral before breakfast  polyethylene glycol 3350 17 Gram(s) Oral daily  senna 2 Tablet(s) Oral at bedtime                            12.6   9.35  )-----------( 266      ( 05 Sep 2020 05:45 )             41.1       Hemoglobin: 12.6 g/dL (09-05 @ 05:45)  Hemoglobin: 12.0 g/dL (09-04 @ 04:40)  Hemoglobin: 11.2 g/dL (09-03 @ 06:07)  Hemoglobin: 11.8 g/dL (09-02 @ 21:38)      09-05    140  |  105  |  14  ----------------------------<  91  4.0   |  23  |  0.72    Ca    9.2      05 Sep 2020 05:45  Phos  3.6     09-04  Mg     2.0     09-05      Creatinine Trend: 0.72<--, 0.73<--, 0.53<--, 0.71<--    COAGS:     CARDIAC MARKERS ( 03 Sep 2020 06:07 )  x     / x     / 55 u/L / 1.80 ng/mL / x      CARDIAC MARKERS ( 03 Sep 2020 01:08 )  x     / x     / 63 u/L / 1.82 ng/mL / x      CARDIAC MARKERS ( 02 Sep 2020 21:52 )  x     / x     / 83 u/L / 1.90 ng/mL / x            T(C): 36.8 (09-05-20 @ 04:00), Max: 37.3 (09-04-20 @ 07:53)  HR: 51 (09-05-20 @ 07:00) (48 - 79)  BP: 145/44 (09-05-20 @ 07:00) (127/44 - 173/66)  RR: 15 (09-05-20 @ 07:00) (13 - 33)  SpO2: 96% (09-05-20 @ 07:00) (91% - 98%)  Wt(kg): --    I&O's Summary    04 Sep 2020 07:01  -  05 Sep 2020 07:00  --------------------------------------------------------  IN: 0 mL / OUT: 350 mL / NET: -350 mL      Appearance: Normal	  HEENT:   Normal oral mucosa, PERRL, EOMI	  Lymphatic: No lymphadenopathy , no edema  Cardiovascular: Normal S1 S2, No JVD, No murmurs , Peripheral pulses palpable 2+ bilaterally  Respiratory: Lungs clear to auscultation, normal effort 	  Gastrointestinal:  Soft, Non-tender, + BS	  Skin: No rashes, No ecchymoses, No cyanosis, warm to touch  Musculoskeletal: Normal range of motion, normal strength  Psychiatry:  Mood & affect appropriate    TELEMETRY: SR	    ECG:  	  RADIOLOGY:   DIAGNOSTIC TESTING:  [ ] Echocardiogram: < from: Transthoracic Echocardiogram (09.03.20 @ 12:41) >  1. Mitral annular calcification, otherwise normal mitral  valve. Minimal mitral regurgitation.  2. Moderately dilated left atrium.  LA volume index = 44  cc/m2.  3. Mild concentric left ventricular hypertrophy.  4. Normal left ventricular systolic function. No segmental  wall motion abnormalities.  5. Mild diastolic dysfunction (Stage I).  6. Normal right ventricular size and function.    < end of copied text >    [ ]  Catheterization:  [ ] Stress Test:    OTHER: 	      ASSESSMENT/PLAN: 70y Female HTN HLD LBBB admitted with HTN urgency and chest pain    - NST pending   - Cont asa, statin   -BP well controlled overnight    -TTE with normal LV function  -Check NST to rule out CAD  -Further workup pending above  -Continue with supportive care per CCU , can be transferred out of ccu to tele

## 2020-09-05 NOTE — PROGRESS NOTE ADULT - SUBJECTIVE AND OBJECTIVE BOX
Subjective/Objective: Resting in bed, feels "much better" today. c/o constipation.     MEDICATIONS  (STANDING):  amLODIPine   Tablet 10 milliGRAM(s) Oral daily  aspirin  chewable 81 milliGRAM(s) Oral daily  atorvastatin 40 milliGRAM(s) Oral at bedtime  chlorhexidine 4% Liquid 1 Application(s) Topical <User Schedule>  heparin   Injectable 5000 Unit(s) SubCutaneous every 12 hours  hydrochlorothiazide 25 milliGRAM(s) Oral daily  influenza   Vaccine 0.5 milliLiter(s) IntraMuscular once  losartan 50 milliGRAM(s) Oral daily  melatonin 3 milliGRAM(s) Oral at bedtime  pantoprazole    Tablet 40 milliGRAM(s) Oral before breakfast  polyethylene glycol 3350 17 Gram(s) Oral daily  senna 2 Tablet(s) Oral at bedtime    MEDICATIONS  (PRN):  acetaminophen   Tablet .. 650 milliGRAM(s) Oral every 6 hours PRN Mild Pain (1 - 3)  hydrALAZINE 50 milliGRAM(s) Oral three times a day PRN administer for sbp>140          Vital Signs Last 24 Hrs  T(C): 36.8 (05 Sep 2020 04:00), Max: 37.3 (04 Sep 2020 07:53)  T(F): 98.2 (05 Sep 2020 04:00), Max: 99.1 (04 Sep 2020 07:53)  HR: 51 (05 Sep 2020 07:00) (48 - 79)  BP: 145/44 (05 Sep 2020 07:00) (127/44 - 173/66)  BP(mean): 71 (05 Sep 2020 07:00) (65 - 103)  RR: 15 (05 Sep 2020 07:00) (13 - 33)  SpO2: 96% (05 Sep 2020 07:00) (91% - 98%)  I&O's Detail    04 Sep 2020 07:01  -  05 Sep 2020 07:00  --------------------------------------------------------  IN:  Total IN: 0 mL    OUT:    Voided: 350 mL  Total OUT: 350 mL    Total NET: -350 mL    PHYSICAL EXAM  GEN: NAD, skin W & D  RESP: CTA ant  CV: nl S1S2, no M/R/C  GI: soft, NT/ND, BS +  EXT: no C/C/E  NEURO: A & O X 3      EKG/ TELEM: NSR/sinus bradycardia occ VPC    LABS:                          12.6   9.35  )-----------( 266      ( 05 Sep 2020 05:45 )             41.1     PTT - ( 04 Sep 2020 04:40 )  PTT:31.1 SEC  05 Sep 2020 05:45    140    |  105    |  14     ----------------------------<  91     4.0     |  23     |  0.72     04 Sep 2020 04:40    139    |  100    |  12     ----------------------------<  114<H>  3.3<L>   |  25     |  0.73     Ca    9.2        05 Sep 2020 05:45  Ca    9.1        04 Sep 2020 04:40  Phos  3.6       04 Sep 2020 04:40  Mg     2.0       05 Sep 2020 05:45  Mg     2.0       04 Sep 2020 04:40          Troponin T, High Sensitivity: 16 ng/L (09-03-20 @ 06:07)  Troponin T, High Sensitivity: 15 ng/L (09-03-20 @ 01:08)  Troponin T, High Sensitivity: 16 ng/L (09-02-20 @ 21:52)    Creatine Kinase, Serum: 55 u/L (09-03-20 @ 06:07)  Creatine Kinase, Serum: 63 u/L (09-03-20 @ 01:08)  Creatine Kinase, Serum: 83 u/L (09-02-20 @ 21:52)    CKMB: 1.80 ng/mL (09-03-20 @ 06:07)  CKMB: 1.82 ng/mL (09-03-20 @ 01:08)  CKMB: 1.90 ng/mL (09-02-20 @ 21:52)

## 2020-09-06 LAB
ANION GAP SERPL CALC-SCNC: 16 MMO/L — HIGH (ref 7–14)
BUN SERPL-MCNC: 19 MG/DL — SIGNIFICANT CHANGE UP (ref 7–23)
CALCIUM SERPL-MCNC: 10 MG/DL — SIGNIFICANT CHANGE UP (ref 8.4–10.5)
CHLORIDE SERPL-SCNC: 99 MMOL/L — SIGNIFICANT CHANGE UP (ref 98–107)
CO2 SERPL-SCNC: 25 MMOL/L — SIGNIFICANT CHANGE UP (ref 22–31)
CREAT SERPL-MCNC: 0.71 MG/DL — SIGNIFICANT CHANGE UP (ref 0.5–1.3)
GLUCOSE SERPL-MCNC: 111 MG/DL — HIGH (ref 70–99)
HCT VFR BLD CALC: 42.2 % — SIGNIFICANT CHANGE UP (ref 34.5–45)
HGB BLD-MCNC: 13.6 G/DL — SIGNIFICANT CHANGE UP (ref 11.5–15.5)
MAGNESIUM SERPL-MCNC: 2 MG/DL — SIGNIFICANT CHANGE UP (ref 1.6–2.6)
MCHC RBC-ENTMCNC: 27.5 PG — SIGNIFICANT CHANGE UP (ref 27–34)
MCHC RBC-ENTMCNC: 32.2 % — SIGNIFICANT CHANGE UP (ref 32–36)
MCV RBC AUTO: 85.4 FL — SIGNIFICANT CHANGE UP (ref 80–100)
NRBC # FLD: 0 K/UL — SIGNIFICANT CHANGE UP (ref 0–0)
PHOSPHATE SERPL-MCNC: 4.1 MG/DL — SIGNIFICANT CHANGE UP (ref 2.5–4.5)
PLATELET # BLD AUTO: 299 K/UL — SIGNIFICANT CHANGE UP (ref 150–400)
PMV BLD: 9.8 FL — SIGNIFICANT CHANGE UP (ref 7–13)
POTASSIUM SERPL-MCNC: 4.1 MMOL/L — SIGNIFICANT CHANGE UP (ref 3.5–5.3)
POTASSIUM SERPL-SCNC: 4.1 MMOL/L — SIGNIFICANT CHANGE UP (ref 3.5–5.3)
RBC # BLD: 4.94 M/UL — SIGNIFICANT CHANGE UP (ref 3.8–5.2)
RBC # FLD: 13.6 % — SIGNIFICANT CHANGE UP (ref 10.3–14.5)
SODIUM SERPL-SCNC: 140 MMOL/L — SIGNIFICANT CHANGE UP (ref 135–145)
WBC # BLD: 10.54 K/UL — HIGH (ref 3.8–10.5)
WBC # FLD AUTO: 10.54 K/UL — HIGH (ref 3.8–10.5)

## 2020-09-06 PROCEDURE — 93016 CV STRESS TEST SUPVJ ONLY: CPT | Mod: GC

## 2020-09-06 PROCEDURE — 78452 HT MUSCLE IMAGE SPECT MULT: CPT | Mod: 26

## 2020-09-06 PROCEDURE — 93018 CV STRESS TEST I&R ONLY: CPT | Mod: GC

## 2020-09-06 RX ORDER — LOSARTAN POTASSIUM 100 MG/1
100 TABLET, FILM COATED ORAL DAILY
Refills: 0 | Status: DISCONTINUED | OUTPATIENT
Start: 2020-09-06 | End: 2020-09-09

## 2020-09-06 RX ORDER — ASPIRIN/CALCIUM CARB/MAGNESIUM 324 MG
81 TABLET ORAL DAILY
Refills: 0 | Status: DISCONTINUED | OUTPATIENT
Start: 2020-09-06 | End: 2020-09-09

## 2020-09-06 RX ADMIN — CHLORHEXIDINE GLUCONATE 1 APPLICATION(S): 213 SOLUTION TOPICAL at 06:24

## 2020-09-06 RX ADMIN — HEPARIN SODIUM 5000 UNIT(S): 5000 INJECTION INTRAVENOUS; SUBCUTANEOUS at 17:13

## 2020-09-06 RX ADMIN — LOSARTAN POTASSIUM 100 MILLIGRAM(S): 100 TABLET, FILM COATED ORAL at 15:42

## 2020-09-06 RX ADMIN — LOSARTAN POTASSIUM 75 MILLIGRAM(S): 100 TABLET, FILM COATED ORAL at 06:24

## 2020-09-06 RX ADMIN — HEPARIN SODIUM 5000 UNIT(S): 5000 INJECTION INTRAVENOUS; SUBCUTANEOUS at 06:23

## 2020-09-06 RX ADMIN — POLYETHYLENE GLYCOL 3350 17 GRAM(S): 17 POWDER, FOR SOLUTION ORAL at 11:25

## 2020-09-06 RX ADMIN — PANTOPRAZOLE SODIUM 40 MILLIGRAM(S): 20 TABLET, DELAYED RELEASE ORAL at 06:24

## 2020-09-06 RX ADMIN — AMLODIPINE BESYLATE 10 MILLIGRAM(S): 2.5 TABLET ORAL at 11:25

## 2020-09-06 RX ADMIN — Medication 81 MILLIGRAM(S): at 11:25

## 2020-09-06 RX ADMIN — Medication 81 MILLIGRAM(S): at 15:16

## 2020-09-06 RX ADMIN — Medication 3 MILLIGRAM(S): at 21:09

## 2020-09-06 RX ADMIN — Medication 25 MILLIGRAM(S): at 11:25

## 2020-09-06 RX ADMIN — SENNA PLUS 2 TABLET(S): 8.6 TABLET ORAL at 21:10

## 2020-09-06 RX ADMIN — ATORVASTATIN CALCIUM 40 MILLIGRAM(S): 80 TABLET, FILM COATED ORAL at 21:09

## 2020-09-06 NOTE — PROGRESS NOTE ADULT - ASSESSMENT
70F with HTN, HLD, and pre- DM presents with chest pain and new LBBB in setting of HTN urgency with 's now improved with BP management, cardiac enzymes negative with normal LV function. Course also c/b asymptomatic sinus bradycardia.

## 2020-09-06 NOTE — PHYSICAL THERAPY INITIAL EVALUATION ADULT - PERTINENT HX OF CURRENT PROBLEM, REHAB EVAL
Patient is a 70 year old female admitted for HTN urgency into 240s, cardiac enzymes negatives, normal LV function. PMH listed below

## 2020-09-06 NOTE — PHYSICAL THERAPY INITIAL EVALUATION ADULT - CRITERIA FOR SKILLED THERAPEUTIC INTERVENTIONS
risk reduction/prevention/rehab potential/therapy frequency/anticipated discharge recommendation/functional limitations in following categories/predicted duration of therapy intervention/impairments found

## 2020-09-06 NOTE — PROGRESS NOTE ADULT - SUBJECTIVE AND OBJECTIVE BOX
FOLLOW UP:  hypertensive urgency  SUBJECTIVE/OBSERVATIONS:  Patient seen and examined.   OVERNIGHT EVENTS:  TELE EVENTS:     Vital Signs Last 24 Hrs  T(C): 36.6 (06 Sep 2020 04:00), Max: 37.3 (05 Sep 2020 20:00)  T(F): 97.9 (06 Sep 2020 04:00), Max: 99.1 (05 Sep 2020 20:00)  HR: 48 (06 Sep 2020 06:00) (47 - 69)  BP: 127/37 (06 Sep 2020 06:00) (127/37 - 172/89)  BP(mean): 61 (06 Sep 2020 06:00) (56 - 106)  RR: 10 (06 Sep 2020 06:00) (10 - 26)  SpO2: 95% (05 Sep 2020 10:00) (93% - 95%)  I&O's Summary    05 Sep 2020 07:01  -  06 Sep 2020 07:00  --------------------------------------------------------  IN: 1000 mL / OUT: 0 mL / NET: 1000 mL        MEDICATIONS:  amLODIPine   Tablet 10 milliGRAM(s) Oral daily  aspirin  chewable 81 milliGRAM(s) Oral daily  heparin   Injectable 5000 Unit(s) SubCutaneous every 12 hours  hydrochlorothiazide 25 milliGRAM(s) Oral daily  losartan 75 milliGRAM(s) Oral daily  acetaminophen   Tablet .. 650 milliGRAM(s) Oral every 6 hours PRN  melatonin 3 milliGRAM(s) Oral at bedtime  pantoprazole    Tablet 40 milliGRAM(s) Oral before breakfast  polyethylene glycol 3350 17 Gram(s) Oral daily  senna 2 Tablet(s) Oral at bedtime    atorvastatin 40 milliGRAM(s) Oral at bedtime    chlorhexidine 4% Liquid 1 Application(s) Topical <User Schedule>  influenza  Vaccine (HIGH DOSE) 0.7 milliLiter(s) IntraMuscular once      REVIEW OF SYSTEMS:  Complete 10point ROS negative.    PHYSICAL EXAM:  General: NAD  Cardiovascular: Normal S1 S2, No JVD, No murmurs, No edema  Respiratory: Lungs clear to auscultation	  Gastrointestinal:  Soft, Non-tender, + BS	  Skin: warm and dry, No rashes, No ecchymoses, No cyanosis	  Extremities: Normal range of motion, No clubbing, cyanosis or edema  Vascular: Peripheral pulses palpable 2+ bilaterally    LABS:	 	    CBC Full  -  ( 06 Sep 2020 06:30 )  WBC Count : 10.54 K/uL  Hemoglobin : 13.6 g/dL  Hematocrit : 42.2 %  Platelet Count - Automated : 299 K/uL  Mean Cell Volume : 85.4 fL  Mean Cell Hemoglobin : 27.5 pg  Mean Cell Hemoglobin Concentration : 32.2 %  Auto Neutrophil # : x  Auto Lymphocyte # : x  Auto Monocyte # : x  Auto Eosinophil # : x  Auto Basophil # : x  Auto Neutrophil % : x  Auto Lymphocyte % : x  Auto Monocyte % : x  Auto Eosinophil % : x  Auto Basophil % : x    09-06    140  |  99  |  19  ----------------------------<  111<H>  4.1   |  25  |  0.71  09-05    140  |  105  |  14  ----------------------------<  91  4.0   |  23  |  0.72    Ca    10.0      06 Sep 2020 06:30  Ca    9.2      05 Sep 2020 05:45  Phos  4.1     09-06  Mg     2.0     09-06  Mg     2.0     09-05        proBNP: Serum Pro-Brain Natriuretic Peptide: 1013 pg/mL (09-03 @ 06:07)    < from: Transthoracic Echocardiogram (09.03.20 @ 12:41) >  DIMENSIONS:  Dimensions:     Normal Values:  LA:     3.9 cm    2.0 - 4.0 cm  Ao:     2.9 cm    2.0 - 3.8 cm  SEPTUM: 1.1 cm    0.6 - 1.2 cm  PWT:    1.2 cm    0.6 - 1.1 cm  LVIDd:  5.3 cm    3.0 - 5.6 cm  LVIDs:  3.5 cm    1.8 - 4.0 cm  Derived Variables:  LVMI: 121 g/m2  RWT: 0.45  Fractional short: 34 %  Ejection Fraction (Zahraatz): 62 %  ------------------------------------------------------------------------  OBSERVATIONS:  Mitral Valve: Mitral annular calcification, otherwise  normal mitral valve. Minimal mitral regurgitation.  Aortic Root: Normal aortic root.  Aortic Valve: Aortic valve leaflet morphology not well  visualized.  Left Atrium: Moderately dilated left atrium.  LA volume  index = 44 cc/m2.  Left Ventricle: Normal left ventricular systolic function.  No segmental wall motion abnormalities. Mild concentric  left ventricular hypertrophy. Mild diastolic dysfunction  (Stage I).  Right Heart: Normal right atrium. Normal right ventricular  size and function. Normal tricuspid valve. Minimal  tricuspid regurgitation. Normal pulmonic valve.  Pericardium/PleuraNormal pericardium with no pericardial  effusion.  ------------------------------------------------------------------------  CONCLUSIONS:  1. Mitral annular calcification, otherwise normal mitral  valve. Minimal mitral regurgitation.  2. Moderately dilated left atrium.  LA volume index = 44  cc/m2.  3. Mild concentric left ventricular hypertrophy.  4. Normal left ventricular systolic function. No segmental  wall motion abnormalities.  5. Mild diastolic dysfunction (Stage I).  6. Normal right ventricular size and function.

## 2020-09-06 NOTE — PROGRESS NOTE ADULT - SUBJECTIVE AND OBJECTIVE BOX
No CP or SOB, ROS otherwise negative       MEDICATIONS  (STANDING):  amLODIPine   Tablet 10 milliGRAM(s) Oral daily  aspirin enteric coated 81 milliGRAM(s) Oral daily  atorvastatin 40 milliGRAM(s) Oral at bedtime  chlorhexidine 4% Liquid 1 Application(s) Topical <User Schedule>  heparin   Injectable 5000 Unit(s) SubCutaneous every 12 hours  hydrochlorothiazide 25 milliGRAM(s) Oral daily  influenza  Vaccine (HIGH DOSE) 0.7 milliLiter(s) IntraMuscular once  losartan 100 milliGRAM(s) Oral daily  melatonin 3 milliGRAM(s) Oral at bedtime  pantoprazole    Tablet 40 milliGRAM(s) Oral before breakfast  polyethylene glycol 3350 17 Gram(s) Oral daily  senna 2 Tablet(s) Oral at bedtime    MEDICATIONS  (PRN):  acetaminophen   Tablet .. 650 milliGRAM(s) Oral every 6 hours PRN Mild Pain (1 - 3)      LABS:                            13.6   10.54 )-----------( 299      ( 06 Sep 2020 06:30 )             42.2     140  |  99  |  19  ----------------------------<  111<H>  4.1   |  25  |  0.71    Ca    10.0      06 Sep 2020 06:30  Phos  4.1     09-06  Mg     2.0     09-06    Creatinine Trend: 0.71<--, 0.72<--, 0.73<--, 0.53<--, 0.71<--      PHYSICAL EXAM  Vital Signs Last 24 Hrs  T(C): 36.9 (06 Sep 2020 14:15), Max: 37.3 (05 Sep 2020 20:00)  T(F): 98.5 (06 Sep 2020 14:15), Max: 99.1 (05 Sep 2020 20:00)  HR: 76 (06 Sep 2020 15:00) (47 - 76)  BP: 127/71 (06 Sep 2020 15:00) (124/101 - 166/67)  BP(mean): 83 (06 Sep 2020 15:00) (56 - 106)  RR: 20 (06 Sep 2020 15:00) (10 - 26)  SpO2: 100% (06 Sep 2020 14:15) (96% - 100%)      Appearance: Normal	  HEENT:   Normal oral mucosa, PERRL, EOMI	  Lymphatic: No lymphadenopathy , no edema  Cardiovascular: Normal S1 S2, No JVD, No murmurs , Peripheral pulses palpable 2+ bilaterally  Respiratory: Lungs clear to auscultation, normal effort 	  Gastrointestinal:  Soft, Non-tender, + BS	  Skin: No rashes, No ecchymoses, No cyanosis, warm to touch  Musculoskeletal: Normal range of motion, normal strength  Psychiatry:  Mood & affect appropriate    TELEMETRY: SR	      DIAGNOSTIC TESTING:   < from: Transthoracic Echocardiogram (09.03.20 @ 12:41) >  1. Mitral annular calcification, otherwise normal mitral  valve. Minimal mitral regurgitation.  2. Moderately dilated left atrium.  LA volume index = 44  cc/m2.  3. Mild concentric left ventricular hypertrophy.  4. Normal left ventricular systolic function. No segmental  wall motion abnormalities.  5. Mild diastolic dysfunction (Stage I).  6. Normal right ventricular size and function.  < end of copied text >      ASSESSMENT/PLAN: 70y Female HTN HLD LBBB admitted with HTN urgency and chest pain    -TTE with normal LV function  -Check NST to rule out CAD  -cont to uptitrate antihypertensives  -Further workup pending above  -Continue with supportive care per CCU

## 2020-09-06 NOTE — PROGRESS NOTE ADULT - PROBLEM SELECTOR PLAN 2
Patient remains with resting sinus bradycardia  -hold AVN blocking agents for now  -will assess patient's heart rate response to walking and activity

## 2020-09-06 NOTE — PROGRESS NOTE ADULT - SUBJECTIVE AND OBJECTIVE BOX
HISTORY OF PRESENT ILLNESS:   Ms Capone follows with Dr Vital at [facility].  She is a 71yo woman with hx CAD and uncontrolled severe hypertension.  She speaks Welsh.  History with aide of  at bedside.    She presented with chest pain for a few days duration, and admitted to the CCU for management of hypertensive emergency with SBP >220mmHg.  She was bradycardic at that time with sinus heartrate in the 40s. She has a new LBBB compared to EKG from 1/2020 with narrow QRS complex.    She states she is not having any palpitations, orthopnea, LE swelling, shortness of breath.  Can walk a few blocks without D.O.E..  Has been aware of slow heartrate for a few years.  No angina at this time.  No history of lightheadedness/near-syncope or syncope. A 10 pt ROS is negative.    9/4- no interval significant changes. stable sinus radha 50's on telemetry.  no SVT or VT.  BP is improving on nitro drip.  no angina or palpitations.  reviewed echocardiogram's reassuring results w/ patient.   9/5 - no significant bradycardia, HR stable in 50s-60s. no pauses.  BP stable on oral medications. not experiencing angina or palpitations. has not been ambulating yet.  9/6 - HR stable in 50s at rest.  during activity, HR up to 100bpm with no WV prolongation or AV block.  no nausea or vomiting, no angina.    acetaminophen   Tablet .. 650 milliGRAM(s) Oral every 6 hours PRN  amLODIPine   Tablet 10 milliGRAM(s) Oral daily  aspirin enteric coated 81 milliGRAM(s) Oral daily  atorvastatin 40 milliGRAM(s) Oral at bedtime  chlorhexidine 4% Liquid 1 Application(s) Topical <User Schedule>  heparin   Injectable 5000 Unit(s) SubCutaneous every 12 hours  hydrochlorothiazide 25 milliGRAM(s) Oral daily  influenza  Vaccine (HIGH DOSE) 0.7 milliLiter(s) IntraMuscular once  losartan 100 milliGRAM(s) Oral daily  melatonin 3 milliGRAM(s) Oral at bedtime  pantoprazole    Tablet 40 milliGRAM(s) Oral before breakfast  polyethylene glycol 3350 17 Gram(s) Oral daily  senna 2 Tablet(s) Oral at bedtime                            13.6   10.54 )-----------( 299      ( 06 Sep 2020 06:30 )             42.2       09-06    140  |  99  |  19  ----------------------------<  111<H>  4.1   |  25  |  0.71    Ca    10.0      06 Sep 2020 06:30  Phos  4.1     09-06  Mg     2.0     09-06      T(C): 36.9 (09-06-20 @ 14:15), Max: 37.3 (09-05-20 @ 20:00)  HR: 76 (09-06-20 @ 15:00) (47 - 76)  BP: 127/71 (09-06-20 @ 15:00) (124/101 - 166/67)  RR: 20 (09-06-20 @ 15:00) (10 - 26)  SpO2: 100% (09-06-20 @ 14:15) (96% - 100%)  Wt(kg): --    I&O's Summary    05 Sep 2020 07:01  -  06 Sep 2020 07:00  --------------------------------------------------------  IN: 1000 mL / OUT: 0 mL / NET: 1000 mL          Appearance: Overweight  female in no acute distress	  HEENT:   Normal oral mucosa, PERRL, EOMI	  Lymphatic: No lymphadenopathy , no edema  Cardiovascular: Normal S1 S2, No JVD, No murmurs , Peripheral pulses palpable 2+ bilaterally  Respiratory: Lungs clear to auscultation, normal effort 	  Gastrointestinal:  Soft, Non-tender, + BS	  Skin: No rashes, No ecchymoses, No cyanosis, warm to touch  Musculoskeletal: Normal range of motion, normal strength  Psychiatry:  Mood & affect appropriate    TELEMETRY: sinus radha 40s, to sinus rhythm 70-80.	    ECG:  sinus radha 40s, normal WV, LBBB 140ms.  Echo: mild LVH, normal EF despite hypertensive condition, no severe valve dysfunction.  MPI: Stress-induced global severe hypokinesis.  Diffuse patchy tracer uptake, no clear diagnosis of ischemia.    ASSESSMENT/PLAN: 	70y Female with CAD, pre-diabetes, HTN, presents with atypical chest pain and hypertensive emergency.    Echo with normal EF.  Oral vasodilators have been started by CCU.  BP remains labile, 120-170mmHg systolic.    Consider ARB + MRA neurohormonal blockade in management of her hypertension, since she has developed LV hypertrophy.    LBBB is new compared to last EKG with narrow QRS in 1/2020.  Normal WV suggests conduction defect below the bundle of His.    Observed ambulatory telemetry shows 1:1 AV conduction up to 100bpm.    A nuclear stress test has been completed, with results concerning for 3V CAD (vasodilator-induced LV dilatation and hypokinesis).  Consider coronary angiography.  Additionally, consider outpatient Cardiac MRI to look for Sarcoid or infiltrative CM as cause of new LBBB.    Will follow.    Phong Starks M.D.  Cardiac Electrophysiology    office [phone]  pager [phone]

## 2020-09-07 LAB
ANION GAP SERPL CALC-SCNC: 13 MMO/L — SIGNIFICANT CHANGE UP (ref 7–14)
BUN SERPL-MCNC: 17 MG/DL — SIGNIFICANT CHANGE UP (ref 7–23)
CALCIUM SERPL-MCNC: 9.6 MG/DL — SIGNIFICANT CHANGE UP (ref 8.4–10.5)
CHLORIDE SERPL-SCNC: 100 MMOL/L — SIGNIFICANT CHANGE UP (ref 98–107)
CO2 SERPL-SCNC: 25 MMOL/L — SIGNIFICANT CHANGE UP (ref 22–31)
CREAT SERPL-MCNC: 0.74 MG/DL — SIGNIFICANT CHANGE UP (ref 0.5–1.3)
GLUCOSE SERPL-MCNC: 120 MG/DL — HIGH (ref 70–99)
HCT VFR BLD CALC: 41 % — SIGNIFICANT CHANGE UP (ref 34.5–45)
HGB BLD-MCNC: 12.8 G/DL — SIGNIFICANT CHANGE UP (ref 11.5–15.5)
MAGNESIUM SERPL-MCNC: 1.9 MG/DL — SIGNIFICANT CHANGE UP (ref 1.6–2.6)
MCHC RBC-ENTMCNC: 26.2 PG — LOW (ref 27–34)
MCHC RBC-ENTMCNC: 31.2 % — LOW (ref 32–36)
MCV RBC AUTO: 84 FL — SIGNIFICANT CHANGE UP (ref 80–100)
NRBC # FLD: 0 K/UL — SIGNIFICANT CHANGE UP (ref 0–0)
PHOSPHATE SERPL-MCNC: 3.6 MG/DL — SIGNIFICANT CHANGE UP (ref 2.5–4.5)
PLATELET # BLD AUTO: 310 K/UL — SIGNIFICANT CHANGE UP (ref 150–400)
PMV BLD: 10.1 FL — SIGNIFICANT CHANGE UP (ref 7–13)
POTASSIUM SERPL-MCNC: 3.8 MMOL/L — SIGNIFICANT CHANGE UP (ref 3.5–5.3)
POTASSIUM SERPL-SCNC: 3.8 MMOL/L — SIGNIFICANT CHANGE UP (ref 3.5–5.3)
RBC # BLD: 4.88 M/UL — SIGNIFICANT CHANGE UP (ref 3.8–5.2)
RBC # FLD: 13.6 % — SIGNIFICANT CHANGE UP (ref 10.3–14.5)
SODIUM SERPL-SCNC: 138 MMOL/L — SIGNIFICANT CHANGE UP (ref 135–145)
WBC # BLD: 8.24 K/UL — SIGNIFICANT CHANGE UP (ref 3.8–10.5)
WBC # FLD AUTO: 8.24 K/UL — SIGNIFICANT CHANGE UP (ref 3.8–10.5)

## 2020-09-07 RX ADMIN — Medication 650 MILLIGRAM(S): at 21:40

## 2020-09-07 RX ADMIN — AMLODIPINE BESYLATE 10 MILLIGRAM(S): 2.5 TABLET ORAL at 11:25

## 2020-09-07 RX ADMIN — HEPARIN SODIUM 5000 UNIT(S): 5000 INJECTION INTRAVENOUS; SUBCUTANEOUS at 05:48

## 2020-09-07 RX ADMIN — Medication 81 MILLIGRAM(S): at 11:25

## 2020-09-07 RX ADMIN — Medication 650 MILLIGRAM(S): at 21:10

## 2020-09-07 RX ADMIN — ATORVASTATIN CALCIUM 40 MILLIGRAM(S): 80 TABLET, FILM COATED ORAL at 21:10

## 2020-09-07 RX ADMIN — HEPARIN SODIUM 5000 UNIT(S): 5000 INJECTION INTRAVENOUS; SUBCUTANEOUS at 17:02

## 2020-09-07 RX ADMIN — PANTOPRAZOLE SODIUM 40 MILLIGRAM(S): 20 TABLET, DELAYED RELEASE ORAL at 05:48

## 2020-09-07 RX ADMIN — Medication 25 MILLIGRAM(S): at 11:26

## 2020-09-07 RX ADMIN — LOSARTAN POTASSIUM 100 MILLIGRAM(S): 100 TABLET, FILM COATED ORAL at 05:49

## 2020-09-07 RX ADMIN — Medication 3 MILLIGRAM(S): at 21:10

## 2020-09-07 NOTE — PROGRESS NOTE ADULT - SUBJECTIVE AND OBJECTIVE BOX
No CP or SOB, ROS otherwise negative       MEDICATIONS  (STANDING):  amLODIPine   Tablet 10 milliGRAM(s) Oral daily  aspirin enteric coated 81 milliGRAM(s) Oral daily  atorvastatin 40 milliGRAM(s) Oral at bedtime  heparin   Injectable 5000 Unit(s) SubCutaneous every 12 hours  hydrochlorothiazide 25 milliGRAM(s) Oral daily  influenza  Vaccine (HIGH DOSE) 0.7 milliLiter(s) IntraMuscular once  losartan 100 milliGRAM(s) Oral daily  melatonin 3 milliGRAM(s) Oral at bedtime  pantoprazole    Tablet 40 milliGRAM(s) Oral before breakfast  polyethylene glycol 3350 17 Gram(s) Oral daily  senna 2 Tablet(s) Oral at bedtime    MEDICATIONS  (PRN):  acetaminophen   Tablet .. 650 milliGRAM(s) Oral every 6 hours PRN Mild Pain (1 - 3)      LABS:                            13.6   10.54 )-----------( 299      ( 06 Sep 2020 06:30 )             42.2     140  |  99  |  19  ----------------------------<  111<H>  4.1   |  25  |  0.71    Ca    10.0      06 Sep 2020 06:30  Phos  4.1     09-06  Mg     2.0     09-06    Creatinine Trend: 0.71<--, 0.72<--, 0.73<--, 0.53<--, 0.71<--      PHYSICAL EXAM  Vital Signs Last 24 Hrs  T(C): 36.7 (07 Sep 2020 05:39), Max: 36.9 (06 Sep 2020 14:15)  T(F): 98 (07 Sep 2020 05:39), Max: 98.5 (06 Sep 2020 14:15)  HR: 57 (07 Sep 2020 05:39) (54 - 76)  BP: 145/68 (07 Sep 2020 05:39) (124/101 - 166/67)  BP(mean): 83 (06 Sep 2020 15:00) (81 - 106)  RR: 18 (07 Sep 2020 05:39) (16 - 21)  SpO2: 98% (07 Sep 2020 05:39) (96% - 100%)      Appearance: Normal	  HEENT:   Normal oral mucosa, PERRL, EOMI	  Lymphatic: No lymphadenopathy , no edema  Cardiovascular: Normal S1 S2, No JVD, No murmurs , Peripheral pulses palpable 2+ bilaterally  Respiratory: Lungs clear to auscultation, normal effort 	  Gastrointestinal:  Soft, Non-tender, + BS	  Skin: No rashes, No ecchymoses, No cyanosis, warm to touch  Musculoskeletal: Normal range of motion, normal strength  Psychiatry:  Mood & affect appropriate    TELEMETRY: SR	      DIAGNOSTIC TESTING:   < from: Transthoracic Echocardiogram (09.03.20 @ 12:41) >  1. Mitral annular calcification, otherwise normal mitral  valve. Minimal mitral regurgitation.  2. Moderately dilated left atrium.  LA volume index = 44  cc/m2.  3. Mild concentric left ventricular hypertrophy.  4. Normal left ventricular systolic function. No segmental  wall motion abnormalities.  5. Mild diastolic dysfunction (Stage I).  6. Normal right ventricular size and function.  < end of copied text >      < from: Nuclear Stress Test-Pharmacologic (09.06.20 @ 13:10) >  GATED ANALYSIS:  Post-stress gated wall motion analysis was performed (LVEF  = 31 %;LVEDV = 156 ml.), revealing hypokinesis.  ------------------------------------------------------------------------  LV/RV OBSERVATION:  Dilated LV  ------------------------------------------------------------------------  IMPRESSIONS:Abnormal Study  * Chest Pain: No chest pain with administration of  Regadenoson.  * Symptom: Nausea.  * HR Response: Appropriate.  * BP Response: Appropriate.  * Heart Rhythm: Normal Sinus Rhythm - 59 BPM.  * Conduction defects: LBBB.  * Baseline ECG: Nonspecific ST-T wave abnormality.  * ECG Abnormalities: None.  * Arrhythmia: Frequent VPDs occurred duiring Regadenoson  injection.  * Diffuse, patchy tracer uptake suggests the presence of  non-specific cardiomyopathy. No clear evidence to suggest  the presence of ischemia.  * Post-stress gated wall motion analysis was performed  (LVEF = 31 %;LVEDV = 156 ml.), revealing hypokinesis.  Dilated LV.  Conclusion:  Diffuse, patchy tracer uptake suggests the presence of  non-specific cardiomyopathy. No clear evidence to suggest  the presence of ischemia.  ------------------------------------------------------------------------  Confirmed on  9/6/2020 - 14:03:32 by Brandt Castillo MD, [facility],  FASE, RPVI    < end of copied text >      ASSESSMENT/PLAN: 70y Female HTN HLD LBBB admitted with HTN urgency and chest pain    -TTE with normal LV function  -cont to uptitrate antihypertensives  -NST abormal as above with dilated LV, EF 31% during stress  -given drop in LV function, plan for LHC to r/p obs CAD

## 2020-09-07 NOTE — PROGRESS NOTE ADULT - SUBJECTIVE AND OBJECTIVE BOX
HISTORY OF PRESENT ILLNESS:   Ms Capone follows with Dr Vital at [facility].  She is a 69yo woman with hx CAD and uncontrolled severe hypertension.  She speaks Wolof.  History with aide of  at bedside.    She presented with chest pain for a few days duration, and admitted to the CCU for management of hypertensive emergency with SBP >220mmHg.  She was bradycardic at that time with sinus heartrate in the 40s. She has a new LBBB compared to EKG from 1/2020 with narrow QRS complex.    She states she is not having any palpitations, orthopnea, LE swelling, shortness of breath.  Can walk a few blocks without D.O.E..  Has been aware of slow heartrate for a few years.  No angina at this time.  No history of lightheadedness/near-syncope or syncope. A 10 pt ROS is negative.    9/4- no interval significant changes. stable sinus radha 50's on telemetry.  no SVT or VT.  BP is improving on nitro drip.  no angina or palpitations.  reviewed echocardiogram's reassuring results w/ patient.   9/5 - no significant bradycardia, HR stable in 50s-60s. no pauses.  BP stable on oral medications. not experiencing angina or palpitations. has not been ambulating yet.  9/6 - HR stable in 50s at rest.  during activity, HR up to 100bpm with no UT prolongation or AV block.  no nausea or vomiting, no angina.  9/7 - HR and BP stable.  no nausea, vomiting or SOB.      acetaminophen   Tablet .. 650 milliGRAM(s) Oral every 6 hours PRN  amLODIPine   Tablet 10 milliGRAM(s) Oral daily  aspirin enteric coated 81 milliGRAM(s) Oral daily  atorvastatin 40 milliGRAM(s) Oral at bedtime  heparin   Injectable 5000 Unit(s) SubCutaneous every 12 hours  hydrochlorothiazide 25 milliGRAM(s) Oral daily  influenza  Vaccine (HIGH DOSE) 0.7 milliLiter(s) IntraMuscular once  losartan 100 milliGRAM(s) Oral daily  melatonin 3 milliGRAM(s) Oral at bedtime  pantoprazole    Tablet 40 milliGRAM(s) Oral before breakfast  polyethylene glycol 3350 17 Gram(s) Oral daily  senna 2 Tablet(s) Oral at bedtime                            12.8   8.24  )-----------( 310      ( 07 Sep 2020 10:14 )             41.0       09-07    138  |  100  |  17  ----------------------------<  120<H>  3.8   |  25  |  0.74    Ca    9.6      07 Sep 2020 10:54  Phos  3.6     09-07  Mg     1.9     09-07          T(C): 36.7 (09-07-20 @ 11:20), Max: 36.9 (09-06-20 @ 14:15)  HR: 58 (09-07-20 @ 11:20) (57 - 76)  BP: 145/62 (09-07-20 @ 11:20) (127/71 - 152/59)  RR: 16 (09-07-20 @ 11:20) (16 - 20)  SpO2: 99% (09-07-20 @ 11:20) (96% - 100%)  Wt(kg): --    I&O's Summary    06 Sep 2020 07:01  -  07 Sep 2020 07:00  --------------------------------------------------------  IN: 0 mL / OUT: 200 mL / NET: -200 mL      Appearance: Overweight  female in no acute distress	  HEENT:   Normal oral mucosa, PERRL, EOMI	  Lymphatic: No lymphadenopathy , no edema  Cardiovascular: Normal S1 S2, No JVD, No murmurs , Peripheral pulses palpable 2+ bilaterally  Respiratory: Lungs clear to auscultation, normal effort 	  Gastrointestinal:  Soft, Non-tender, + BS	  Skin: No rashes, No ecchymoses, No cyanosis, warm to touch  Musculoskeletal: Normal range of motion, normal strength  Psychiatry:  Mood & affect appropriate    TELEMETRY: sinus radha 40s, to sinus rhythm 70-80.	    ECG:  sinus radha 40s, normal UT, LBBB 140ms.  Echo: mild LVH, normal EF despite hypertensive condition, no severe valve dysfunction.  MPI: Stress-induced global severe hypokinesis.  Diffuse patchy tracer uptake, no clear diagnosis of ischemia.    ASSESSMENT/PLAN: 	70y Female with CAD, pre-diabetes, HTN, presents with atypical chest pain and hypertensive emergency.    Consider ARB + MRA neurohormonal blockade in management of her hypertension, since she has developed LV hypertrophy.  LBBB is new compared to last EKG with narrow QRS in 1/2020.  Normal UT suggests conduction defect below the bundle of His, without CHF!    Observed ambulatory telemetry shows 1:1 AV conduction up to 100bpm.  A nuclear stress test has been completed, with results concerning for 3V CAD (vasodilator-induced LV dilatation and hypokinesis).    Plan:    Case discussed w/ Dr Razo.  Coronary Angiography to follow abnormal stress-induced hypokinesis.    Outpatient (or inpatient if she stays hospitalized!) Cardiac MRI to look for Sarcoid or infiltrative CM as cause of new LBBB.    Will follow.    Phong Starks M.D.  Cardiac Electrophysiology    office [phone]  pager [phone] HISTORY OF PRESENT ILLNESS:   Ms Capone follows with Dr Vital at [facility].  She is a 71yo woman with hx CAD and uncontrolled severe hypertension.  She speaks Mongolian.  History with aide of  at bedside.    She presented with chest pain for a few days duration, and admitted to the CCU for management of hypertensive emergency with SBP >220mmHg.  She was bradycardic at that time with sinus heartrate in the 40s. She has a new LBBB compared to EKG from 1/2020 with narrow QRS complex.    She states she is not having any palpitations, orthopnea, LE swelling, shortness of breath.  Can walk a few blocks without D.O.E..  Has been aware of slow heartrate for a few years.  No angina at this time.  No history of lightheadedness/near-syncope or syncope. A 10 pt ROS is negative.    9/4- no interval significant changes. stable sinus radha 50's on telemetry.  no SVT or VT.  BP is improving on nitro drip.  no angina or palpitations.  reviewed echocardiogram's reassuring results w/ patient.   9/5 - no significant bradycardia, HR stable in 50s-60s. no pauses.  BP stable on oral medications. not experiencing angina or palpitations. has not been ambulating yet.  9/6 - HR stable in 50s at rest.  during activity, HR up to 100bpm with no MS prolongation or AV block.  no nausea or vomiting, no angina.  9/7 - HR and BP stable.  no nausea, vomiting or SOB.      acetaminophen   Tablet .. 650 milliGRAM(s) Oral every 6 hours PRN  amLODIPine   Tablet 10 milliGRAM(s) Oral daily  aspirin enteric coated 81 milliGRAM(s) Oral daily  atorvastatin 40 milliGRAM(s) Oral at bedtime  heparin   Injectable 5000 Unit(s) SubCutaneous every 12 hours  hydrochlorothiazide 25 milliGRAM(s) Oral daily  influenza  Vaccine (HIGH DOSE) 0.7 milliLiter(s) IntraMuscular once  losartan 100 milliGRAM(s) Oral daily  melatonin 3 milliGRAM(s) Oral at bedtime  pantoprazole    Tablet 40 milliGRAM(s) Oral before breakfast  polyethylene glycol 3350 17 Gram(s) Oral daily  senna 2 Tablet(s) Oral at bedtime                            12.8   8.24  )-----------( 310      ( 07 Sep 2020 10:14 )             41.0       09-07    138  |  100  |  17  ----------------------------<  120<H>  3.8   |  25  |  0.74    Ca    9.6      07 Sep 2020 10:54  Phos  3.6     09-07  Mg     1.9     09-07          T(C): 36.7 (09-07-20 @ 11:20), Max: 36.9 (09-06-20 @ 14:15)  HR: 58 (09-07-20 @ 11:20) (57 - 76)  BP: 145/62 (09-07-20 @ 11:20) (127/71 - 152/59)  RR: 16 (09-07-20 @ 11:20) (16 - 20)  SpO2: 99% (09-07-20 @ 11:20) (96% - 100%)  Wt(kg): --    I&O's Summary    06 Sep 2020 07:01  -  07 Sep 2020 07:00  --------------------------------------------------------  IN: 0 mL / OUT: 200 mL / NET: -200 mL      Appearance: Overweight  female in no acute distress	  HEENT:   Normal oral mucosa, PERRL, EOMI	  Lymphatic: No lymphadenopathy , no edema  Cardiovascular: Normal S1 S2, No JVD, No murmurs , Peripheral pulses palpable 2+ bilaterally  Respiratory: Lungs clear to auscultation, normal effort 	  Gastrointestinal:  Soft, Non-tender, + BS	  Skin: No rashes, No ecchymoses, No cyanosis, warm to touch  Musculoskeletal: Normal range of motion, normal strength  Psychiatry:  Mood & affect appropriate    TELEMETRY: sinus radha 40s, to sinus rhythm 70-80.	    ECG:  sinus radha 40s, normal MS, LBBB 140ms.  Echo: mild LVH, normal EF despite hypertensive condition, no severe valve dysfunction.  MPI: Stress-induced global severe hypokinesis.  Diffuse patchy tracer uptake, no clear diagnosis of ischemia.    ASSESSMENT/PLAN: 	70y Female with CAD, pre-diabetes, HTN, presents with atypical chest pain and hypertensive emergency.    Consider ARB + MRA neurohormonal blockade in management of her hypertension, since she has developed LV hypertrophy.  LBBB is new compared to last EKG with narrow QRS in 1/2020.  Normal MS suggests conduction defect below the bundle of His, without CHF!    Observed ambulatory telemetry shows 1:1 AV conduction up to 100bpm.  A nuclear stress test has been completed, with results concerning for 3V CAD (vasodilator-induced LV dilatation and hypokinesis).    Plan:    Case discussed w/ Dr Razo.  Coronary Angiography to follow abnormal stress-induced hypokinesis.    COVID test ordered at 1242PM  Outpatient (or inpatient if she stays hospitalized!) Cardiac MRI to look for Sarcoid or infiltrative CM as cause of new LBBB.    Will follow.    Phong Starks M.D.  Cardiac Electrophysiology    office [phone]  pager [phone]

## 2020-09-08 LAB
ANION GAP SERPL CALC-SCNC: 11 MMO/L — SIGNIFICANT CHANGE UP (ref 7–14)
BUN SERPL-MCNC: 22 MG/DL — SIGNIFICANT CHANGE UP (ref 7–23)
CALCIUM SERPL-MCNC: 9.6 MG/DL — SIGNIFICANT CHANGE UP (ref 8.4–10.5)
CHLORIDE SERPL-SCNC: 98 MMOL/L — SIGNIFICANT CHANGE UP (ref 98–107)
CO2 SERPL-SCNC: 26 MMOL/L — SIGNIFICANT CHANGE UP (ref 22–31)
CREAT SERPL-MCNC: 0.76 MG/DL — SIGNIFICANT CHANGE UP (ref 0.5–1.3)
GLUCOSE SERPL-MCNC: 103 MG/DL — HIGH (ref 70–99)
HCT VFR BLD CALC: 43 % — SIGNIFICANT CHANGE UP (ref 34.5–45)
HGB BLD-MCNC: 13.4 G/DL — SIGNIFICANT CHANGE UP (ref 11.5–15.5)
MAGNESIUM SERPL-MCNC: 2 MG/DL — SIGNIFICANT CHANGE UP (ref 1.6–2.6)
MCHC RBC-ENTMCNC: 26.3 PG — LOW (ref 27–34)
MCHC RBC-ENTMCNC: 31.2 % — LOW (ref 32–36)
MCV RBC AUTO: 84.5 FL — SIGNIFICANT CHANGE UP (ref 80–100)
NRBC # FLD: 0 K/UL — SIGNIFICANT CHANGE UP (ref 0–0)
PHOSPHATE SERPL-MCNC: 4 MG/DL — SIGNIFICANT CHANGE UP (ref 2.5–4.5)
PLATELET # BLD AUTO: 324 K/UL — SIGNIFICANT CHANGE UP (ref 150–400)
PMV BLD: 9.7 FL — SIGNIFICANT CHANGE UP (ref 7–13)
POTASSIUM SERPL-MCNC: 3.7 MMOL/L — SIGNIFICANT CHANGE UP (ref 3.5–5.3)
POTASSIUM SERPL-SCNC: 3.7 MMOL/L — SIGNIFICANT CHANGE UP (ref 3.5–5.3)
RBC # BLD: 5.09 M/UL — SIGNIFICANT CHANGE UP (ref 3.8–5.2)
RBC # FLD: 13.3 % — SIGNIFICANT CHANGE UP (ref 10.3–14.5)
SODIUM SERPL-SCNC: 135 MMOL/L — SIGNIFICANT CHANGE UP (ref 135–145)
WBC # BLD: 10.49 K/UL — SIGNIFICANT CHANGE UP (ref 3.8–10.5)
WBC # FLD AUTO: 10.49 K/UL — SIGNIFICANT CHANGE UP (ref 3.8–10.5)

## 2020-09-08 RX ORDER — POTASSIUM CHLORIDE 20 MEQ
20 PACKET (EA) ORAL ONCE
Refills: 0 | Status: COMPLETED | OUTPATIENT
Start: 2020-09-08 | End: 2020-09-08

## 2020-09-08 RX ORDER — PANTOPRAZOLE SODIUM 20 MG/1
1 TABLET, DELAYED RELEASE ORAL
Qty: 30 | Refills: 0
Start: 2020-09-08

## 2020-09-08 RX ORDER — AMLODIPINE BESYLATE 2.5 MG/1
1 TABLET ORAL
Qty: 0 | Refills: 0 | DISCHARGE
Start: 2020-09-08

## 2020-09-08 RX ADMIN — AMLODIPINE BESYLATE 10 MILLIGRAM(S): 2.5 TABLET ORAL at 12:02

## 2020-09-08 RX ADMIN — LOSARTAN POTASSIUM 100 MILLIGRAM(S): 100 TABLET, FILM COATED ORAL at 05:52

## 2020-09-08 RX ADMIN — HEPARIN SODIUM 5000 UNIT(S): 5000 INJECTION INTRAVENOUS; SUBCUTANEOUS at 05:51

## 2020-09-08 RX ADMIN — Medication 20 MILLIEQUIVALENT(S): at 09:43

## 2020-09-08 RX ADMIN — Medication 650 MILLIGRAM(S): at 23:35

## 2020-09-08 RX ADMIN — Medication 25 MILLIGRAM(S): at 12:02

## 2020-09-08 RX ADMIN — PANTOPRAZOLE SODIUM 40 MILLIGRAM(S): 20 TABLET, DELAYED RELEASE ORAL at 05:52

## 2020-09-08 RX ADMIN — Medication 81 MILLIGRAM(S): at 12:02

## 2020-09-08 RX ADMIN — ATORVASTATIN CALCIUM 40 MILLIGRAM(S): 80 TABLET, FILM COATED ORAL at 22:18

## 2020-09-08 RX ADMIN — HEPARIN SODIUM 5000 UNIT(S): 5000 INJECTION INTRAVENOUS; SUBCUTANEOUS at 17:10

## 2020-09-08 NOTE — PROGRESS NOTE ADULT - SUBJECTIVE AND OBJECTIVE BOX
S: no chest pain or sob; ros otherwise negative.     acetaminophen   Tablet .. 650 milliGRAM(s) Oral every 6 hours PRN  amLODIPine   Tablet 10 milliGRAM(s) Oral daily  aspirin enteric coated 81 milliGRAM(s) Oral daily  atorvastatin 40 milliGRAM(s) Oral at bedtime  heparin   Injectable 5000 Unit(s) SubCutaneous every 12 hours  hydrochlorothiazide 25 milliGRAM(s) Oral daily  influenza  Vaccine (HIGH DOSE) 0.7 milliLiter(s) IntraMuscular once  losartan 100 milliGRAM(s) Oral daily  melatonin 3 milliGRAM(s) Oral at bedtime  pantoprazole    Tablet 40 milliGRAM(s) Oral before breakfast  polyethylene glycol 3350 17 Gram(s) Oral daily  senna 2 Tablet(s) Oral at bedtime                            13.4   10.49 )-----------( 324      ( 08 Sep 2020 06:45 )             43.0       09-08    135  |  98  |  22  ----------------------------<  103<H>  3.7   |  26  |  0.76    Ca    9.6      08 Sep 2020 06:45  Phos  4.0     09-08  Mg     2.0     09-08              T(C): 36.5 (09-08-20 @ 08:00), Max: 36.8 (09-07-20 @ 21:08)  HR: 59 (09-08-20 @ 08:00) (51 - 62)  BP: 136/77 (09-08-20 @ 08:00) (136/77 - 154/75)  RR: 15 (09-08-20 @ 08:00) (15 - 20)  SpO2: 98% (09-08-20 @ 08:00) (96% - 98%)  Wt(kg): --    I&O's Summary      Appearance: Normal	  HEENT:   Normal oral mucosa, PERRL, EOMI	  Lymphatic: No lymphadenopathy , no edema  Cardiovascular: Normal S1 S2, No JVD, No murmurs , Peripheral pulses palpable 2+ bilaterally  Respiratory: Lungs clear to auscultation, normal effort 	  Gastrointestinal:  Soft, Non-tender, + BS	  Skin: No rashes, No ecchymoses, No cyanosis, warm to touch  Musculoskeletal: Normal range of motion, normal strength  Psychiatry:  Mood & affect appropriate    TELEMETRY: SR	      DIAGNOSTIC TESTING:   < from: Transthoracic Echocardiogram (09.03.20 @ 12:41) >  1. Mitral annular calcification, otherwise normal mitral  valve. Minimal mitral regurgitation.  2. Moderately dilated left atrium.  LA volume index = 44  cc/m2.  3. Mild concentric left ventricular hypertrophy.  4. Normal left ventricular systolic function. No segmental  wall motion abnormalities.  5. Mild diastolic dysfunction (Stage I).  6. Normal right ventricular size and function.  < end of copied text >      < from: Nuclear Stress Test-Pharmacologic (09.06.20 @ 13:10) >  GATED ANALYSIS:  Post-stress gated wall motion analysis was performed (LVEF  = 31 %;LVEDV = 156 ml.), revealing hypokinesis.  ------------------------------------------------------------------------  LV/RV OBSERVATION:  Dilated LV  ------------------------------------------------------------------------  IMPRESSIONS:Abnormal Study  * Chest Pain: No chest pain with administration of  Regadenoson.  * Symptom: Nausea.  * HR Response: Appropriate.  * BP Response: Appropriate.  * Heart Rhythm: Normal Sinus Rhythm - 59 BPM.  * Conduction defects: LBBB.  * Baseline ECG: Nonspecific ST-T wave abnormality.  * ECG Abnormalities: None.  * Arrhythmia: Frequent VPDs occurred duiring Regadenoson  injection.  * Diffuse, patchy tracer uptake suggests the presence of  non-specific cardiomyopathy. No clear evidence to suggest  the presence of ischemia.  * Post-stress gated wall motion analysis was performed  (LVEF = 31 %;LVEDV = 156 ml.), revealing hypokinesis.  Dilated LV.  Conclusion:  Diffuse, patchy tracer uptake suggests the presence of  non-specific cardiomyopathy. No clear evidence to suggest  the presence of ischemia.  ------------------------------------------------------------------------  Confirmed on  9/6/2020 - 14:03:32 by Brandt Castillo MD, FACC,  JEANNIE, ROBERTO    < end of copied text >      ASSESSMENT/PLAN: 70y Female HTN HLD LBBB admitted with HTN urgency and chest pain    -TTE with normal LV function  -cont to uptitrate antihypertensives as needed/tolerated   -NST abormal as above with dilated LV, EF 31% during stress  -given recurrent chest pain with drop in EF on stress test, recommend coronary angiogram to rule out CAD  -follow up EP     Jc Razo MD, FACC

## 2020-09-08 NOTE — PROGRESS NOTE ADULT - SUBJECTIVE AND OBJECTIVE BOX
HISTORY OF PRESENT ILLNESS:   Ms Capone follows with Dr Vital at [facility].  She is a 71yo woman with hx CAD and uncontrolled severe hypertension.  She speaks English.  History with aide of  at bedside.    She presented with chest pain for a few days duration, and admitted to the CCU for management of hypertensive emergency with SBP >220mmHg.  She was bradycardic at that time with sinus heartrate in the 40s. She has a new LBBB compared to EKG from 1/2020 with narrow QRS complex.    She states she is not having any palpitations, orthopnea, LE swelling, shortness of breath.  Can walk a few blocks without D.O.E..  Has been aware of slow heartrate for a few years.  No angina at this time.  No history of lightheadedness/near-syncope or syncope. A 10 pt ROS is negative.    9/4- no interval significant changes. stable sinus rdaha 50's on telemetry.  no SVT or VT.  BP is improving on nitro drip.  no angina or palpitations.  reviewed echocardiogram's reassuring results w/ patient.   9/5 - no significant bradycardia, HR stable in 50s-60s. no pauses.  BP stable on oral medications. not experiencing angina or palpitations. has not been ambulating yet.  9/6 - HR stable in 50s at rest.  during activity, HR up to 100bpm with no MD prolongation or AV block.  no nausea or vomiting, no angina.  9/7 - HR and BP stable.  no nausea, vomiting or SOB.    9/8 - uneventful overnight.  finished LHC, mild luminal irregularities.  feels well.    acetaminophen   Tablet .. 650 milliGRAM(s) Oral every 6 hours PRN  amLODIPine   Tablet 10 milliGRAM(s) Oral daily  aspirin enteric coated 81 milliGRAM(s) Oral daily  atorvastatin 40 milliGRAM(s) Oral at bedtime  heparin   Injectable 5000 Unit(s) SubCutaneous every 12 hours  hydrochlorothiazide 25 milliGRAM(s) Oral daily  influenza  Vaccine (HIGH DOSE) 0.7 milliLiter(s) IntraMuscular once  losartan 100 milliGRAM(s) Oral daily  melatonin 3 milliGRAM(s) Oral at bedtime  pantoprazole    Tablet 40 milliGRAM(s) Oral before breakfast  polyethylene glycol 3350 17 Gram(s) Oral daily  senna 2 Tablet(s) Oral at bedtime                            13.4   10.49 )-----------( 324      ( 08 Sep 2020 06:45 )             43.0       09-08    135  |  98  |  22  ----------------------------<  103<H>  3.7   |  26  |  0.76    Ca    9.6      08 Sep 2020 06:45  Phos  4.0     09-08  Mg     2.0     09-08      T(C): 36.6 (09-08-20 @ 12:00), Max: 36.8 (09-07-20 @ 21:08)  HR: 58 (09-08-20 @ 12:00) (51 - 62)  BP: 136/74 (09-08-20 @ 12:00) (136/74 - 154/75)  RR: 19 (09-08-20 @ 12:00) (15 - 20)  SpO2: 99% (09-08-20 @ 12:00) (96% - 99%)  Wt(kg): --    I&O's Summary    08 Sep 2020 07:01  -  08 Sep 2020 15:21  --------------------------------------------------------  IN: 0 mL / OUT: 0 mL / NET: 0 mL    Appearance: Overweight  female in no acute distress	  HEENT:   Normal oral mucosa, PERRL, EOMI	  Lymphatic: No lymphadenopathy , no edema  Cardiovascular: Normal S1 S2, No JVD, No murmurs , Peripheral pulses palpable 2+ bilaterally  Respiratory: Lungs clear to auscultation, normal effort 	  Gastrointestinal:  Soft, Non-tender, + BS	  Skin: No rashes, No ecchymoses, No cyanosis, warm to touch  Musculoskeletal: Normal range of motion, normal strength  Psychiatry:  Mood & affect appropriate    TELEMETRY: sinus radha 40s, to sinus rhythm 70-80.	    ECG:  sinus radha 40s, normal MD, LBBB 140ms.  Echo: mild LVH, normal EF despite hypertensive condition, no severe valve dysfunction.  MPI: Stress-induced global severe hypokinesis.  Diffuse patchy tracer uptake, no clear diagnosis of ischemia.  LHC : mild luminal irregularities.    ASSESSMENT/PLAN: 	70y Female with CAD, pre-diabetes, HTN, presents with atypical chest pain and hypertensive emergency.    Consider ARB + MRA neurohormonal blockade in management of her hypertension, since she has developed LV hypertrophy.  LBBB is new compared to last EKG with narrow QRS in 1/2020.  Normal MD suggests conduction defect below the bundle of His, without CHF!    Observed ambulatory telemetry shows 1:1 AV conduction up to 100bpm.  A nuclear stress test has been completed, with results concerning for 3V CAD (vasodilator-induced LV dilatation and hypokinesis).    Plan:    Outpatient (or inpatient if she stays hospitalized!) Cardiac MRI to look for Sarcoid or infiltrative CM as cause of new LBBB.    Will follow.    Phong Starks M.D.  Cardiac Electrophysiology    office [phone]  pager [phone]

## 2020-09-08 NOTE — CHART NOTE - NSCHARTNOTEFT_GEN_A_CORE
Status post Cath, Right Radial site without bleeding or hematoma.  Dressing is intact.  Positive Pulses, Capillary refill less than two seconds.  Will continue to monitor.                                                                                                                                                  GISELLA Gregory, RPA-C [id number]

## 2020-09-09 ENCOUNTER — TRANSCRIPTION ENCOUNTER (OUTPATIENT)
Age: 70
End: 2020-09-09

## 2020-09-09 VITALS
SYSTOLIC BLOOD PRESSURE: 135 MMHG | TEMPERATURE: 98 F | OXYGEN SATURATION: 97 % | HEART RATE: 60 BPM | DIASTOLIC BLOOD PRESSURE: 76 MMHG | RESPIRATION RATE: 17 BRPM

## 2020-09-09 LAB
ANION GAP SERPL CALC-SCNC: 13 MMO/L — SIGNIFICANT CHANGE UP (ref 7–14)
BUN SERPL-MCNC: 17 MG/DL — SIGNIFICANT CHANGE UP (ref 7–23)
CALCIUM SERPL-MCNC: 9.9 MG/DL — SIGNIFICANT CHANGE UP (ref 8.4–10.5)
CHLORIDE SERPL-SCNC: 102 MMOL/L — SIGNIFICANT CHANGE UP (ref 98–107)
CO2 SERPL-SCNC: 24 MMOL/L — SIGNIFICANT CHANGE UP (ref 22–31)
CREAT SERPL-MCNC: 0.69 MG/DL — SIGNIFICANT CHANGE UP (ref 0.5–1.3)
GLUCOSE SERPL-MCNC: 132 MG/DL — HIGH (ref 70–99)
HCT VFR BLD CALC: 41.4 % — SIGNIFICANT CHANGE UP (ref 34.5–45)
HGB BLD-MCNC: 13.3 G/DL — SIGNIFICANT CHANGE UP (ref 11.5–15.5)
MAGNESIUM SERPL-MCNC: 2 MG/DL — SIGNIFICANT CHANGE UP (ref 1.6–2.6)
MCHC RBC-ENTMCNC: 27 PG — SIGNIFICANT CHANGE UP (ref 27–34)
MCHC RBC-ENTMCNC: 32.1 % — SIGNIFICANT CHANGE UP (ref 32–36)
MCV RBC AUTO: 84.1 FL — SIGNIFICANT CHANGE UP (ref 80–100)
NRBC # FLD: 0 K/UL — SIGNIFICANT CHANGE UP (ref 0–0)
PHOSPHATE SERPL-MCNC: 3.4 MG/DL — SIGNIFICANT CHANGE UP (ref 2.5–4.5)
PLATELET # BLD AUTO: 307 K/UL — SIGNIFICANT CHANGE UP (ref 150–400)
PMV BLD: 9.7 FL — SIGNIFICANT CHANGE UP (ref 7–13)
POTASSIUM SERPL-MCNC: 4.1 MMOL/L — SIGNIFICANT CHANGE UP (ref 3.5–5.3)
POTASSIUM SERPL-SCNC: 4.1 MMOL/L — SIGNIFICANT CHANGE UP (ref 3.5–5.3)
RBC # BLD: 4.92 M/UL — SIGNIFICANT CHANGE UP (ref 3.8–5.2)
RBC # FLD: 13.3 % — SIGNIFICANT CHANGE UP (ref 10.3–14.5)
SODIUM SERPL-SCNC: 139 MMOL/L — SIGNIFICANT CHANGE UP (ref 135–145)
WBC # BLD: 8 K/UL — SIGNIFICANT CHANGE UP (ref 3.8–10.5)
WBC # FLD AUTO: 8 K/UL — SIGNIFICANT CHANGE UP (ref 3.8–10.5)

## 2020-09-09 PROCEDURE — 93010 ELECTROCARDIOGRAM REPORT: CPT

## 2020-09-09 RX ORDER — ATORVASTATIN CALCIUM 80 MG/1
1 TABLET, FILM COATED ORAL
Qty: 0 | Refills: 0 | DISCHARGE

## 2020-09-09 RX ORDER — HYDRALAZINE HCL 50 MG
0 TABLET ORAL
Qty: 0 | Refills: 0 | DISCHARGE

## 2020-09-09 RX ORDER — POLYETHYLENE GLYCOL 3350 17 G/17G
17 POWDER, FOR SOLUTION ORAL
Qty: 0 | Refills: 0 | DISCHARGE
Start: 2020-09-09

## 2020-09-09 RX ORDER — ACETAMINOPHEN 500 MG
2 TABLET ORAL
Qty: 0 | Refills: 0 | DISCHARGE
Start: 2020-09-09

## 2020-09-09 RX ORDER — LANOLIN ALCOHOL/MO/W.PET/CERES
1 CREAM (GRAM) TOPICAL
Qty: 0 | Refills: 0 | DISCHARGE
Start: 2020-09-09

## 2020-09-09 RX ORDER — ATORVASTATIN CALCIUM 80 MG/1
1 TABLET, FILM COATED ORAL
Qty: 30 | Refills: 0
Start: 2020-09-09 | End: 2020-10-08

## 2020-09-09 RX ORDER — SENNA PLUS 8.6 MG/1
2 TABLET ORAL
Qty: 0 | Refills: 0 | DISCHARGE
Start: 2020-09-09

## 2020-09-09 RX ORDER — OMEPRAZOLE 10 MG/1
1 CAPSULE, DELAYED RELEASE ORAL
Qty: 0 | Refills: 0 | DISCHARGE

## 2020-09-09 RX ORDER — PANTOPRAZOLE SODIUM 20 MG/1
1 TABLET, DELAYED RELEASE ORAL
Qty: 30 | Refills: 0
Start: 2020-09-09 | End: 2020-10-08

## 2020-09-09 RX ADMIN — AMLODIPINE BESYLATE 10 MILLIGRAM(S): 2.5 TABLET ORAL at 12:24

## 2020-09-09 RX ADMIN — HEPARIN SODIUM 5000 UNIT(S): 5000 INJECTION INTRAVENOUS; SUBCUTANEOUS at 05:20

## 2020-09-09 RX ADMIN — Medication 81 MILLIGRAM(S): at 12:24

## 2020-09-09 RX ADMIN — Medication 650 MILLIGRAM(S): at 08:08

## 2020-09-09 RX ADMIN — Medication 650 MILLIGRAM(S): at 00:05

## 2020-09-09 RX ADMIN — Medication 650 MILLIGRAM(S): at 09:08

## 2020-09-09 RX ADMIN — Medication 25 MILLIGRAM(S): at 12:24

## 2020-09-09 RX ADMIN — LOSARTAN POTASSIUM 100 MILLIGRAM(S): 100 TABLET, FILM COATED ORAL at 05:20

## 2020-09-09 RX ADMIN — PANTOPRAZOLE SODIUM 40 MILLIGRAM(S): 20 TABLET, DELAYED RELEASE ORAL at 06:30

## 2020-09-09 NOTE — DISCHARGE NOTE NURSING/CASE MANAGEMENT/SOCIAL WORK - PATIENT PORTAL LINK FT
You can access the FollowMyHealth Patient Portal offered by [facility] by registering at the following website: [email or web address]. By joining [employer]’s FollowMyHealth portal, you will also be able to view your health information using other applications (apps) compatible with our system.

## 2020-09-09 NOTE — PROGRESS NOTE ADULT - SUBJECTIVE AND OBJECTIVE BOX
HISTORY OF PRESENT ILLNESS:   Ms Capone follows with Dr Vital at [facility].  She is a 71yo woman with hx CAD and uncontrolled severe hypertension.  She speaks Slovak.    She presented with chest pain for a few days duration, and admitted to the CCU for management of hypertensive emergency with SBP >220mmHg.  She was bradycardic at that time with sinus heartrate in the 40s. She has a new LBBB compared to EKG from 1/2020 with narrow QRS complex.    She states she is not having any palpitations, orthopnea, LE swelling, shortness of breath.  Can walk a few blocks without D.O.E..  Has been aware of slow heartrate for a few years.  No angina at this time.  No history of lightheadedness/near-syncope or syncope. A 10 pt ROS is negative.    9/4- no interval significant changes. stable sinus radha 50's on telemetry.  no SVT or VT.  BP is improving on nitro drip.  no angina or palpitations.  reviewed echocardiogram's reassuring results w/ patient.   9/5 - no significant bradycardia, HR stable in 50s-60s. no pauses.  BP stable on oral medications. not experiencing angina or palpitations. has not been ambulating yet.  9/6 - HR stable in 50s at rest.  during activity, HR up to 100bpm with no OR prolongation or AV block.  no nausea or vomiting, no angina.  9/7 - HR and BP stable.  no nausea, vomiting or SOB.    9/8 - uneventful overnight.  finished LHC, mild luminal irregularities.  feels well.  9/9- events overnight noted.  She denies Chest pain, SOB or Palps today.      MEDICATIONS  (STANDING):  amLODIPine   Tablet 10 milliGRAM(s) Oral daily  aspirin enteric coated 81 milliGRAM(s) Oral daily  atorvastatin 40 milliGRAM(s) Oral at bedtime  heparin   Injectable 5000 Unit(s) SubCutaneous every 12 hours  hydrochlorothiazide 25 milliGRAM(s) Oral daily  influenza  Vaccine (HIGH DOSE) 0.7 milliLiter(s) IntraMuscular once  losartan 100 milliGRAM(s) Oral daily  melatonin 3 milliGRAM(s) Oral at bedtime  pantoprazole    Tablet 40 milliGRAM(s) Oral before breakfast  polyethylene glycol 3350 17 Gram(s) Oral daily  senna 2 Tablet(s) Oral at bedtime    MEDICATIONS  (PRN):  acetaminophen   Tablet .. 650 milliGRAM(s) Oral every 6 hours PRN Mild Pain (1 - 3)      LABS:                            13.3   8.00  )-----------( 307      ( 09 Sep 2020 12:32 )             41.4     Hemoglobin: 13.3 g/dL (09-09 @ 12:32)  Hemoglobin: 13.4 g/dL (09-08 @ 06:45)  Hemoglobin: 12.8 g/dL (09-07 @ 10:14)  Hemoglobin: 13.6 g/dL (09-06 @ 06:30)  Hemoglobin: 12.6 g/dL (09-05 @ 05:45)    09-08    135  |  98  |  22  ----------------------------<  103<H>  3.7   |  26  |  0.76    Ca    9.6      08 Sep 2020 06:45  Phos  4.0     09-08  Mg     2.0     09-08    Creatinine Trend: 0.76<--, 0.74<--, 0.71<--, 0.72<--, 0.73<--, 0.53<--     PHYSICAL EXAM  Vital Signs Last 24 Hrs  T(C): 36.8 (09 Sep 2020 12:22), Max: 37.1 (08 Sep 2020 16:00)  T(F): 98.3 (09 Sep 2020 12:22), Max: 98.7 (08 Sep 2020 16:00)  HR: 60 (09 Sep 2020 12:22) (55 - 60)  BP: 135/76 (09 Sep 2020 12:22) (135/76 - 158/78)  BP(mean): --  RR: 17 (09 Sep 2020 12:22) (17 - 18)  SpO2: 97% (09 Sep 2020 12:22) (96% - 98%)      Appearance: Overweight  female in no acute distress	  HEENT:   Normal oral mucosa, PERRL, EOMI	  Lymphatic: No lymphadenopathy , no edema  Cardiovascular: Normal S1 S2, No JVD, No murmurs , Peripheral pulses palpable 2+ bilaterally  Respiratory: Lungs clear to auscultation, normal effort 	  Gastrointestinal:  Soft, Non-tender, + BS	  Skin: No rashes, No ecchymoses, No cyanosis, warm to touch  Musculoskeletal: Normal range of motion, normal strength  Psychiatry:  Mood & affect appropriate    TELEMETRY: sinus radha 40s, to sinus rhythm 70-80.	    ECG:  sinus radha 40s, normal OR, LBBB 140ms.  Echo: mild LVH, normal EF despite hypertensive condition, no severe valve dysfunction.  MPI: Stress-induced global severe hypokinesis.  Diffuse patchy tracer uptake, no clear diagnosis of ischemia.  LHC : mild luminal irregularities.    ASSESSMENT/PLAN: 	70y Female with CAD, pre-diabetes, HTN, presents with atypical chest pain and hypertensive emergency.    LBBB is new compared to last EKG with narrow QRS in 1/2020.  Normal OR suggests conduction defect below the bundle of His, without CHF!    Observed ambulatory telemetry shows 1:1 AV conduction up to 100bpm.  A nuclear stress test has been completed, with results concerning for 3V CAD (vasodilator-induced LV dilatation and hypokinesis), but a LHC performed 9/8 revealed only luminal irregularities.    Plan:    Outpatient (or inpatient if she stays hospitalized!) Cardiac MRI to look for Sarcoid or infiltrative CM as cause of new LBBB.    Will follow.    f/u with dr Vital friday 9/25 at 11:40 as prev scheduled HISTORY OF PRESENT ILLNESS:   Ms Capone follows with Dr Vital at [facility].  She is a 69yo woman with hx CAD and uncontrolled severe hypertension.  She speaks German.    She presented with chest pain for a few days duration, and admitted to the CCU for management of hypertensive emergency with SBP >220mmHg.  She was bradycardic at that time with sinus heartrate in the 40s. She has a new LBBB compared to EKG from 1/2020 with narrow QRS complex.    She states she is not having any palpitations, orthopnea, LE swelling, shortness of breath.  Can walk a few blocks without D.O.E..  Has been aware of slow heartrate for a few years.  No angina at this time.  No history of lightheadedness/near-syncope or syncope. A 10 pt ROS is negative.    9/4- no interval significant changes. stable sinus radha 50's on telemetry.  no SVT or VT.  BP is improving on nitro drip.  no angina or palpitations.  reviewed echocardiogram's reassuring results w/ patient.   9/5 - no significant bradycardia, HR stable in 50s-60s. no pauses.  BP stable on oral medications. not experiencing angina or palpitations. has not been ambulating yet.  9/6 - HR stable in 50s at rest.  during activity, HR up to 100bpm with no MI prolongation or AV block.  no nausea or vomiting, no angina.  9/7 - HR and BP stable.  no nausea, vomiting or SOB.    9/8 - uneventful overnight.  finished LHC, mild luminal irregularities.  feels well.  9/9- events overnight noted.  She denies Chest pain, SOB or Palps today.      MEDICATIONS  (STANDING):  amLODIPine   Tablet 10 milliGRAM(s) Oral daily  aspirin enteric coated 81 milliGRAM(s) Oral daily  atorvastatin 40 milliGRAM(s) Oral at bedtime  heparin   Injectable 5000 Unit(s) SubCutaneous every 12 hours  hydrochlorothiazide 25 milliGRAM(s) Oral daily  influenza  Vaccine (HIGH DOSE) 0.7 milliLiter(s) IntraMuscular once  losartan 100 milliGRAM(s) Oral daily  melatonin 3 milliGRAM(s) Oral at bedtime  pantoprazole    Tablet 40 milliGRAM(s) Oral before breakfast  polyethylene glycol 3350 17 Gram(s) Oral daily  senna 2 Tablet(s) Oral at bedtime    MEDICATIONS  (PRN):  acetaminophen   Tablet .. 650 milliGRAM(s) Oral every 6 hours PRN Mild Pain (1 - 3)      LABS:                            13.3   8.00  )-----------( 307      ( 09 Sep 2020 12:32 )             41.4     Hemoglobin: 13.3 g/dL (09-09 @ 12:32)  Hemoglobin: 13.4 g/dL (09-08 @ 06:45)  Hemoglobin: 12.8 g/dL (09-07 @ 10:14)  Hemoglobin: 13.6 g/dL (09-06 @ 06:30)  Hemoglobin: 12.6 g/dL (09-05 @ 05:45)    09-08    135  |  98  |  22  ----------------------------<  103<H>  3.7   |  26  |  0.76    Ca    9.6      08 Sep 2020 06:45  Phos  4.0     09-08  Mg     2.0     09-08    Creatinine Trend: 0.76<--, 0.74<--, 0.71<--, 0.72<--, 0.73<--, 0.53<--     PHYSICAL EXAM  Vital Signs Last 24 Hrs  T(C): 36.8 (09 Sep 2020 12:22), Max: 37.1 (08 Sep 2020 16:00)  T(F): 98.3 (09 Sep 2020 12:22), Max: 98.7 (08 Sep 2020 16:00)  HR: 60 (09 Sep 2020 12:22) (55 - 60)  BP: 135/76 (09 Sep 2020 12:22) (135/76 - 158/78)  BP(mean): --  RR: 17 (09 Sep 2020 12:22) (17 - 18)  SpO2: 97% (09 Sep 2020 12:22) (96% - 98%)      Appearance: Overweight  female in no acute distress	  HEENT:   Normal oral mucosa, PERRL, EOMI	  Lymphatic: No lymphadenopathy , no edema  Cardiovascular: Normal S1 S2, No JVD, No murmurs , Peripheral pulses palpable 2+ bilaterally  Respiratory: Lungs clear to auscultation, normal effort 	  Gastrointestinal:  Soft, Non-tender, + BS	  Skin: No rashes, No ecchymoses, No cyanosis, warm to touch  Musculoskeletal: Normal range of motion, normal strength  Psychiatry:  Mood & affect appropriate    TELEMETRY: sinus radha 40s, to sinus rhythm 70-80.	    ECG:  sinus radha 40s, normal MI, LBBB 140ms.  Echo: mild LVH, normal EF despite hypertensive condition, no severe valve dysfunction.  MPI: Stress-induced global severe hypokinesis.  Diffuse patchy tracer uptake, no clear diagnosis of ischemia.  LHC : mild luminal irregularities.    ASSESSMENT/PLAN: 	70y Female with CAD, pre-diabetes, HTN, presents with atypical chest pain and hypertensive emergency.    LBBB is new compared to last EKG with narrow QRS in 1/2020.  Normal MI suggests conduction defect below the bundle of His, without CHF.    Observed ambulatory telemetry shows 1:1 AV conduction up to 100bpm.  A nuclear stress test has been completed, with results concerning for 3V CAD (vasodilator-induced LV dilatation and hypokinesis), but a LHC performed 9/8 revealed only luminal irregularities.    Plan:    Outpatient (or inpatient if she stays hospitalized!) Cardiac MRI to look for Sarcoid or infiltrative CM as cause of new LBBB.    Will follow.    f/u with dr Vital friday 9/25 at 11:40 as prev scheduled

## 2020-09-09 NOTE — DIETITIAN INITIAL EVALUATION ADULT. - OTHER INFO
Initial Dietitian Evaluation 2/2 to extended length of stay. Patient is a 70 year old female with a past medical Hx inclusive of CAD HTN, HLD, Pre-DM a/w chest pain.      Patient denies any nausea/vomiting/diarrhea/constipation or difficulty chewing and swallowing.     No weight change noted on this admission, Wt 9/2 93.1kg currently 93.4kg 9/7. Initial Dietitian Evaluation 2/2 to extended length of stay. Patient is a 70 year old female with a past medical Hx inclusive of CAD HTN, HLD, Pre-DM a/w chest pain.  Patient denies any nausea/vomiting/diarrhea/constipation or difficulty chewing and swallowing. UBW reported to be ~199lb (90.45kg) and she endorses weight loss which she attributed to fluid retention, currently with 1+ edema of the left and right wrist and ankle.  No weight change noted on this admission, Wt 9/2 93.1kg currently 93.4kg 9/7.  Pt endorses trying her best to adhere to a low Na diet prior to admission.  The Pt was provided with Heart Healthy diet education (low sodium, low cholesterol, "good fats" vs "bad fats," fiber, label reading, and meal planning) RD remains available, re-consult as needed.

## 2020-09-09 NOTE — PROGRESS NOTE ADULT - REASON FOR ADMISSION
chest pain

## 2020-09-09 NOTE — PROGRESS NOTE ADULT - SUBJECTIVE AND OBJECTIVE BOX
S: no chest pain or sob; events noted; discussed with patient via  services (ID [id number]) - pt. denies having chest pain yesterday and was upset about changing rooms; she denies any chest pain or anginal symptoms; ROS otherwise negative.        acetaminophen   Tablet .. 650 milliGRAM(s) Oral every 6 hours PRN  amLODIPine   Tablet 10 milliGRAM(s) Oral daily  aspirin enteric coated 81 milliGRAM(s) Oral daily  atorvastatin 40 milliGRAM(s) Oral at bedtime  heparin   Injectable 5000 Unit(s) SubCutaneous every 12 hours  hydrochlorothiazide 25 milliGRAM(s) Oral daily  influenza  Vaccine (HIGH DOSE) 0.7 milliLiter(s) IntraMuscular once  losartan 100 milliGRAM(s) Oral daily  melatonin 3 milliGRAM(s) Oral at bedtime  pantoprazole    Tablet 40 milliGRAM(s) Oral before breakfast  polyethylene glycol 3350 17 Gram(s) Oral daily  senna 2 Tablet(s) Oral at bedtime                            13.3   8.00  )-----------( 307      ( 09 Sep 2020 12:32 )             41.4       09-09    139  |  102  |  17  ----------------------------<  132<H>  4.1   |  24  |  0.69    Ca    9.9      09 Sep 2020 12:32  Phos  3.4     09-09  Mg     2.0     09-09              T(C): 36.8 (09-09-20 @ 12:22), Max: 37.1 (09-08-20 @ 16:00)  HR: 60 (09-09-20 @ 12:22) (55 - 60)  BP: 135/76 (09-09-20 @ 12:22) (135/76 - 158/78)  RR: 17 (09-09-20 @ 12:22) (17 - 18)  SpO2: 97% (09-09-20 @ 12:22) (96% - 98%)  Wt(kg): --    I&O's Summary    08 Sep 2020 07:01  -  09 Sep 2020 07:00  --------------------------------------------------------  IN: 0 mL / OUT: 0 mL / NET: 0 mL        Appearance: Normal	  HEENT:   Normal oral mucosa, PERRL, EOMI	  Lymphatic: No lymphadenopathy , no edema  Cardiovascular: Normal S1 S2, No JVD, No murmurs , Peripheral pulses palpable 2+ bilaterally  Respiratory: Lungs clear to auscultation, normal effort 	  Gastrointestinal:  Soft, Non-tender, + BS	  Skin: No rashes, No ecchymoses, No cyanosis, warm to touch  Musculoskeletal: Normal range of motion, normal strength  Psychiatry:  Mood & affect appropriate    TELEMETRY: SR	      DIAGNOSTIC TESTING:   < from: Transthoracic Echocardiogram (09.03.20 @ 12:41) >  1. Mitral annular calcification, otherwise normal mitral  valve. Minimal mitral regurgitation.  2. Moderately dilated left atrium.  LA volume index = 44  cc/m2.  3. Mild concentric left ventricular hypertrophy.  4. Normal left ventricular systolic function. No segmental  wall motion abnormalities.  5. Mild diastolic dysfunction (Stage I).  6. Normal right ventricular size and function.  < end of copied text >      < from: Nuclear Stress Test-Pharmacologic (09.06.20 @ 13:10) >  GATED ANALYSIS:  Post-stress gated wall motion analysis was performed (LVEF  = 31 %;LVEDV = 156 ml.), revealing hypokinesis.  ------------------------------------------------------------------------  LV/RV OBSERVATION:  Dilated LV  ------------------------------------------------------------------------  IMPRESSIONS:Abnormal Study  * Chest Pain: No chest pain with administration of  Regadenoson.  * Symptom: Nausea.  * HR Response: Appropriate.  * BP Response: Appropriate.  * Heart Rhythm: Normal Sinus Rhythm - 59 BPM.  * Conduction defects: LBBB.  * Baseline ECG: Nonspecific ST-T wave abnormality.  * ECG Abnormalities: None.  * Arrhythmia: Frequent VPDs occurred duiring Regadenoson  injection.  * Diffuse, patchy tracer uptake suggests the presence of  non-specific cardiomyopathy. No clear evidence to suggest  the presence of ischemia.  * Post-stress gated wall motion analysis was performed  (LVEF = 31 %;LVEDV = 156 ml.), revealing hypokinesis.  Dilated LV.  Conclusion:  Diffuse, patchy tracer uptake suggests the presence of  non-specific cardiomyopathy. No clear evidence to suggest  the presence of ischemia.  ------------------------------------------------------------------------  Confirmed on  9/6/2020 - 14:03:32 by Brandt Castillo MD, FACC,  LAMINE, RPVI    < end of copied text >      ASSESSMENT/PLAN: 70y Female HTN HLD LBBB admitted with HTN urgency and chest pain    -TTE with normal LV function  -cont to uptitrate antihypertensives as needed/tolerated   -NST abormal as above with dilated LV, EF 31% during stress  -given recurrent chest pain with drop in EF on stress test, recommend coronary angiogram to rule out CAD  -cardiac cath performed demonstrating minor luminal irregularities - medical management recommended  -pt. denies having chest pain overnight, ecg unchanged, and cath demonstrated only luminal irregularities  -no further cardiac workup needed at this time  -pt. does not want any further testing and wants to go home  -VS stable with good O2 sat on RA  -dc home with outpatient follow up with Dr. Vital   -discussed with family in detail     Jc Razo MD, FACC

## 2020-09-09 NOTE — DIETITIAN INITIAL EVALUATION ADULT. - PERTINENT LABORATORY DATA
09-08 Na135 mmol/L Glu 103 mg/dL<H> K+ 3.7 mmol/L Cr  0.76 mg/dL BUN 22 mg/dL 09-08 Phos 4.0 mg/dL 09-03 Alb 3.6 g/dL 09-03 Chol 89 mg/dL<L> LDL 49 mg/dL HDL 36 mg/dL<L> Trig 99 mg/dL    A1C with Estimated Average Glucose: 5.4 %

## 2020-09-09 NOTE — PROGRESS NOTE ADULT - ATTENDING COMMENTS
Agree with above  Given chest pain and drop in EF with stress, recommend cardiac cath to rule out CAD    Jc Razo MD, FACC
Agree with above  NST to rule out cad  Antihypertensives as tolerated  Supportive care per CCU    Jc Razo MD
seen and agree w/ PA.  stable for discharge.  if she has other reasons to stay hospitalized can get cMRI. Otherwise defer for outpatient setting and let her go home.
70 year old woman with history of chest pain and hypertension CAD admitted with hypertensive emergency and chest pain  EKG with LBBB  #CAD- unclear with history  Continue ASA  HsTN negative x 3  Will need eventual ischemic eval   #HTN- On Tridil gtt  On Norvasc 5 mg, Losartan 25 mg daily  On HCTZ 25 mg daily  Wean Tridil off  Check TTE

## 2020-09-09 NOTE — PROGRESS NOTE ADULT - PROVIDER SPECIALTY LIST ADULT
CCU
Cardiology
Electrophysiology
CCU

## 2020-09-09 NOTE — CHART NOTE - NSCHARTNOTEFT_GEN_A_CORE
#[id number]     Called to patient's bedside earlier in the evening because patient wanted to leave AMA since she was asked to move to a double-bed room to accommodate a sick patient on the floor that needs to be in a single-bed room. Patient was stating she did not want to leave her room and if she was going to be forced to she was going to leave but would not sign the AMA form since "we were forcing her to leave." It was explained to her and her son on the phone in grave detail the importance of staying since she just returned from cath lab. Patient stated she felt "fine" and was leaving. Patient and son were concerned that she would get COVID since they had many family members pass away in [place] from it. It was explained that her roommate is COVID negative, as are all patient's on the floor since they are swabbed upon entry. Patient and family were persistent that she was at a greater risk of getting COVID than she was from going home hours after her cath. Dr. Razo was contacted and aware. AMA papers were filled out and signed by provider, but not patient.     While patient was waiting for family to pick her up, as per nursing staff patient stated she had chest pain. EKG was done and showed no acute changes from previous EKGs. A single-bed room opened up on a different floor and patient refused to leave the floor to that room.     Patient is currently sitting in her bed and will remain there overnight. Chest pain has since resolved. Will continue to monitor the patient overnight.        Daan Miranda PA-C  Pager [phone]

## 2020-09-09 NOTE — DIETITIAN INITIAL EVALUATION ADULT. - PERTINENT MEDS FT
MEDICATIONS  (STANDING):  amLODIPine   Tablet 10 milliGRAM(s) Oral daily  aspirin enteric coated 81 milliGRAM(s) Oral daily  atorvastatin 40 milliGRAM(s) Oral at bedtime  heparin   Injectable 5000 Unit(s) SubCutaneous every 12 hours  hydrochlorothiazide 25 milliGRAM(s) Oral daily  influenza  Vaccine (HIGH DOSE) 0.7 milliLiter(s) IntraMuscular once  losartan 100 milliGRAM(s) Oral daily  melatonin 3 milliGRAM(s) Oral at bedtime  pantoprazole    Tablet 40 milliGRAM(s) Oral before breakfast  polyethylene glycol 3350 17 Gram(s) Oral daily  senna 2 Tablet(s) Oral at bedtime

## 2020-09-09 NOTE — DIETITIAN INITIAL EVALUATION ADULT. - ADD RECOMMEND
1- Continue current diet order, which remains appropriate at this time. 2- Monitor weights, labs, BM's, skin integrity, p.o. intake. 3- Please Encourage po intake, assist with meals and menu selections, provide alternatives PRN. 4- RD remains available, re-consult as needed. Tristan Rivera MS, RDN Pager #[phone]

## 2020-10-13 ENCOUNTER — OUTPATIENT (OUTPATIENT)
Dept: OUTPATIENT SERVICES | Facility: HOSPITAL | Age: 70
LOS: 1 days | Discharge: HOME | End: 2020-10-13

## 2020-10-13 DIAGNOSIS — Z98.890 OTHER SPECIFIED POSTPROCEDURAL STATES: Chronic | ICD-10-CM

## 2020-10-13 DIAGNOSIS — D64.9 ANEMIA, UNSPECIFIED: ICD-10-CM

## 2020-10-14 PROBLEM — I44.7 LEFT BUNDLE-BRANCH BLOCK, UNSPECIFIED: Chronic | Status: ACTIVE | Noted: 2020-09-02

## 2020-10-14 PROBLEM — R00.1 BRADYCARDIA, UNSPECIFIED: Chronic | Status: ACTIVE | Noted: 2020-09-02

## 2020-10-15 ENCOUNTER — APPOINTMENT (OUTPATIENT)
Dept: HEMATOLOGY ONCOLOGY | Facility: CLINIC | Age: 70
End: 2020-10-15

## 2020-11-02 ENCOUNTER — APPOINTMENT (OUTPATIENT)
Dept: HEMATOLOGY ONCOLOGY | Facility: CLINIC | Age: 70
End: 2020-11-02
Payer: MEDICARE

## 2020-11-02 ENCOUNTER — RESULT REVIEW (OUTPATIENT)
Age: 70
End: 2020-11-02

## 2020-11-02 VITALS
HEART RATE: 60 BPM | TEMPERATURE: 97.7 F | OXYGEN SATURATION: 97 % | SYSTOLIC BLOOD PRESSURE: 143 MMHG | WEIGHT: 203.69 LBS | BODY MASS INDEX: 36.09 KG/M2 | RESPIRATION RATE: 16 BRPM | DIASTOLIC BLOOD PRESSURE: 76 MMHG | HEIGHT: 62.99 IN

## 2020-11-02 LAB
BASOPHILS # BLD AUTO: 0.08 K/UL — SIGNIFICANT CHANGE UP (ref 0–0.2)
BASOPHILS NFR BLD AUTO: 1 % — SIGNIFICANT CHANGE UP (ref 0–2)
EOSINOPHIL # BLD AUTO: 1.06 K/UL — HIGH (ref 0–0.5)
EOSINOPHIL NFR BLD AUTO: 12.7 % — HIGH (ref 0–6)
HCT VFR BLD CALC: 36.6 % — SIGNIFICANT CHANGE UP (ref 34.5–45)
HGB BLD-MCNC: 11.7 G/DL — SIGNIFICANT CHANGE UP (ref 11.5–15.5)
IMM GRANULOCYTES NFR BLD AUTO: 0.5 % — SIGNIFICANT CHANGE UP (ref 0–1.5)
LYMPHOCYTES # BLD AUTO: 2.03 K/UL — SIGNIFICANT CHANGE UP (ref 1–3.3)
LYMPHOCYTES # BLD AUTO: 24.3 % — SIGNIFICANT CHANGE UP (ref 13–44)
MCHC RBC-ENTMCNC: 27.2 PG — SIGNIFICANT CHANGE UP (ref 27–34)
MCHC RBC-ENTMCNC: 32 G/DL — SIGNIFICANT CHANGE UP (ref 32–36)
MCV RBC AUTO: 85.1 FL — SIGNIFICANT CHANGE UP (ref 80–100)
MONOCYTES # BLD AUTO: 0.46 K/UL — SIGNIFICANT CHANGE UP (ref 0–0.9)
MONOCYTES NFR BLD AUTO: 5.5 % — SIGNIFICANT CHANGE UP (ref 2–14)
NEUTROPHILS # BLD AUTO: 4.67 K/UL — SIGNIFICANT CHANGE UP (ref 1.8–7.4)
NEUTROPHILS NFR BLD AUTO: 56 % — SIGNIFICANT CHANGE UP (ref 43–77)
NRBC # BLD: 0 /100 WBCS — SIGNIFICANT CHANGE UP (ref 0–0)
PLATELET # BLD AUTO: 281 K/UL — SIGNIFICANT CHANGE UP (ref 150–400)
RBC # BLD: 4.3 M/UL — SIGNIFICANT CHANGE UP (ref 3.8–5.2)
RBC # FLD: 14.2 % — SIGNIFICANT CHANGE UP (ref 10.3–14.5)
WBC # BLD: 8.34 K/UL — SIGNIFICANT CHANGE UP (ref 3.8–10.5)
WBC # FLD AUTO: 8.34 K/UL — SIGNIFICANT CHANGE UP (ref 3.8–10.5)

## 2020-11-02 PROCEDURE — 99213 OFFICE O/P EST LOW 20 MIN: CPT

## 2020-11-02 PROCEDURE — 99072 ADDL SUPL MATRL&STAF TM PHE: CPT

## 2020-11-03 LAB
ALBUMIN SERPL ELPH-MCNC: 4 G/DL
ALP BLD-CCNC: 116 U/L
ALT SERPL-CCNC: 17 U/L
ANION GAP SERPL CALC-SCNC: 10 MMOL/L
AST SERPL-CCNC: 17 U/L
BILIRUB SERPL-MCNC: 0.4 MG/DL
BUN SERPL-MCNC: 12 MG/DL
CALCIUM SERPL-MCNC: 9.1 MG/DL
CHLORIDE SERPL-SCNC: 105 MMOL/L
CO2 SERPL-SCNC: 26 MMOL/L
CREAT SERPL-MCNC: 0.63 MG/DL
GLUCOSE SERPL-MCNC: 164 MG/DL
HAPTOGLOB SERPL-MCNC: 193 MG/DL
LDH SERPL-CCNC: 141 U/L
POTASSIUM SERPL-SCNC: 3.8 MMOL/L
PROT SERPL-MCNC: 6.5 G/DL
SODIUM SERPL-SCNC: 141 MMOL/L

## 2020-11-03 RX ORDER — DONEPEZIL HYDROCHLORIDE 5 MG/1
5 TABLET, ORALLY DISINTEGRATING ORAL
Refills: 0 | Status: ACTIVE | COMMUNITY
Start: 2020-11-03

## 2020-11-03 RX ORDER — ATORVASTATIN CALCIUM 20 MG/1
20 TABLET, FILM COATED ORAL
Refills: 0 | Status: ACTIVE | COMMUNITY
Start: 2020-11-03

## 2020-11-03 RX ORDER — CANDESARTAN CILEXETIL 32 MG/1
32 TABLET ORAL
Refills: 0 | Status: ACTIVE | COMMUNITY
Start: 2020-11-03

## 2020-11-03 RX ORDER — AMLODIPINE BESYLATE 10 MG/1
10 TABLET ORAL
Refills: 0 | Status: ACTIVE | COMMUNITY
Start: 2020-11-03

## 2020-11-03 NOTE — REASON FOR VISIT
[Follow-Up Visit] : a follow-up visit for [[employer] ] : provided by [employer]   [FreeTextEntry1] : [id number] [FreeTextEntry2] : Lisandro [TWNoteComboBox1] : [place]

## 2020-11-03 NOTE — HISTORY OF PRESENT ILLNESS
[de-identified] : 66yo F w/ HTN, CAD, GERD presented to the ED on 5/2/18 with 2 days of subjective fever and 3 months of left sided ear fullness and tinnitus. Blood work showed Hgb 6.3, further w/u consistent for hemolytic anemia - elevated retic count, elevated LDH, low hapto, indirect hyperbilirubinemia, direct Paolo positive for IgG. Negative stool occult. She was started on 90 mg prednisone on 5/3/18. CT was done which did not show any malignancy. \par \par She was discharged on prednisone 90mg. Hgb 7.0 on day of discharge, received 1u PRBCs prior to discharge. \par Her children live elsewhere around the country. She lives with her .  \par \shea Went to the ED on 5/30/18 for a vesicular rash in area of erythema on chest and back - diagnosed with zoster and started on Valtrex.  [de-identified] : She is now tapered off prednisone since Aug 2018. \par \par She was in the ED on 6/26/19 with nausea/diarrhea. She is here b/c she has "cancer in the lung". \par CT A/P: Mild pancolitis of infectious or inflammatory etiology. Partially imaged right middle lobe ground glass opacity not seen on CT of 5/4/18. Finding can be further evaluated with nonemergent chest CT. \par CT chest 7/10/19: minimal peripheral reticular opacities noted in the right middle lobe. Bilateral pulmonary nodules. \par \par She reports pains in her b/l legs with walking, occasional swelling as well. She reports itching and pins/needles diffusely. She has noted new white spots on both arms and legs. \par Otherwise doing well. \par \par Patient was hospitalized on 9/2/20 in [facility] due to chest pain for several days with new LBBB in setting of HTN urgency with SBP > 240. Initial concern for STEMI based on EKG but did not meet Sgarbossa criteria. Started on IV NTG for BP control and admitted to CCU. IV NTG was tapered off and po antihypertensives were started. Cardiac enzymes were negative. ECHO with normal LV function and plan is for nuclear stress test. Patient also with asymptomatic sinus bradycardia but appropriate HR response with activity. On home donazepil per med rec but not given here ? due to side effect of bradycardia. No evidence of confusion. EP recommending ambulatory EKG monitoring. \par She presented today for f/u, admitted she feels fine overall, she will fly to [place] on 11/7, she is not sure when to come back to [place].

## 2020-11-03 NOTE — ASSESSMENT
[FreeTextEntry1] : 68yo F w/ warm AIHA, s/p prednisone \par Hgb 11.6 today, now tapered off steroids since Aug 2018. Doing well. \par f/u retic, LDH, hapto\par PMD f/u\par derm referral \par RTC 4 mo

## 2020-11-03 NOTE — PHYSICAL EXAM
[Restricted in physically strenuous activity but ambulatory and able to carry out work of a light or sedentary nature] : Status 1- Restricted in physically strenuous activity but ambulatory and able to carry out work of a light or sedentary nature, e.g., light house work, office work [Normal] : affect appropriate [de-identified] : hypopigemented lesions on b/l arms and L shin

## 2021-02-25 ENCOUNTER — OUTPATIENT (OUTPATIENT)
Dept: OUTPATIENT SERVICES | Facility: HOSPITAL | Age: 71
LOS: 1 days | Discharge: HOME | End: 2021-02-25

## 2021-02-25 DIAGNOSIS — D64.9 ANEMIA, UNSPECIFIED: ICD-10-CM

## 2021-02-25 DIAGNOSIS — Z98.890 OTHER SPECIFIED POSTPROCEDURAL STATES: Chronic | ICD-10-CM

## 2021-03-01 ENCOUNTER — RESULT REVIEW (OUTPATIENT)
Age: 71
End: 2021-03-01

## 2021-03-01 ENCOUNTER — APPOINTMENT (OUTPATIENT)
Dept: HEMATOLOGY ONCOLOGY | Facility: CLINIC | Age: 71
End: 2021-03-01
Payer: MEDICARE

## 2021-03-01 VITALS
DIASTOLIC BLOOD PRESSURE: 79 MMHG | HEIGHT: 62.6 IN | WEIGHT: 202.82 LBS | OXYGEN SATURATION: 97 % | SYSTOLIC BLOOD PRESSURE: 150 MMHG | TEMPERATURE: 97.2 F | HEART RATE: 56 BPM | BODY MASS INDEX: 36.39 KG/M2 | RESPIRATION RATE: 15 BRPM

## 2021-03-01 LAB
BASOPHILS # BLD AUTO: 0.06 K/UL — SIGNIFICANT CHANGE UP (ref 0–0.2)
BASOPHILS NFR BLD AUTO: 0.8 % — SIGNIFICANT CHANGE UP (ref 0–2)
EOSINOPHIL # BLD AUTO: 1.17 K/UL — HIGH (ref 0–0.5)
EOSINOPHIL NFR BLD AUTO: 15.4 % — HIGH (ref 0–6)
HCT VFR BLD CALC: 37 % — SIGNIFICANT CHANGE UP (ref 34.5–45)
HGB BLD-MCNC: 12.1 G/DL — SIGNIFICANT CHANGE UP (ref 11.5–15.5)
IMM GRANULOCYTES NFR BLD AUTO: 0.5 % — SIGNIFICANT CHANGE UP (ref 0–1.5)
LYMPHOCYTES # BLD AUTO: 2.04 K/UL — SIGNIFICANT CHANGE UP (ref 1–3.3)
LYMPHOCYTES # BLD AUTO: 26.8 % — SIGNIFICANT CHANGE UP (ref 13–44)
MCHC RBC-ENTMCNC: 28 PG — SIGNIFICANT CHANGE UP (ref 27–34)
MCHC RBC-ENTMCNC: 32.7 G/DL — SIGNIFICANT CHANGE UP (ref 32–36)
MCV RBC AUTO: 85.6 FL — SIGNIFICANT CHANGE UP (ref 80–100)
MONOCYTES # BLD AUTO: 0.5 K/UL — SIGNIFICANT CHANGE UP (ref 0–0.9)
MONOCYTES NFR BLD AUTO: 6.6 % — SIGNIFICANT CHANGE UP (ref 2–14)
NEUTROPHILS # BLD AUTO: 3.81 K/UL — SIGNIFICANT CHANGE UP (ref 1.8–7.4)
NEUTROPHILS NFR BLD AUTO: 49.9 % — SIGNIFICANT CHANGE UP (ref 43–77)
NRBC # BLD: 0 /100 WBCS — SIGNIFICANT CHANGE UP (ref 0–0)
PLATELET # BLD AUTO: 277 K/UL — SIGNIFICANT CHANGE UP (ref 150–400)
RBC # BLD: 4.32 M/UL — SIGNIFICANT CHANGE UP (ref 3.8–5.2)
RBC # FLD: 13 % — SIGNIFICANT CHANGE UP (ref 10.3–14.5)
WBC # BLD: 7.62 K/UL — SIGNIFICANT CHANGE UP (ref 3.8–10.5)
WBC # FLD AUTO: 7.62 K/UL — SIGNIFICANT CHANGE UP (ref 3.8–10.5)

## 2021-03-01 PROCEDURE — 99072 ADDL SUPL MATRL&STAF TM PHE: CPT

## 2021-03-01 PROCEDURE — 99213 OFFICE O/P EST LOW 20 MIN: CPT

## 2021-03-01 NOTE — REASON FOR VISIT
[Follow-Up Visit] : a follow-up visit for [[employer] ] : provided by [employer]   [FreeTextEntry1] : [id number] [FreeTextEntry2] : Shelley [TWNoteComboBox1] : [place]

## 2021-03-01 NOTE — HISTORY OF PRESENT ILLNESS
[de-identified] : 66yo F w/ HTN, CAD, GERD presented to the ED on 5/2/18 with 2 days of subjective fever and 3 months of left sided ear fullness and tinnitus. Blood work showed Hgb 6.3, further w/u consistent for hemolytic anemia - elevated retic count, elevated LDH, low hapto, indirect hyperbilirubinemia, direct Paolo positive for IgG. Negative stool occult. She was started on 90 mg prednisone on 5/3/18. CT was done which did not show any malignancy. \par \par She was discharged on prednisone 90mg. Hgb 7.0 on day of discharge, received 1u PRBCs prior to discharge. \par Her children live elsewhere around the country. She lives with her .  \par \shea Went to the ED on 5/30/18 for a vesicular rash in area of erythema on chest and back - diagnosed with zoster and started on Valtrex.  [de-identified] : She is now tapered off prednisone since Aug 2018. \par \par She was in the ED on 6/26/19 with nausea/diarrhea. She is here b/c she has "cancer in the lung". \par CT A/P: Mild pancolitis of infectious or inflammatory etiology. Partially imaged right middle lobe ground glass opacity not seen on CT of 5/4/18. Finding can be further evaluated with nonemergent chest CT. \par CT chest 7/10/19: minimal peripheral reticular opacities noted in the right middle lobe. Bilateral pulmonary nodules. \par \par She reports pains in her b/l legs with walking, occasional swelling as well. She reports itching and pins/needles diffusely. She has noted new white spots on both arms and legs. \par Otherwise doing well. \par \par Patient was hospitalized on 9/2/20 in [facility] due to chest pain for several days with new LBBB in setting of HTN urgency with SBP > 240. Initial concern for STEMI based on EKG but did not meet Sgarbossa criteria. Started on IV NTG for BP control and admitted to CCU. IV NTG was tapered off and po antihypertensives were started. Cardiac enzymes were negative. ECHO with normal LV function and plan is for nuclear stress test. Patient also with asymptomatic sinus bradycardia but appropriate HR response with activity. On home donazepil per med rec but not given here ? due to side effect of bradycardia. No evidence of confusion. EP recommending ambulatory EKG monitoring. \par She presented today for f/u, admitted she feels fine overall, she will fly to [place] on 11/7, she is not sure when to come back to [place]. \par \par Patient was seen today for f/u, denied any new onset acute complaints, she had numbness of toes and feet comes and goes for years.

## 2021-03-01 NOTE — ASSESSMENT
[FreeTextEntry1] : 70yo F w/ warm AIHA, s/p prednisone \par Hgb 12.1 today, now tapered off steroids since Aug 2018. Doing well. \par f/u retic, LDH, hapto\par PMD f/u\par RTC 4 mo\par \par \par Case and management discussed with Dr. Quan\par

## 2021-03-01 NOTE — PHYSICAL EXAM
[Restricted in physically strenuous activity but ambulatory and able to carry out work of a light or sedentary nature] : Status 1- Restricted in physically strenuous activity but ambulatory and able to carry out work of a light or sedentary nature, e.g., light house work, office work [Normal] : affect appropriate [de-identified] : hypopigemented lesions on b/l arms and L shin

## 2021-03-02 LAB
ALBUMIN SERPL ELPH-MCNC: 4.1 G/DL
ALP BLD-CCNC: 130 U/L
ALT SERPL-CCNC: 23 U/L
ANION GAP SERPL CALC-SCNC: 10 MMOL/L
AST SERPL-CCNC: 22 U/L
BILIRUB SERPL-MCNC: 0.4 MG/DL
BUN SERPL-MCNC: 13 MG/DL
CALCIUM SERPL-MCNC: 9.3 MG/DL
CHLORIDE SERPL-SCNC: 104 MMOL/L
CO2 SERPL-SCNC: 27 MMOL/L
CREAT SERPL-MCNC: 0.79 MG/DL
GLUCOSE SERPL-MCNC: 114 MG/DL
HAPTOGLOB SERPL-MCNC: 202 MG/DL
LDH SERPL-CCNC: 140 U/L
POTASSIUM SERPL-SCNC: 4.2 MMOL/L
PROT SERPL-MCNC: 7.1 G/DL
RETICS #: 72.5 K/UL — SIGNIFICANT CHANGE UP (ref 25–125)
RETICS/RBC NFR: 1.6 % — SIGNIFICANT CHANGE UP (ref 0.5–2.5)
SODIUM SERPL-SCNC: 141 MMOL/L

## 2021-04-09 ENCOUNTER — APPOINTMENT (OUTPATIENT)
Dept: DERMATOLOGY | Facility: CLINIC | Age: 71
End: 2021-04-09

## 2021-05-11 ENCOUNTER — APPOINTMENT (OUTPATIENT)
Dept: DERMATOLOGY | Facility: CLINIC | Age: 71
End: 2021-05-11
Payer: MEDICARE

## 2021-05-11 PROCEDURE — 99072 ADDL SUPL MATRL&STAF TM PHE: CPT

## 2021-05-11 PROCEDURE — 99213 OFFICE O/P EST LOW 20 MIN: CPT

## 2021-05-11 RX ORDER — TRIAMCINOLONE ACETONIDE 1 MG/G
0.1 OINTMENT TOPICAL
Qty: 1 | Refills: 2 | Status: ACTIVE | COMMUNITY
Start: 2020-06-23 | End: 1900-01-01

## 2021-06-24 NOTE — PATIENT PROFILE ADULT. - EXTENSIONS OF SELF_ADULT
Patient came in from home complaining of abdominal pain x1 day. States the pain started gradually while he was watching tv. Pain is 10/10 shooting in his lower abdomen across to both sides. History of colostomy and hernia repair 3 years ago. States the pain feels similar to when he had to get the colostomy. States he has a bowel movement yesterday. Denies painful urination. Endorsing some nausea.  A&O x4, vss.
Eyeglasses

## 2021-06-28 ENCOUNTER — OUTPATIENT (OUTPATIENT)
Dept: OUTPATIENT SERVICES | Facility: HOSPITAL | Age: 71
LOS: 1 days | Discharge: HOME | End: 2021-06-28

## 2021-06-28 DIAGNOSIS — Z98.890 OTHER SPECIFIED POSTPROCEDURAL STATES: Chronic | ICD-10-CM

## 2021-06-28 DIAGNOSIS — D64.9 ANEMIA, UNSPECIFIED: ICD-10-CM

## 2021-07-01 ENCOUNTER — APPOINTMENT (OUTPATIENT)
Dept: HEMATOLOGY ONCOLOGY | Facility: CLINIC | Age: 71
End: 2021-07-01

## 2021-09-08 ENCOUNTER — OUTPATIENT (OUTPATIENT)
Dept: OUTPATIENT SERVICES | Facility: HOSPITAL | Age: 71
LOS: 1 days | Discharge: HOME | End: 2021-09-08

## 2021-09-08 DIAGNOSIS — D64.9 ANEMIA, UNSPECIFIED: ICD-10-CM

## 2021-09-08 DIAGNOSIS — Z98.890 OTHER SPECIFIED POSTPROCEDURAL STATES: Chronic | ICD-10-CM

## 2021-09-09 ENCOUNTER — RESULT REVIEW (OUTPATIENT)
Age: 71
End: 2021-09-09

## 2021-09-09 ENCOUNTER — APPOINTMENT (OUTPATIENT)
Dept: HEMATOLOGY ONCOLOGY | Facility: CLINIC | Age: 71
End: 2021-09-09
Payer: MEDICARE

## 2021-09-09 VITALS
DIASTOLIC BLOOD PRESSURE: 75 MMHG | OXYGEN SATURATION: 97 % | HEART RATE: 63 BPM | TEMPERATURE: 97.8 F | BODY MASS INDEX: 35.21 KG/M2 | WEIGHT: 193.76 LBS | SYSTOLIC BLOOD PRESSURE: 148 MMHG | RESPIRATION RATE: 18 BRPM | HEIGHT: 62.2 IN

## 2021-09-09 DIAGNOSIS — R91.8 OTHER NONSPECIFIC ABNORMAL FINDING OF LUNG FIELD: ICD-10-CM

## 2021-09-09 LAB
BASOPHILS # BLD AUTO: 0.05 K/UL — SIGNIFICANT CHANGE UP (ref 0–0.2)
BASOPHILS NFR BLD AUTO: 0.6 % — SIGNIFICANT CHANGE UP (ref 0–2)
EOSINOPHIL # BLD AUTO: 1.33 K/UL — HIGH (ref 0–0.5)
EOSINOPHIL NFR BLD AUTO: 16.5 % — HIGH (ref 0–6)
HCT VFR BLD CALC: 38.3 % — SIGNIFICANT CHANGE UP (ref 34.5–45)
HGB BLD-MCNC: 12.2 G/DL — SIGNIFICANT CHANGE UP (ref 11.5–15.5)
IMM GRANULOCYTES NFR BLD AUTO: 0.4 % — SIGNIFICANT CHANGE UP (ref 0–1.5)
LYMPHOCYTES # BLD AUTO: 2 K/UL — SIGNIFICANT CHANGE UP (ref 1–3.3)
LYMPHOCYTES # BLD AUTO: 24.8 % — SIGNIFICANT CHANGE UP (ref 13–44)
MCHC RBC-ENTMCNC: 27.2 PG — SIGNIFICANT CHANGE UP (ref 27–34)
MCHC RBC-ENTMCNC: 31.9 G/DL — LOW (ref 32–36)
MCV RBC AUTO: 85.3 FL — SIGNIFICANT CHANGE UP (ref 80–100)
MONOCYTES # BLD AUTO: 0.35 K/UL — SIGNIFICANT CHANGE UP (ref 0–0.9)
MONOCYTES NFR BLD AUTO: 4.3 % — SIGNIFICANT CHANGE UP (ref 2–14)
NEUTROPHILS # BLD AUTO: 4.31 K/UL — SIGNIFICANT CHANGE UP (ref 1.8–7.4)
NEUTROPHILS NFR BLD AUTO: 53.4 % — SIGNIFICANT CHANGE UP (ref 43–77)
NRBC # BLD: 0 /100 WBCS — SIGNIFICANT CHANGE UP (ref 0–0)
PLATELET # BLD AUTO: 281 K/UL — SIGNIFICANT CHANGE UP (ref 150–400)
RBC # BLD: 4.49 M/UL — SIGNIFICANT CHANGE UP (ref 3.8–5.2)
RBC # FLD: 14.2 % — SIGNIFICANT CHANGE UP (ref 10.3–14.5)
RETICS #: 77.2 K/UL — SIGNIFICANT CHANGE UP (ref 25–125)
RETICS/RBC NFR: 1.7 % — SIGNIFICANT CHANGE UP (ref 0.5–2.5)
WBC # BLD: 8.07 K/UL — SIGNIFICANT CHANGE UP (ref 3.8–10.5)
WBC # FLD AUTO: 8.07 K/UL — SIGNIFICANT CHANGE UP (ref 3.8–10.5)

## 2021-09-09 PROCEDURE — 99213 OFFICE O/P EST LOW 20 MIN: CPT

## 2021-09-09 NOTE — REASON FOR VISIT
[Follow-Up Visit] : a follow-up visit for [[employer] ] : provided by [employer]   [FreeTextEntry2] : ANNA [Interpreters_IDNumber] : [id number] [Interpreters_FullName] : María [[id number]] : Ethiopian

## 2021-09-09 NOTE — HISTORY OF PRESENT ILLNESS
[de-identified] : 66yo F w/ HTN, CAD, GERD presented to the ED on 5/2/18 with 2 days of subjective fever and 3 months of left sided ear fullness and tinnitus. Blood work showed Hgb 6.3, further w/u consistent for hemolytic anemia - elevated retic count, elevated LDH, low hapto, indirect hyperbilirubinemia, direct Paolo positive for IgG. Negative stool occult. She was started on 90 mg prednisone on 5/3/18. CT was done which did not show any malignancy. \par \par She was discharged on prednisone 90mg. Hgb 7.0 on day of discharge, received 1u PRBCs prior to discharge. \par Her children live elsewhere around the country. She lives with her .  \par \shea Went to the ED on 5/30/18 for a vesicular rash in area of erythema on chest and back - diagnosed with zoster and started on Valtrex.  [de-identified] : She is now tapered off prednisone since Aug 2018. \par \par She was in the ED on 6/26/19 with nausea/diarrhea. She is here b/c she has "cancer in the lung". \par CT A/P: Mild pancolitis of infectious or inflammatory etiology. Partially imaged right middle lobe ground glass opacity not seen on CT of 5/4/18. Finding can be further evaluated with nonemergent chest CT. \par CT chest 7/10/19: minimal peripheral reticular opacities noted in the right middle lobe. Bilateral pulmonary nodules. \par \par She reports pains in her b/l legs with walking, occasional swelling as well. She reports itching and pins/needles diffusely. She has noted new white spots on both arms and legs. \par Otherwise doing well. \par \par Patient was hospitalized on 9/2/20 in [facility] due to chest pain for several days with new LBBB in setting of HTN urgency with SBP > 240. Initial concern for STEMI based on EKG but did not meet Sgarbossa criteria. Started on IV NTG for BP control and admitted to CCU. IV NTG was tapered off and po antihypertensives were started. Cardiac enzymes were negative. ECHO with normal LV function and plan is for nuclear stress test. Patient also with asymptomatic sinus bradycardia but appropriate HR response with activity. On home donazepil per med rec but not given here ? due to side effect of bradycardia. No evidence of confusion. EP recommending ambulatory EKG monitoring. \par She presented today for f/u, admitted she feels fine overall, she will fly to [place] on 11/7, she is not sure when to come back to [place]. \par \par Patient was seen today for f/u, denied any new complaints. \par She just got back from [facility] last Friday - she was there for 2 months and 15 days.

## 2021-09-09 NOTE — ASSESSMENT
[FreeTextEntry1] : 70yo F w/ warm AIHA, s/p prednisone \par Hgb 12.2 today, now tapered off steroids since Aug 2018. Doing well. \par f/u retic, LDH, hapto\par Repeat CT chest to monitor pulmonary nodules\par PMD f/u\par RTC 6 mo

## 2021-09-10 LAB
ALBUMIN SERPL ELPH-MCNC: 3.8 G/DL
ALP BLD-CCNC: 119 U/L
ALT SERPL-CCNC: 32 U/L
ANION GAP SERPL CALC-SCNC: 13 MMOL/L
AST SERPL-CCNC: 29 U/L
BILIRUB SERPL-MCNC: 0.5 MG/DL
BUN SERPL-MCNC: 12 MG/DL
CALCIUM SERPL-MCNC: 8.9 MG/DL
CHLORIDE SERPL-SCNC: 104 MMOL/L
CO2 SERPL-SCNC: 24 MMOL/L
CREAT SERPL-MCNC: 0.98 MG/DL
GLUCOSE SERPL-MCNC: 179 MG/DL
HAPTOGLOB SERPL-MCNC: 172 MG/DL
LDH SERPL-CCNC: 148 U/L
POTASSIUM SERPL-SCNC: 3.7 MMOL/L
PROT SERPL-MCNC: 6.7 G/DL
SODIUM SERPL-SCNC: 140 MMOL/L

## 2021-09-27 ENCOUNTER — APPOINTMENT (OUTPATIENT)
Dept: CT IMAGING | Facility: IMAGING CENTER | Age: 71
End: 2021-09-27
Payer: MEDICARE

## 2021-09-27 ENCOUNTER — OUTPATIENT (OUTPATIENT)
Dept: OUTPATIENT SERVICES | Facility: HOSPITAL | Age: 71
LOS: 1 days | End: 2021-09-27
Payer: COMMERCIAL

## 2021-09-27 DIAGNOSIS — Z00.8 ENCOUNTER FOR OTHER GENERAL EXAMINATION: ICD-10-CM

## 2021-09-27 DIAGNOSIS — Z98.890 OTHER SPECIFIED POSTPROCEDURAL STATES: Chronic | ICD-10-CM

## 2021-09-27 DIAGNOSIS — R91.8 OTHER NONSPECIFIC ABNORMAL FINDING OF LUNG FIELD: ICD-10-CM

## 2021-09-27 PROCEDURE — 71250 CT THORAX DX C-: CPT

## 2021-09-27 PROCEDURE — 71250 CT THORAX DX C-: CPT | Mod: 26

## 2021-10-30 ENCOUNTER — INPATIENT (INPATIENT)
Facility: HOSPITAL | Age: 71
LOS: 5 days | Discharge: HOME | End: 2021-11-05
Attending: INTERNAL MEDICINE | Admitting: INTERNAL MEDICINE
Payer: MEDICARE

## 2021-10-30 VITALS
RESPIRATION RATE: 16 BRPM | HEIGHT: 65 IN | HEART RATE: 40 BPM | DIASTOLIC BLOOD PRESSURE: 65 MMHG | SYSTOLIC BLOOD PRESSURE: 192 MMHG | OXYGEN SATURATION: 99 % | TEMPERATURE: 97 F

## 2021-10-30 DIAGNOSIS — I10 ESSENTIAL (PRIMARY) HYPERTENSION: ICD-10-CM

## 2021-10-30 DIAGNOSIS — R00.1 BRADYCARDIA, UNSPECIFIED: ICD-10-CM

## 2021-10-30 DIAGNOSIS — R07.9 CHEST PAIN, UNSPECIFIED: ICD-10-CM

## 2021-10-30 DIAGNOSIS — E78.5 HYPERLIPIDEMIA, UNSPECIFIED: ICD-10-CM

## 2021-10-30 DIAGNOSIS — Z98.890 OTHER SPECIFIED POSTPROCEDURAL STATES: Chronic | ICD-10-CM

## 2021-10-30 LAB
ALBUMIN SERPL ELPH-MCNC: 3.9 G/DL — SIGNIFICANT CHANGE UP (ref 3.3–5)
ALP SERPL-CCNC: 112 U/L — SIGNIFICANT CHANGE UP (ref 40–120)
ALT FLD-CCNC: 24 U/L — SIGNIFICANT CHANGE UP (ref 4–33)
ANION GAP SERPL CALC-SCNC: 10 MMOL/L — SIGNIFICANT CHANGE UP (ref 7–14)
ANION GAP SERPL CALC-SCNC: 11 MMOL/L — SIGNIFICANT CHANGE UP (ref 7–14)
AST SERPL-CCNC: 31 U/L — SIGNIFICANT CHANGE UP (ref 4–32)
BILIRUB SERPL-MCNC: 0.6 MG/DL — SIGNIFICANT CHANGE UP (ref 0.2–1.2)
BUN SERPL-MCNC: 12 MG/DL — SIGNIFICANT CHANGE UP (ref 7–23)
BUN SERPL-MCNC: 14 MG/DL — SIGNIFICANT CHANGE UP (ref 7–23)
CALCIUM SERPL-MCNC: 9 MG/DL — SIGNIFICANT CHANGE UP (ref 8.4–10.5)
CALCIUM SERPL-MCNC: 9.3 MG/DL — SIGNIFICANT CHANGE UP (ref 8.4–10.5)
CHLORIDE SERPL-SCNC: 105 MMOL/L — SIGNIFICANT CHANGE UP (ref 98–107)
CHLORIDE SERPL-SCNC: 106 MMOL/L — SIGNIFICANT CHANGE UP (ref 98–107)
CO2 SERPL-SCNC: 25 MMOL/L — SIGNIFICANT CHANGE UP (ref 22–31)
CO2 SERPL-SCNC: 27 MMOL/L — SIGNIFICANT CHANGE UP (ref 22–31)
CREAT SERPL-MCNC: 0.61 MG/DL — SIGNIFICANT CHANGE UP (ref 0.5–1.3)
CREAT SERPL-MCNC: 0.68 MG/DL — SIGNIFICANT CHANGE UP (ref 0.5–1.3)
CRP SERPL-MCNC: 5.9 MG/L — HIGH
ERYTHROCYTE [SEDIMENTATION RATE] IN BLOOD: 18 MM/HR — SIGNIFICANT CHANGE UP (ref 4–25)
GLUCOSE SERPL-MCNC: 120 MG/DL — HIGH (ref 70–99)
GLUCOSE SERPL-MCNC: 99 MG/DL — SIGNIFICANT CHANGE UP (ref 70–99)
HCT VFR BLD CALC: 36.2 % — SIGNIFICANT CHANGE UP (ref 34.5–45)
HGB BLD-MCNC: 12.1 G/DL — SIGNIFICANT CHANGE UP (ref 11.5–15.5)
MAGNESIUM SERPL-MCNC: 2.1 MG/DL — SIGNIFICANT CHANGE UP (ref 1.6–2.6)
MCHC RBC-ENTMCNC: 28.3 PG — SIGNIFICANT CHANGE UP (ref 27–34)
MCHC RBC-ENTMCNC: 33.4 GM/DL — SIGNIFICANT CHANGE UP (ref 32–36)
MCV RBC AUTO: 84.8 FL — SIGNIFICANT CHANGE UP (ref 80–100)
NRBC # BLD: 0 /100 WBCS — SIGNIFICANT CHANGE UP
NRBC # FLD: 0 K/UL — SIGNIFICANT CHANGE UP
PHOSPHATE SERPL-MCNC: 3.6 MG/DL — SIGNIFICANT CHANGE UP (ref 2.5–4.5)
PLATELET # BLD AUTO: 258 K/UL — SIGNIFICANT CHANGE UP (ref 150–400)
POTASSIUM SERPL-MCNC: 3.6 MMOL/L — SIGNIFICANT CHANGE UP (ref 3.5–5.3)
POTASSIUM SERPL-MCNC: 4.2 MMOL/L — SIGNIFICANT CHANGE UP (ref 3.5–5.3)
POTASSIUM SERPL-SCNC: 3.6 MMOL/L — SIGNIFICANT CHANGE UP (ref 3.5–5.3)
POTASSIUM SERPL-SCNC: 4.2 MMOL/L — SIGNIFICANT CHANGE UP (ref 3.5–5.3)
PROT SERPL-MCNC: 7.1 G/DL — SIGNIFICANT CHANGE UP (ref 6–8.3)
RBC # BLD: 4.27 M/UL — SIGNIFICANT CHANGE UP (ref 3.8–5.2)
RBC # FLD: 14.2 % — SIGNIFICANT CHANGE UP (ref 10.3–14.5)
SARS-COV-2 RNA SPEC QL NAA+PROBE: SIGNIFICANT CHANGE UP
SODIUM SERPL-SCNC: 141 MMOL/L — SIGNIFICANT CHANGE UP (ref 135–145)
SODIUM SERPL-SCNC: 143 MMOL/L — SIGNIFICANT CHANGE UP (ref 135–145)
T3 SERPL-MCNC: 127 NG/DL — SIGNIFICANT CHANGE UP (ref 80–200)
T4 AB SER-ACNC: 7.25 UG/DL — SIGNIFICANT CHANGE UP (ref 5.1–13)
TROPONIN T, HIGH SENSITIVITY RESULT: 17 NG/L — SIGNIFICANT CHANGE UP
TSH SERPL-MCNC: 5.91 UIU/ML — HIGH (ref 0.27–4.2)
TSH SERPL-MCNC: 6.25 UIU/ML — HIGH (ref 0.27–4.2)
WBC # BLD: 9.82 K/UL — SIGNIFICANT CHANGE UP (ref 3.8–10.5)
WBC # FLD AUTO: 9.82 K/UL — SIGNIFICANT CHANGE UP (ref 3.8–10.5)

## 2021-10-30 PROCEDURE — 99285 EMERGENCY DEPT VISIT HI MDM: CPT | Mod: 25

## 2021-10-30 PROCEDURE — 93010 ELECTROCARDIOGRAM REPORT: CPT

## 2021-10-30 PROCEDURE — 71046 X-RAY EXAM CHEST 2 VIEWS: CPT | Mod: 26

## 2021-10-30 PROCEDURE — 99223 1ST HOSP IP/OBS HIGH 75: CPT

## 2021-10-30 RX ORDER — ACETAMINOPHEN 500 MG
650 TABLET ORAL ONCE
Refills: 0 | Status: COMPLETED | OUTPATIENT
Start: 2021-10-30 | End: 2021-10-30

## 2021-10-30 RX ORDER — LANOLIN ALCOHOL/MO/W.PET/CERES
3 CREAM (GRAM) TOPICAL AT BEDTIME
Refills: 0 | Status: DISCONTINUED | OUTPATIENT
Start: 2021-10-30 | End: 2021-11-05

## 2021-10-30 RX ORDER — HYDROCHLOROTHIAZIDE 25 MG
25 TABLET ORAL DAILY
Refills: 0 | Status: DISCONTINUED | OUTPATIENT
Start: 2021-10-30 | End: 2021-11-05

## 2021-10-30 RX ORDER — ATORVASTATIN CALCIUM 80 MG/1
20 TABLET, FILM COATED ORAL AT BEDTIME
Refills: 0 | Status: DISCONTINUED | OUTPATIENT
Start: 2021-10-30 | End: 2021-11-05

## 2021-10-30 RX ORDER — LOSARTAN POTASSIUM 100 MG/1
100 TABLET, FILM COATED ORAL DAILY
Refills: 0 | Status: DISCONTINUED | OUTPATIENT
Start: 2021-10-31 | End: 2021-11-05

## 2021-10-30 RX ORDER — HYDRALAZINE HCL 50 MG
25 TABLET ORAL THREE TIMES A DAY
Refills: 0 | Status: DISCONTINUED | OUTPATIENT
Start: 2021-10-30 | End: 2021-11-05

## 2021-10-30 RX ORDER — METOCLOPRAMIDE HCL 10 MG
5 TABLET ORAL ONCE
Refills: 0 | Status: COMPLETED | OUTPATIENT
Start: 2021-10-30 | End: 2021-10-31

## 2021-10-30 RX ORDER — HYDRALAZINE HCL 50 MG
50 TABLET ORAL ONCE
Refills: 0 | Status: COMPLETED | OUTPATIENT
Start: 2021-10-30 | End: 2021-10-30

## 2021-10-30 RX ORDER — NITROGLYCERIN 6.5 MG
0.4 CAPSULE, EXTENDED RELEASE ORAL
Refills: 0 | Status: DISCONTINUED | OUTPATIENT
Start: 2021-10-30 | End: 2021-11-05

## 2021-10-30 RX ORDER — NIFEDIPINE 30 MG
30 TABLET, EXTENDED RELEASE 24 HR ORAL DAILY
Refills: 0 | Status: DISCONTINUED | OUTPATIENT
Start: 2021-10-30 | End: 2021-11-05

## 2021-10-30 RX ORDER — DONEPEZIL HYDROCHLORIDE 10 MG/1
5 TABLET, FILM COATED ORAL AT BEDTIME
Refills: 0 | Status: DISCONTINUED | OUTPATIENT
Start: 2021-10-30 | End: 2021-11-05

## 2021-10-30 RX ORDER — ASPIRIN/CALCIUM CARB/MAGNESIUM 324 MG
1 TABLET ORAL
Qty: 0 | Refills: 0 | DISCHARGE

## 2021-10-30 RX ORDER — CANDESARTAN CILEXETIL 8 MG/1
1 TABLET ORAL
Qty: 0 | Refills: 0 | DISCHARGE

## 2021-10-30 RX ADMIN — Medication 650 MILLIGRAM(S): at 18:24

## 2021-10-30 RX ADMIN — Medication 50 MILLIGRAM(S): at 15:39

## 2021-10-30 RX ADMIN — Medication 650 MILLIGRAM(S): at 17:38

## 2021-10-30 RX ADMIN — ATORVASTATIN CALCIUM 20 MILLIGRAM(S): 80 TABLET, FILM COATED ORAL at 22:21

## 2021-10-30 RX ADMIN — Medication 25 MILLIGRAM(S): at 22:21

## 2021-10-30 RX ADMIN — Medication 3 MILLIGRAM(S): at 22:21

## 2021-10-30 RX ADMIN — DONEPEZIL HYDROCHLORIDE 5 MILLIGRAM(S): 10 TABLET, FILM COATED ORAL at 23:23

## 2021-10-30 NOTE — ED PROVIDER NOTE - ATTENDING CONTRIBUTION TO CARE
I performed a face-to-face evaluation of the patient and performed a history and physical examination. I agree with the history and physical examination.    CP worse when lying flat. Noted to have , HR ~45. Check TSH (low HR), CXR, EKG, trop, ESR/CRP. Likely admit vs. CDU. I performed a face-to-face evaluation of the patient and performed a history and physical examination. I agree with the history and physical examination.    CP worse when lying flat. Noted to have , HR ~45. Check TSH and Lyme titer (low HR), CXR, EKG, trop, ESR/CRP. Likely admit vs. CDU.

## 2021-10-30 NOTE — PHARMACOTHERAPY INTERVENTION NOTE - COMMENTS
Meds verified with patient's pharmacy ([facility])  attempted to reach patient's daughter, left message awaiting call back

## 2021-10-30 NOTE — ED PROVIDER NOTE - SECONDARY DIAGNOSIS.
HTN (hypertension) Detail Level: Detailed Quality 130: Documentation Of Current Medications In The Medical Record: Current Medications Documented

## 2021-10-30 NOTE — H&P ADULT - PROBLEM SELECTOR PLAN 2
-Reviewed pt's med list. Pharmacist med rec also noted. Pt shown to be on amlodipine and Zetia. However, pt did not have that with her collection of meds. She indicated she only takes what she had on hand.  -Hold bisoprolol for now.  -Continue to monitor on telemetry.  -Ensure lytes are repleted - K>4, Mg>2.  -TSH elevated -- will repeat in AM and send FT4, TT3.

## 2021-10-30 NOTE — H&P ADULT - PROBLEM SELECTOR PLAN 4
Stable. Cont statin.  -Clarify if pt is on Zetia.  -Check lipid panel in AM.      DVT ppx-   Low risk, low IMPROVE score. Early and progressive ambulation.

## 2021-10-30 NOTE — ED ADULT NURSE NOTE - OBJECTIVE STATEMENT
pt received to trauma A c/o chest pain x 2 weeks with elevated BP despite compliance with medications. also c/o headache and nausea. pt noted to be bradycardic on cardiac monitor. pt respirations even and unlabored on room air. denies SOB, fever, chills, cough. no edema noted. abdomen soft, nontender. left AC 18g PIV placed. labs collected and sent. EKG completed, placed in chart. pt pending cxr.

## 2021-10-30 NOTE — ED ADULT TRIAGE NOTE - CHIEF COMPLAINT QUOTE
c/o mild chest discomfort and headaches x 2 weeks. Pt states BP readings has been elevated SBP 190s at home. HR 40-52 in triage

## 2021-10-30 NOTE — H&P ADULT - NSHPPHYSICALEXAM_GEN_ALL_CORE
Vital Signs Last 24 Hrs  T(C): 36.8 (30 Oct 2021 17:22), Max: 36.8 (30 Oct 2021 17:22)  T(F): 98.3 (30 Oct 2021 17:22), Max: 98.3 (30 Oct 2021 17:22)  HR: 48 (30 Oct 2021 17:22) (40 - 58)  BP: 154/51 (30 Oct 2021 17:22) (154/51 - 195/71)  BP(mean): --  RR: 13 (30 Oct 2021 17:22) (13 - 18)  SpO2: 99% (30 Oct 2021 17:22) (99% - 100%)      Gen- In bed, comfortable, NAD  Eyes- EOMI, PERRLA, nonicteric.  EMNT- Fair dentition. MMM. No tonsilar exudates. No posterior pharynx erythema.  Neck- Supple. No masses. No tracheal deviation.  Resp- CTAB, good effort. No r/r/w. No accessory muscle use.  CVS- RRR, S1S2, no g/r/m. No LE edema.  GI- Soft abd, NT, ND, +BSx4. No HSM.  MSK- No C/C. ROM intact. No crepitus.  Neuro- CN II-XII intact. Speech fluent/face symmetric. Sensation intact.  Skin- No rashes/ulcers. Warm/moist.  Psych- AAOx3. Appropriate mood/affect.

## 2021-10-30 NOTE — H&P ADULT - ASSESSMENT
71F with PMH of HTN, bradycardia, LBBB who presents to the [facility] with elevated BP. Initial labs unremarkable. Troponin delta negative. Lower suspicion for ACS. Anginal sx could be related to stress given recent death of sister. However, pt also noted to bradycardic (which appears unchanged from prior). Admit for further cardiac workup.

## 2021-10-30 NOTE — ED PROVIDER NOTE - OBJECTIVE STATEMENT
Juan F: H/o HTN, had 3 wks amb. BP monitoring (doesn't know results). P/w sub-sternal chest pressure when lays down x 3 nights. No change in chronic heavy sweating (daytime, every day, or in chronic back pain. Not likely PNA: no infectious Sx (eg, purulent cough). Not likely PE or other VTE: PERC or Wells low score, EKG no e/o R-heart strain. CP not pleuritic. 1 wk of HTN at home () despite meds (Hydralazine 25 mg TID, Bisoprol 5 mg, Candesartan). No radiation down back. HR ~45. Juan F: H/o HTN, had 3 wks amb. BP monitoring (doesn't know results). P/w sub-sternal chest pressure when lays down x 3 nights. No change in chronic heavy sweating (daytime, every day, or in chronic back pain. Not likely PNA: no infectious Sx (eg, purulent cough). Not likely PE or other VTE: PERC or Wells low score, EKG no e/o R-heart strain. CP not pleuritic. 1 wk of HTN at home () despite meds (Hydralazine 25 mg TID, Bisoprol 5 mg, Candesartan). No radiation down back. HR ~45. Recent chest CT unremarkable.

## 2021-10-30 NOTE — ED ADULT NURSE REASSESSMENT NOTE - NS ED NURSE REASSESS COMMENT FT1
ACP called regarding pt HR. Dr. Bhatt paged since ACP not signed out yet. Dr. Bhatt aware of HR and BP. pt w/o complaints. reports relief of headache. no immediate interventions. pending bed assignment. will reassess.
pt c/o headache 7/10. SHERRON jones aware. PO tylenol ordered. respirations even and unlabored on room air. Left 18G IV WNL. pt admitted. Ye on cardiac monitor. no additional complaints.

## 2021-10-30 NOTE — H&P ADULT - HISTORY OF PRESENT ILLNESS
71F with PMH of HTN, bradycardia, LBBB who presents to the comes the hospitals with "elevated blood pressure." States that over the past week, her pressure has been persistently high. Last check, her SBP was 199. She could not recall her DBP. What prompted her to come was an acute onset of substernal chest pain, that radiated to her back. Episode lasted "not too long."   Pain was not severe as per pt. She reports compliance w/ meds. Pain went away on its own. She denied any aggravating factors. No chest pain currently. However, she attributes the death of her sister recently as an inciting event for her troubles with her BP. Pt also reports following with cardio where they were monitoring her for a pacemaker. She has since returned the device to the office. She does not know the results. Otherwise, she denies any recent travels, sick contacts, f/c, NV, abd pain, diarrhea, dysuria.     ED Course: 192/65, 40, 16, 97.3F, 99% RA. Received Hydralazine 50 PO and APAP 650.

## 2021-10-30 NOTE — ED PROVIDER NOTE - CLINICAL SUMMARY MEDICAL DECISION MAKING FREE TEXT BOX
Juan F: CP worse when lying flat. Noted to have , HR ~45. Check TSH (low HR), CXR, EKG, trop, ESR/CRP. Likely admit vs. CDU. Juan F: CP worse when lying flat. Noted to have , HR ~45. Check TSH and Lyme titer (low HR), CXR, EKG, trop, ESR/CRP. Likely admit vs. CDU.

## 2021-10-30 NOTE — H&P ADULT - NSHPLABSRESULTS_GEN_ALL_CORE
12.1   9.82  )-----------( 258      ( 30 Oct 2021 13:43 )             36.2     10-30    141  |  105  |  14  ----------------------------<  120<H>  4.2   |  25  |  0.68    Ca    9.0      30 Oct 2021 13:43    TPro  7.1  /  Alb  3.9  /  TBili  0.6  /  DBili  x   /  AST  31  /  ALT  24  /  AlkPhos  112  10-30        Troponin- 20-> 17    TSH 6.25    EKG - personally reviewed/interpreted.   Sinus radha 55. QTc 501. No acute ST/T changes. Old LBBB.      Chest XR-personally reviewed/interpreted.  No acute cardiopulmonary process. Previously seen right sided opacity again demonstrated.

## 2021-10-30 NOTE — ED ADULT NURSE NOTE - NSICDXFAMILYHX_GEN_ALL_CORE_FT
FAMILY HISTORY:  Family history of diabetes mellitus in father  Family history of hypertension in father  Family history of ischemic heart disease    Sibling  Still living? Unknown  Family history of breast cancer, Age at diagnosis: Age Unknown  Family history of malignant neoplasm of uterus, Age at diagnosis: Age Unknown

## 2021-10-30 NOTE — ED PROVIDER NOTE - PHYSICAL EXAMINATION
Well appearing, well nourished, awake, alert, oriented to person, place, time/situation and in no apparent distress.    Airway patent    Eyes without scleral injection. No jaundice.    Strong pulse. No M/R/G.    Respirations unlabored. Lungs clear.     Abdomen soft, non-tender, no guarding.    Spine appears normal, range of motion is not limited, no muscle or joint tenderness. Trace (B) LE edema.    Alert and oriented, no gross motor or sensory deficits.    Skin normal color for race, warm, dry and intact. No evidence of rash.    No SI/HI.

## 2021-10-30 NOTE — H&P ADULT - NSHPSOCIALHISTORY_GEN_ALL_CORE
Denies any tobacco, drug, ETOH use.  Independent w/ ADLs. Does not require assistive device.  , lives w/ spouse and son.

## 2021-10-30 NOTE — H&P ADULT - PROBLEM SELECTOR PLAN 3
-BP improved since coming in.  -Resume all home meds - she took AM doses already. Will reschedule accordingly.  -Cont hydralazine, candesartan.  -Re-evaluate if pt is on amlodipine.   -Holding bisoprolol.

## 2021-10-30 NOTE — H&P ADULT - PROBLEM SELECTOR PLAN 1
No active CP. Trop delta neg. Lower suspicion for ACS. Possible relation to symptomatic bradycardia?  -Continue symptomatic management.  -Will f/u with cardio/EP recommendations.  Pt seems to have been evaluated for PPM. She mentioned having a device for monitoring (loop recorder?). Allscript/HIE has no additional info. Will f/u with cards for additional details.  -Will check ECHO -- last was from >1 yr ago.  -Consider stress testing as per cards, last also from 1 yr ago.

## 2021-10-31 LAB
ANION GAP SERPL CALC-SCNC: 8 MMOL/L — SIGNIFICANT CHANGE UP (ref 7–14)
B BURGDOR C6 AB SER-ACNC: NEGATIVE — SIGNIFICANT CHANGE UP
B BURGDOR IGG+IGM SER-ACNC: 0.17 INDEX — SIGNIFICANT CHANGE UP (ref 0.01–0.89)
BUN SERPL-MCNC: 16 MG/DL — SIGNIFICANT CHANGE UP (ref 7–23)
CALCIUM SERPL-MCNC: 9.3 MG/DL — SIGNIFICANT CHANGE UP (ref 8.4–10.5)
CHLORIDE SERPL-SCNC: 106 MMOL/L — SIGNIFICANT CHANGE UP (ref 98–107)
CHOLEST SERPL-MCNC: 108 MG/DL — SIGNIFICANT CHANGE UP
CO2 SERPL-SCNC: 27 MMOL/L — SIGNIFICANT CHANGE UP (ref 22–31)
COVID-19 SPIKE DOMAIN AB INTERP: POSITIVE
COVID-19 SPIKE DOMAIN ANTIBODY RESULT: >250 U/ML — HIGH
CREAT SERPL-MCNC: 0.69 MG/DL — SIGNIFICANT CHANGE UP (ref 0.5–1.3)
GLUCOSE SERPL-MCNC: 96 MG/DL — SIGNIFICANT CHANGE UP (ref 70–99)
HDLC SERPL-MCNC: 39 MG/DL — LOW
LIPID PNL WITH DIRECT LDL SERPL: 51 MG/DL — SIGNIFICANT CHANGE UP
MAGNESIUM SERPL-MCNC: 2.1 MG/DL — SIGNIFICANT CHANGE UP (ref 1.6–2.6)
NON HDL CHOLESTEROL: 69 MG/DL — SIGNIFICANT CHANGE UP
PHOSPHATE SERPL-MCNC: 3.6 MG/DL — SIGNIFICANT CHANGE UP (ref 2.5–4.5)
POTASSIUM SERPL-MCNC: 3.8 MMOL/L — SIGNIFICANT CHANGE UP (ref 3.5–5.3)
POTASSIUM SERPL-SCNC: 3.8 MMOL/L — SIGNIFICANT CHANGE UP (ref 3.5–5.3)
SARS-COV-2 IGG+IGM SERPL QL IA: >250 U/ML — HIGH
SARS-COV-2 IGG+IGM SERPL QL IA: POSITIVE
SODIUM SERPL-SCNC: 141 MMOL/L — SIGNIFICANT CHANGE UP (ref 135–145)
TRIGL SERPL-MCNC: 89 MG/DL — SIGNIFICANT CHANGE UP

## 2021-10-31 PROCEDURE — 93010 ELECTROCARDIOGRAM REPORT: CPT

## 2021-10-31 RX ORDER — ACETAMINOPHEN 500 MG
650 TABLET ORAL ONCE
Refills: 0 | Status: COMPLETED | OUTPATIENT
Start: 2021-10-31 | End: 2021-10-31

## 2021-10-31 RX ADMIN — Medication 25 MILLIGRAM(S): at 14:31

## 2021-10-31 RX ADMIN — Medication 25 MILLIGRAM(S): at 06:11

## 2021-10-31 RX ADMIN — Medication 25 MILLIGRAM(S): at 21:26

## 2021-10-31 RX ADMIN — Medication 650 MILLIGRAM(S): at 11:06

## 2021-10-31 RX ADMIN — Medication 30 MILLIGRAM(S): at 06:11

## 2021-10-31 RX ADMIN — Medication 5 MILLIGRAM(S): at 00:26

## 2021-10-31 RX ADMIN — Medication 3 MILLIGRAM(S): at 21:26

## 2021-10-31 RX ADMIN — ATORVASTATIN CALCIUM 20 MILLIGRAM(S): 80 TABLET, FILM COATED ORAL at 21:27

## 2021-10-31 RX ADMIN — LOSARTAN POTASSIUM 100 MILLIGRAM(S): 100 TABLET, FILM COATED ORAL at 06:11

## 2021-10-31 RX ADMIN — Medication 650 MILLIGRAM(S): at 12:06

## 2021-10-31 RX ADMIN — DONEPEZIL HYDROCHLORIDE 5 MILLIGRAM(S): 10 TABLET, FILM COATED ORAL at 21:26

## 2021-10-31 NOTE — PATIENT PROFILE ADULT - IS PATIENT POST-MENOPAUSAL?
----- Message from Pierre Elliott sent at 10/13/2021  4:45 PM EDT -----  Subject: Referral Request    QUESTIONS   Reason for referral request? pt needs referral for his throat. pt does not   remember who it is supposed to be with. Please confirm with provider and   contact pt or pt's wife. pt is hard of hearing. Has the physician seen you for this condition before? Yes  Select a date? 2021-09-07  Select the Provider the patient wants to be referred to, if known (PCP or   Specialist)? Outside Physician - Mireya Palomino   Preferred Specialist (if applicable)? Do you already have an appointment scheduled? No  Additional Information for Provider?   ---------------------------------------------------------------------------  --------------  CALL BACK INFO  What is the best way for the office to contact you? OK to leave message on   voicemail  Preferred Call Back Phone Number?  [phone] yes

## 2021-11-01 LAB
ANION GAP SERPL CALC-SCNC: 12 MMOL/L — SIGNIFICANT CHANGE UP (ref 7–14)
BASOPHILS # BLD AUTO: 0.1 K/UL — SIGNIFICANT CHANGE UP (ref 0–0.2)
BASOPHILS NFR BLD AUTO: 0.8 % — SIGNIFICANT CHANGE UP (ref 0–2)
BUN SERPL-MCNC: 14 MG/DL — SIGNIFICANT CHANGE UP (ref 7–23)
CALCIUM SERPL-MCNC: 9.8 MG/DL — SIGNIFICANT CHANGE UP (ref 8.4–10.5)
CHLORIDE SERPL-SCNC: 102 MMOL/L — SIGNIFICANT CHANGE UP (ref 98–107)
CO2 SERPL-SCNC: 24 MMOL/L — SIGNIFICANT CHANGE UP (ref 22–31)
CREAT SERPL-MCNC: 0.63 MG/DL — SIGNIFICANT CHANGE UP (ref 0.5–1.3)
EOSINOPHIL # BLD AUTO: 1.43 K/UL — HIGH (ref 0–0.5)
EOSINOPHIL NFR BLD AUTO: 12.1 % — HIGH (ref 0–6)
GLUCOSE SERPL-MCNC: 111 MG/DL — HIGH (ref 70–99)
HCT VFR BLD CALC: 43.9 % — SIGNIFICANT CHANGE UP (ref 34.5–45)
HGB BLD-MCNC: 14.3 G/DL — SIGNIFICANT CHANGE UP (ref 11.5–15.5)
IANC: 7.05 K/UL — SIGNIFICANT CHANGE UP (ref 1.5–8.5)
IMM GRANULOCYTES NFR BLD AUTO: 0.2 % — SIGNIFICANT CHANGE UP (ref 0–1.5)
LYMPHOCYTES # BLD AUTO: 2.57 K/UL — SIGNIFICANT CHANGE UP (ref 1–3.3)
LYMPHOCYTES # BLD AUTO: 21.8 % — SIGNIFICANT CHANGE UP (ref 13–44)
MCHC RBC-ENTMCNC: 27.4 PG — SIGNIFICANT CHANGE UP (ref 27–34)
MCHC RBC-ENTMCNC: 32.6 GM/DL — SIGNIFICANT CHANGE UP (ref 32–36)
MCV RBC AUTO: 84.1 FL — SIGNIFICANT CHANGE UP (ref 80–100)
MONOCYTES # BLD AUTO: 0.6 K/UL — SIGNIFICANT CHANGE UP (ref 0–0.9)
MONOCYTES NFR BLD AUTO: 5.1 % — SIGNIFICANT CHANGE UP (ref 2–14)
NEUTROPHILS # BLD AUTO: 7.05 K/UL — SIGNIFICANT CHANGE UP (ref 1.8–7.4)
NEUTROPHILS NFR BLD AUTO: 60 % — SIGNIFICANT CHANGE UP (ref 43–77)
NRBC # BLD: 0 /100 WBCS — SIGNIFICANT CHANGE UP
NRBC # FLD: 0 K/UL — SIGNIFICANT CHANGE UP
PLATELET # BLD AUTO: 289 K/UL — SIGNIFICANT CHANGE UP (ref 150–400)
POTASSIUM SERPL-MCNC: 4 MMOL/L — SIGNIFICANT CHANGE UP (ref 3.5–5.3)
POTASSIUM SERPL-SCNC: 4 MMOL/L — SIGNIFICANT CHANGE UP (ref 3.5–5.3)
RBC # BLD: 5.22 M/UL — HIGH (ref 3.8–5.2)
RBC # FLD: 14.2 % — SIGNIFICANT CHANGE UP (ref 10.3–14.5)
SODIUM SERPL-SCNC: 138 MMOL/L — SIGNIFICANT CHANGE UP (ref 135–145)
WBC # BLD: 11.77 K/UL — HIGH (ref 3.8–10.5)
WBC # FLD AUTO: 11.77 K/UL — HIGH (ref 3.8–10.5)

## 2021-11-01 RX ORDER — SENNA PLUS 8.6 MG/1
2 TABLET ORAL AT BEDTIME
Refills: 0 | Status: DISCONTINUED | OUTPATIENT
Start: 2021-11-01 | End: 2021-11-05

## 2021-11-01 RX ORDER — POLYETHYLENE GLYCOL 3350 17 G/17G
17 POWDER, FOR SOLUTION ORAL
Refills: 0 | Status: DISCONTINUED | OUTPATIENT
Start: 2021-11-01 | End: 2021-11-05

## 2021-11-01 RX ADMIN — POLYETHYLENE GLYCOL 3350 17 GRAM(S): 17 POWDER, FOR SOLUTION ORAL at 21:08

## 2021-11-01 RX ADMIN — Medication 25 MILLIGRAM(S): at 05:10

## 2021-11-01 RX ADMIN — DONEPEZIL HYDROCHLORIDE 5 MILLIGRAM(S): 10 TABLET, FILM COATED ORAL at 21:08

## 2021-11-01 RX ADMIN — ATORVASTATIN CALCIUM 20 MILLIGRAM(S): 80 TABLET, FILM COATED ORAL at 21:08

## 2021-11-01 RX ADMIN — Medication 3 MILLIGRAM(S): at 21:08

## 2021-11-01 RX ADMIN — Medication 25 MILLIGRAM(S): at 21:08

## 2021-11-01 RX ADMIN — POLYETHYLENE GLYCOL 3350 17 GRAM(S): 17 POWDER, FOR SOLUTION ORAL at 10:25

## 2021-11-01 RX ADMIN — LOSARTAN POTASSIUM 100 MILLIGRAM(S): 100 TABLET, FILM COATED ORAL at 05:10

## 2021-11-01 RX ADMIN — Medication 25 MILLIGRAM(S): at 14:05

## 2021-11-01 RX ADMIN — Medication 30 MILLIGRAM(S): at 05:10

## 2021-11-01 RX ADMIN — SENNA PLUS 2 TABLET(S): 8.6 TABLET ORAL at 21:09

## 2021-11-01 NOTE — PROGRESS NOTE ADULT - SUBJECTIVE AND OBJECTIVE BOX
EP Attending    HISTORY OF PRESENT ILLNESS: HPI:  71F with PMH of HTN, bradycardia, LBBB who presents to the [facility] with "elevated blood pressure." States that over the past week, her pressure has been persistently high. Last check, her SBP was 199. She could not recall her DBP. What prompted her to come was an acute onset of substernal chest pain, that radiated to her back. Episode lasted "not too long."   Pain was not severe as per pt. She reports compliance w/ meds. Pain went away on its own. She denied any aggravating factors. No chest pain currently. However, she attributes the death of her sister recently as an inciting event for her troubles with her BP. Pt also reports following with cardio where they were monitoring her for a pacemaker. She has since returned the device to the office. She does not know the results. Otherwise, she denies any recent travels, sick contacts, f/c, NV, abd pain, diarrhea, dysuria.     ED Course: 192/65, 40, 16, 97.3F, 99% RA. Received Hydralazine 50 PO and APAP 650. (30 Oct 2021 18:22)    10/30- Ms Smith is well known to our office, for management of hypertension which has been difficult to control.    She is listed as taking: candesartan 32 mg daily, hydralazine 50 mg tablet TID, hydrochlorothiazide 25 mg tablet at last visit on 10/16, with a question of whether she is taking Coreg or Labetalol at home, and with plans to change a standing dose of Amlodipine to Nifedipine.  A pharmacy note suggests she is prescribed Bisoprolol, Amlodipine, Hydralazine (25mg TID).  She states she gets some of her medications from [place] she travels.  She presented for "frighteningly high BP" and "severe chest pain" which she has a hard time describing any further.  Her office testing history is notable for a totally normal echocardiogram in 2021, and an angiogram with near-normal coronary arteries in late-2020 done in response to an abnormal stress test.  She has worn extended-duration Holter monitors showing minimum heartrates of 60bpm, twice in the last 18 months.  She has not previously demonstrated heartrates in the 30s-40s.  On arrival to the ED today, she is intermittently as low as 45bpm (asymptomatic) and usually in the 50-60bpm range.  Hospitalist called to inform me that she is dropping into the 30s at rest, asymptomatic.  A 10 pt ROS is otherwise negative.  Patient's history confirmed w/ patient's son Jaiden on telephone at bedside to translate.  Specifically, she is not having any orthopnea, ocular pain, headaches, blurry vision, nausea, hemoptysis, numbness or weakness of  the limbs.    10/31-  resting comfortably.  family at bedside.  no angina palpitations orthopnea or PND.    Date of Service 11/1- no new complaints, feels well, able to lie flat.    aluminum hydroxide/magnesium hydroxide/simethicone Suspension 30 milliLiter(s) Oral every 6 hours PRN  atorvastatin 20 milliGRAM(s) Oral at bedtime  donepezil 5 milliGRAM(s) Oral at bedtime  hydrALAZINE 25 milliGRAM(s) Oral three times a day  hydrochlorothiazide 25 milliGRAM(s) Oral daily  losartan 100 milliGRAM(s) Oral daily  melatonin 3 milliGRAM(s) Oral at bedtime  NIFEdipine XL 30 milliGRAM(s) Oral daily  nitroglycerin     SubLingual 0.4 milliGRAM(s) SubLingual every 5 minutes PRN  polyethylene glycol 3350 17 Gram(s) Oral two times a day  senna 2 Tablet(s) Oral at bedtime                          14.3   11.77 )-----------( 289      ( 01 Nov 2021 05:38 )             43.9       11-01    138  |  102  |  14  ----------------------------<  111<H>  4.0   |  24  |  0.63    Ca    9.8      01 Nov 2021 05:38  Phos  3.6     10-31  Mg     2.10     10-31    T(C): 36.8 (11-01-21 @ 14:00), Max: 36.8 (11-01-21 @ 14:00)  HR: 62 (11-01-21 @ 14:00) (51 - 62)  BP: 123/65 (11-01-21 @ 14:00) (123/65 - 173/59)  RR: 16 (11-01-21 @ 14:00) (16 - 18)  SpO2: 96% (11-01-21 @ 14:00) (96% - 98%)  Wt(kg): --    I&O's Summary    Appearance: Well appearing elderly woman in no acute distress.  HEENT:   Normal oral mucosa, PERRL, EOMI	  Lymphatic: No lymphadenopathy , no edema  Cardiovascular: Normal S1 S2, No JVD, No murmurs , Peripheral pulses palpable 2+ bilaterally  Respiratory: Lungs clear to auscultation, normal effort 	  Gastrointestinal:  Soft, Non-tender, + BS	  Skin: No rashes, No ecchymoses, No cyanosis, warm to touch  Musculoskeletal: Normal range of motion, normal strength  Psychiatry:  Mood & affect appropriate      TELEMETRY: NSVT and ventricular bigeminy.      ECG: Sinus radha 50s, LBBB 	  Echo: per above, normal EF and valves, no LVH, in 2021  Cath: per above, angiographically near-normal coronary arteries in 2020.  	  ASSESSMENT/PLAN: 	71y Female with uncontrolled HTN / hypertensive urgency.    White coat HTN ruled-out with ambulatory monitoring.  She has had persistent markedly elevated BP even at home.   Beta blockers stopped.  After 2-3 days of 'wash-out', heartrate is 50s-60s, no more 40s or less.  On supervised doses of an ARB (hi-dose), HCTZ, Hydralazine (low dose) and Nifedipine (low dose).  Will coordinate with office to reconcile home + hospital Rx.  I will order renal artery dopplers (pending)  Maintain telemetry.  Maintain K 4-4.5, Mg 2.  It is unclear if significant bradycardia is exacerbating her hypertension, but a pacemaker is not indicated as of yet for lack of overt symptoms during radha events- she is not lightheaded or anginal during these episodes.    Avoid giving atropine.  Simply monitor on telemetry.  Recommend supervised ambulation on telemetry to determine chronotropic competence.    Phong Starks M.D.  Cardiac Electrophysiology    office [phone]  pager [phone]

## 2021-11-01 NOTE — CONSULT NOTE ADULT - ATTENDING COMMENTS
Patient seen and examined.  Agree with above.   Check TTE and NST  Further workup pending above    Jc Razo MD

## 2021-11-02 LAB
ANION GAP SERPL CALC-SCNC: 14 MMOL/L — SIGNIFICANT CHANGE UP (ref 7–14)
BUN SERPL-MCNC: 21 MG/DL — SIGNIFICANT CHANGE UP (ref 7–23)
CALCIUM SERPL-MCNC: 10 MG/DL — SIGNIFICANT CHANGE UP (ref 8.4–10.5)
CHLORIDE SERPL-SCNC: 101 MMOL/L — SIGNIFICANT CHANGE UP (ref 98–107)
CO2 SERPL-SCNC: 23 MMOL/L — SIGNIFICANT CHANGE UP (ref 22–31)
CREAT SERPL-MCNC: 0.85 MG/DL — SIGNIFICANT CHANGE UP (ref 0.5–1.3)
GLUCOSE SERPL-MCNC: 102 MG/DL — HIGH (ref 70–99)
HCT VFR BLD CALC: 45.5 % — HIGH (ref 34.5–45)
HGB BLD-MCNC: 15.1 G/DL — SIGNIFICANT CHANGE UP (ref 11.5–15.5)
MAGNESIUM SERPL-MCNC: 2.1 MG/DL — SIGNIFICANT CHANGE UP (ref 1.6–2.6)
MCHC RBC-ENTMCNC: 27.8 PG — SIGNIFICANT CHANGE UP (ref 27–34)
MCHC RBC-ENTMCNC: 33.2 GM/DL — SIGNIFICANT CHANGE UP (ref 32–36)
MCV RBC AUTO: 83.6 FL — SIGNIFICANT CHANGE UP (ref 80–100)
NRBC # BLD: 0 /100 WBCS — SIGNIFICANT CHANGE UP
NRBC # FLD: 0 K/UL — SIGNIFICANT CHANGE UP
PHOSPHATE SERPL-MCNC: 4.2 MG/DL — SIGNIFICANT CHANGE UP (ref 2.5–4.5)
PLATELET # BLD AUTO: 320 K/UL — SIGNIFICANT CHANGE UP (ref 150–400)
POTASSIUM SERPL-MCNC: 3.9 MMOL/L — SIGNIFICANT CHANGE UP (ref 3.5–5.3)
POTASSIUM SERPL-SCNC: 3.9 MMOL/L — SIGNIFICANT CHANGE UP (ref 3.5–5.3)
RBC # BLD: 5.44 M/UL — HIGH (ref 3.8–5.2)
RBC # FLD: 14.2 % — SIGNIFICANT CHANGE UP (ref 10.3–14.5)
SODIUM SERPL-SCNC: 138 MMOL/L — SIGNIFICANT CHANGE UP (ref 135–145)
WBC # BLD: 12.09 K/UL — HIGH (ref 3.8–10.5)
WBC # FLD AUTO: 12.09 K/UL — HIGH (ref 3.8–10.5)

## 2021-11-02 PROCEDURE — 78452 HT MUSCLE IMAGE SPECT MULT: CPT | Mod: 26

## 2021-11-02 PROCEDURE — 93018 CV STRESS TEST I&R ONLY: CPT | Mod: GC

## 2021-11-02 PROCEDURE — 93016 CV STRESS TEST SUPVJ ONLY: CPT | Mod: GC

## 2021-11-02 PROCEDURE — 93306 TTE W/DOPPLER COMPLETE: CPT | Mod: 26

## 2021-11-02 RX ADMIN — DONEPEZIL HYDROCHLORIDE 5 MILLIGRAM(S): 10 TABLET, FILM COATED ORAL at 21:36

## 2021-11-02 RX ADMIN — ATORVASTATIN CALCIUM 20 MILLIGRAM(S): 80 TABLET, FILM COATED ORAL at 21:36

## 2021-11-02 RX ADMIN — Medication 25 MILLIGRAM(S): at 21:36

## 2021-11-02 RX ADMIN — Medication 30 MILLIGRAM(S): at 05:12

## 2021-11-02 RX ADMIN — Medication 25 MILLIGRAM(S): at 13:52

## 2021-11-02 RX ADMIN — Medication 3 MILLIGRAM(S): at 21:36

## 2021-11-02 RX ADMIN — POLYETHYLENE GLYCOL 3350 17 GRAM(S): 17 POWDER, FOR SOLUTION ORAL at 05:12

## 2021-11-02 RX ADMIN — Medication 25 MILLIGRAM(S): at 05:12

## 2021-11-02 RX ADMIN — LOSARTAN POTASSIUM 100 MILLIGRAM(S): 100 TABLET, FILM COATED ORAL at 05:12

## 2021-11-02 NOTE — PROGRESS NOTE ADULT - SUBJECTIVE AND OBJECTIVE BOX
Patient is a 71y old  Female who presents with a chief complaint of chest pain (02 Nov 2021 19:57)      SUBJECTIVE / OVERNIGHT EVENTS:    Events noted.  CONSTITUTIONAL: No fever,  or fatigue  RESPIRATORY: No cough, wheezing,  No shortness of breath  CARDIOVASCULAR: No chest pain, palpitations, dizziness, or leg swelling  GASTROINTESTINAL: No abdominal or epigastric pain. No nausea, vomiting.  NEUROLOGICAL: No headaches,     MEDICATIONS  (STANDING):  atorvastatin 20 milliGRAM(s) Oral at bedtime  donepezil 5 milliGRAM(s) Oral at bedtime  hydrALAZINE 25 milliGRAM(s) Oral three times a day  hydrochlorothiazide 25 milliGRAM(s) Oral daily  losartan 100 milliGRAM(s) Oral daily  melatonin 3 milliGRAM(s) Oral at bedtime  NIFEdipine XL 30 milliGRAM(s) Oral daily  polyethylene glycol 3350 17 Gram(s) Oral two times a day  senna 2 Tablet(s) Oral at bedtime    MEDICATIONS  (PRN):  aluminum hydroxide/magnesium hydroxide/simethicone Suspension 30 milliLiter(s) Oral every 6 hours PRN Dyspepsia  nitroglycerin     SubLingual 0.4 milliGRAM(s) SubLingual every 5 minutes PRN Chest Pain        CAPILLARY BLOOD GLUCOSE        I&O's Summary      T(C): 36.7 (11-02-21 @ 21:36), Max: 36.7 (11-02-21 @ 05:12)  HR: 79 (11-02-21 @ 21:36) (66 - 79)  BP: 151/74 (11-02-21 @ 21:36) (131/64 - 151/74)  RR: 16 (11-02-21 @ 21:36) (16 - 17)  SpO2: 100% (11-02-21 @ 21:36) (100% - 100%)    PHYSICAL EXAM:  GENERAL: NAD  NECK: Supple, No JVD  CHEST/LUNG: Clear to auscultation bilaterally; No wheezing.  HEART: Regular rate and rhythm; No murmurs, rubs, or gallops  ABDOMEN: Soft, Nontender, Nondistended; Bowel sounds present  EXTREMITIES:   No edema  NEUROLOGY: AAO X 3      LABS:                        15.1   12.09 )-----------( 320      ( 02 Nov 2021 06:59 )             45.5     11-02    138  |  101  |  21  ----------------------------<  102<H>  3.9   |  23  |  0.85    Ca    10.0      02 Nov 2021 06:59  Phos  4.2     11-02  Mg     2.10     11-02              CAPILLARY BLOOD GLUCOSE            RADIOLOGY & ADDITIONAL TESTS:    Imaging Personally Reviewed:    Consultant(s) Notes Reviewed:      Care Discussed with Consultants/Other Providers:    Cheng Garnica MD, CMD, FACP    [id number] [place] [address]  Office Tel: [phone]  Cell: [phone]

## 2021-11-02 NOTE — PROVIDER CONTACT NOTE (OTHER) - BACKGROUND
Patient admitted for chest pain
Patient bradycardic on admission, now HR in 40s.
patient admitted for chest pain.

## 2021-11-02 NOTE — CONSULT NOTE ADULT - SUBJECTIVE AND OBJECTIVE BOX
Date of service 11/1/2021    HISTORY OF PRESENT ILLNESS:   71F with PMH of HTN, HLD, known LBBB, historically preserved LV function and no obs CAD by cath 9/2020 admitted with elevated BP and chest pain.    States that over the past week, her pressure has been persistently high. Last check, her SBP was 199. She could not recall her DBP. What prompted her to come was an acute onset of substernal chest pain, that radiated to her back. Episode lasted "not too long."   Pain was not severe as per pt. She reports compliance w/ meds. Pain went away on its own. She denied any aggravating factors. No chest pain currently. However, she attributes the death of her sister recently as an inciting event for her troubles with her BP. Pt also reports following with cardio where they were monitoring her for a pacemaker. She has since returned the device to the office. She does not know the results. Otherwise, she denies any recent travels, sick contacts, f/c, NV, abd pain, diarrhea, dysuria.       PAST MEDICAL & SURGICAL HISTORY:  HTN (Hypertension)    Vertigo    CAD (coronary artery disease)    Mixed hyperlipidemia    LBBB (left bundle branch block)    Sinus bradycardia    H/O cardiac catheterization    MEDICATIONS  (STANDING):  atorvastatin 20 milliGRAM(s) Oral at bedtime  donepezil 5 milliGRAM(s) Oral at bedtime  hydrALAZINE 25 milliGRAM(s) Oral three times a day  hydrochlorothiazide 25 milliGRAM(s) Oral daily  losartan 100 milliGRAM(s) Oral daily  melatonin 3 milliGRAM(s) Oral at bedtime  NIFEdipine XL 30 milliGRAM(s) Oral daily  polyethylene glycol 3350 17 Gram(s) Oral two times a day  senna 2 Tablet(s) Oral at bedtime      Allergies  No Known Allergies      FAMILY HISTORY:  Family history of hypertension in father    Family history of diabetes mellitus in father    Family history of ischemic heart disease    Family history of breast cancer (Sibling)    Family history of malignant neoplasm of uterus (Sibling)    Non-contributary for premature coronary disease or sudden cardiac death    SOCIAL HISTORY:    [x ] Non-smoker  [ ] Smoker  [ ] Alcohol    FLU VACCINE THIS YEAR STARTS IN AUGUST:  [ ] Yes    [ ] No    IF OVER 65 HAVE YOU EVER HAD A PNA VACCINE:  [ ] Yes    [ ] No       [ ] N/A      REVIEW OF SYSTEMS:  [x ]chest pain  [  ]shortness of breath  [  ]palpitations  [  ]syncope  [ ]near syncope [ ]upper extremity weakness   [ ] lower extremity weakness  [  ]diplopia  [  ]altered mental status   [  ]fevers  [ ]chills [ ]nausea  [ ]vomiting  [  ]dysphagia    [ ]abdominal pain  [ ]melena  [ ]BRBPR    [  ]epistaxis  [  ]rash    [ ]lower extremity edema        [x ] All others negative	  [ ] Unable to obtain      LABS:	 	    trop T 20, 17                        14.3   11.77 )-----------( 289      ( 01 Nov 2021 05:38 )             43.9     Hb Trend: 14.3<--    11-01    138  |  102  |  14  ----------------------------<  111<H>  4.0   |  24  |  0.63    Ca    9.8      01 Nov 2021 05:38  Phos  3.6     10-31  Mg     2.10     10-31      Creatinine Trend: 0.63<--, 0.69<--, 0.61<--, 0.68<--    PHYSICAL EXAM:  T(C): 36.7 (11-01-21 @ 05:15), Max: 36.7 (11-01-21 @ 05:15)  HR: 51 (11-01-21 @ 05:15) (51 - 52)  BP: 159/76 (11-01-21 @ 05:15) (159/76 - 173/59)  RR: 18 (11-01-21 @ 05:15) (16 - 18)  SpO2: 96% (11-01-21 @ 05:15) (96% - 98%)    Gen: Appears well in NAD  HEENT:  (-)icterus (-)pallor  CV: N S1 S2 1/6 DORIS (+)2 Pulses B/l  Resp:  Clear to ausculatation B/L, normal effort  GI: (+) BS Soft, NT, ND  Lymph:  (-)Edema, (-)obvious lymphadenopathy  Skin: Warm to touch, Normal turgor  Psych: Appropriate mood and affect    TELEMETRY: 	  SR 4 NSVT    ECG: < from: 12 Lead ECG (10.30.21 @ 11:57) >  Sinus bradycardia  Left axis deviation  Left bundle branch block  < end of copied text >    	  < from: Transthoracic Echocardiogram (09.03.20 @ 12:41) >  CONCLUSIONS:  1. Mitral annular calcification, otherwise normal mitral  valve. Minimal mitral regurgitation.  2. Moderately dilated left atrium.  LA volume index = 44  cc/m2.  3. Mild concentric left ventricular hypertrophy.  4. Normal left ventricular systolic function. No segmental  wall motion abnormalities.  5. Mild diastolic dysfunction (Stage I).  6. Normal right ventricular size and function.  ------------------------------------------------------------------------  Confirmed on  9/3/2020 - 16:57:08 by Farhan Kelly M.D.    < end of copied text >      ASSESSMENT/PLAN: 	71F with PMH of HTN, HLD, known LBBB, historically preserved LV function and no obs CAD by cath 9/2020 admitted with elevated BP and chest pain.    --BP much improved on meds listed  --Trop T indeterminate  --check TTE and NST  --F/U Dr Neff after DC, [phone]        
Patient is a 71y old  Female who presents with a chief complaint of chest pain (02 Nov 2021 15:45)      HPI:    71F with PMH of HTN, bradycardia, LBBB who presents to the hospital with "elevated blood pressure." States that over the past week, her pressure has been persistently high. Last check, her SBP was 199. She could not recall her DBP. What prompted her to come was an acute onset of substernal chest pain, that radiated to her back. Episode lasted "not too long."   Pain was not severe as per pt. She reports compliance w/ meds. Pain went away on its own. She denied any aggravating factors. No chest pain currently. However, she attributes the death of her sister recently as an inciting event for her troubles with her BP. Pt also reports following with cardio where they were monitoring her for a pacemaker. She has since returned the device to the office. She does not know the results. Otherwise, she denies any recent travels, sick contacts, f/c, NV, abd pain, diarrhea, dysuria.     ED Course: 192/65, 40, 16, 97.3F, 99% RA. Received Hydralazine 50 PO and APAP 650. (30 Oct 2021 18:22)    HOSP COURSE:    Pt a/w evaluation of CP, symptomatic bradycardia and elevated BPs.  Renal artery dopplers showed no evidence of stenosis.  Echo with mod global L vent sys dysf.      WBC 9.8 --> 12.2.  ESR 18.  CRP 5.9.   Eos 12 - 16%.  Lyme titers neg. Covid pcr (-).  Cxr no pulmonary vascular congestion or pleural effusions.  Rounded opacity within the right lower lung appears overall stable compared to prior study.       ID consulted to evaluate elevated WBC.         REVIEW OF SYSTEMS:    CONSTITUTIONAL: No fever, weight loss, or fatigue  EYES: No eye pain, visual disturbances, or discharge  ENMT:  No sore throat  NECK: No pain or stiffness  RESPIRATORY: No cough, wheezing, chills or hemoptysis; No shortness of breath  CARDIOVASCULAR: No chest pain, palpitations, dizziness, or leg swelling  GASTROINTESTINAL: No abdominal or epigastric pain. No nausea, vomiting, or hematemesis; No diarrhea or constipation. No melena or hematochezia.  GENITOURINARY: No dysuria, frequency, hematuria, or incontinence  NEUROLOGICAL: No headaches, memory loss, loss of strength, numbness, or tremors  SKIN: No itching, burning, rashes, or lesions   LYMPH NODES: No enlarged glands  MUSCULOSKELETAL: No joint pain or swelling; No muscle, back, or extremity pain      PAST MEDICAL & SURGICAL HISTORY:  HTN (Hypertension)    Vertigo    CAD (coronary artery disease)    Mixed hyperlipidemia    LBBB (left bundle branch block)    Sinus bradycardia    H/O cardiac catheterization  Normal. 2007.        Allergies    No Known Allergies    Intolerances        FAMILY HISTORY:  Family history of hypertension in father    Family history of diabetes mellitus in father    Family history of ischemic heart disease    Family history of breast cancer (Sibling)    Family history of malignant neoplasm of uterus (Sibling)        SOCIAL HISTORY:    Denies any tobacco, drug, ETOH use.  Independent w/ ADLs. Does not require assistive device.  , lives w/ spouse and son.    MEDICATIONS  (STANDING):  atorvastatin 20 milliGRAM(s) Oral at bedtime  donepezil 5 milliGRAM(s) Oral at bedtime  hydrALAZINE 25 milliGRAM(s) Oral three times a day  hydrochlorothiazide 25 milliGRAM(s) Oral daily  losartan 100 milliGRAM(s) Oral daily  melatonin 3 milliGRAM(s) Oral at bedtime  NIFEdipine XL 30 milliGRAM(s) Oral daily  polyethylene glycol 3350 17 Gram(s) Oral two times a day  senna 2 Tablet(s) Oral at bedtime    MEDICATIONS  (PRN):  aluminum hydroxide/magnesium hydroxide/simethicone Suspension 30 milliLiter(s) Oral every 6 hours PRN Dyspepsia  nitroglycerin     SubLingual 0.4 milliGRAM(s) SubLingual every 5 minutes PRN Chest Pain      Vital Signs Last 24 Hrs  T(C): 36.3 (02 Nov 2021 10:00), Max: 36.7 (01 Nov 2021 21:08)  T(F): 97.3 (02 Nov 2021 10:00), Max: 98 (01 Nov 2021 21:08)  HR: 69 (02 Nov 2021 13:50) (66 - 99)  BP: 131/64 (02 Nov 2021 13:50) (131/64 - 152/79)  BP(mean): --  RR: 17 (02 Nov 2021 10:00) (16 - 17)  SpO2: 100% (02 Nov 2021 10:00) (98% - 100%)    PHYSICAL EXAM:    GENERAL: NAD, well-groomed  HEAD:  Atraumatic, Normocephalic  EYES: EOMI, PERRLA, conjunctiva and sclera clear  ENMT: No tonsillar erythema, exudates, or enlargement; Moist mucous membranes  NECK: Supple, No JVD  CHEST/LUNG: Clear to percussion bilaterally; No rales, rhonchi, wheezing, or rubs  HEART: Regular rate and rhythm; No murmurs, rubs, or gallops  ABDOMEN: Soft, Nontender, Nondistended; Bowel sounds present  EXTREMITIES:  2+ Peripheral Pulses, No clubbing, cyanosis, or edema  LYMPH: No lymphadenopathy noted  SKIN: No rashes or lesions    LABS:  CBC Full  -  ( 02 Nov 2021 06:59 )  WBC Count : 12.09 K/uL  RBC Count : 5.44 M/uL  Hemoglobin : 15.1 g/dL  Hematocrit : 45.5 %  Platelet Count - Automated : 320 K/uL  Mean Cell Volume : 83.6 fL  Mean Cell Hemoglobin : 27.8 pg  Mean Cell Hemoglobin Concentration : 33.2 gm/dL  Auto Neutrophil # : x  Auto Lymphocyte # : x  Auto Monocyte # : x  Auto Eosinophil # : x  Auto Basophil # : x  Auto Neutrophil % : x  Auto Lymphocyte % : x  Auto Monocyte % : x  Auto Eosinophil % : x  Auto Basophil % : x      11-02    138  |  101  |  21  ----------------------------<  102<H>  3.9   |  23  |  0.85    Ca    10.0      02 Nov 2021 06:59  Phos  4.2     11-02  Mg     2.10     11-02          MICROBIOLOGY:      COVID-19 PCR . (10.30.21 @ 17:36)   COVID-19 PCR: NotDetec: You can help in the fight against COVID-19. [facility] may contact   you to see if you are interested in voluntarily participating in one of   our clinical trials.               RADIOLOGY:    < from: Xray Chest 2 Views PA/Lat (10.30.21 @ 15:04) >  IMPRESSION:    No pulmonary vascular congestion or pleural effusions.    Rounded opacity within the right lower lung appears overall stable compared to prior study. Recommend correlation with recent CT chest and continued follow-up.    < end of copied text >              
EP Attending    HISTORY OF PRESENT ILLNESS: HPI:  71F with PMH of HTN, bradycardia, LBBB who presents to the [facility] with "elevated blood pressure." States that over the past week, her pressure has been persistently high. Last check, her SBP was 199. She could not recall her DBP. What prompted her to come was an acute onset of substernal chest pain, that radiated to her back. Episode lasted "not too long."   Pain was not severe as per pt. She reports compliance w/ meds. Pain went away on its own. She denied any aggravating factors. No chest pain currently. However, she attributes the death of her sister recently as an inciting event for her troubles with her BP. Pt also reports following with cardio where they were monitoring her for a pacemaker. She has since returned the device to the office. She does not know the results. Otherwise, she denies any recent travels, sick contacts, f/c, NV, abd pain, diarrhea, dysuria.     ED Course: 192/65, 40, 16, 97.3F, 99% RA. Received Hydralazine 50 PO and APAP 650. (30 Oct 2021 18:22)    10/30- Ms Smith is well known to our office, for management of hypertension which has been difficult to control.    She is listed as taking: candesartan 32 mg daily, hydralazine 50 mg tablet TID, hydrochlorothiazide 25 mg tablet at last visit on 10/16, with a question of whether she is taking Coreg or Labetalol at home, and with plans to change a standing dose of Amlodipine to Nifedipine.  A pharmacy note suggests she is prescribed Bisoprolol, Amlodipine, Hydralazine (25mg TID).  She states she gets some of [place] medications from [place].  She presented for "frighteningly high BP" and "severe chest pain" which she has a hard time describing any further.  Her office testing history is notable for a totally normal echocardiogram in 2021, and an angiogram with near-normal coronary arteries in late-2020 done in response to an abnormal stress test.  She has worn extended-duration Holter monitors showing minimum heartrates of 60bpm, twice in the last 18 months.  She has not previously demonstrated heartrates in the 30s-40s.  On arrival to the ED today, she is intermittently as low as 45bpm (asymptomatic) and usually in the 50-60bpm range.  Hospitalist called to inform me that she is dropping into the 30s at rest, asymptomatic.  A 10 pt ROS is otherwise negative.  Patient's history confirmed w/ patient's son Jaiden on telephone at bedside to translate.  Specifically, she is not having any orthopnea, ocular pain, headaches, blurry vision, nausea, hemoptysis, numbness or weakness of  the limbs.    Date of Service 10/31-  resting comfortably.  family at bedside.  no angina palpitations orthopnea or PND.      atorvastatin 20 milliGRAM(s) Oral at bedtime  donepezil 5 milliGRAM(s) Oral at bedtime  hydrALAZINE 25 milliGRAM(s) Oral three times a day  hydrochlorothiazide 25 milliGRAM(s) Oral daily  losartan 100 milliGRAM(s) Oral daily  melatonin 3 milliGRAM(s) Oral at bedtime  NIFEdipine XL 30 milliGRAM(s) Oral daily  nitroglycerin     SubLingual 0.4 milliGRAM(s) SubLingual every 5 minutes PRN                          12.1   9.82  )-----------( 258      ( 30 Oct 2021 13:43 )             36.2       10-31    141  |  106  |  16  ----------------------------<  96  3.8   |  27  |  0.69    Ca    9.3      31 Oct 2021 06:42  Phos  3.6     10-31  Mg     2.10     10-31    TPro  7.1  /  Alb  3.9  /  TBili  0.6  /  DBili  x   /  AST  31  /  ALT  24  /  AlkPhos  112  10-30      T(C): 36.1 (10-31-21 @ 13:08), Max: 36.8 (10-30-21 @ 17:22)  HR: 45 (10-31-21 @ 13:08) (36 - 58)  BP: 147/60 (10-31-21 @ 13:08) (147/60 - 181/69)  RR: 16 (10-31-21 @ 13:08) (13 - 18)  SpO2: 99% (10-31-21 @ 13:08) (98% - 100%)  Wt(kg): --    I&O's Summary    Appearance: Well appearing elderly woman in no acute distress.  HEENT:   Normal oral mucosa, PERRL, EOMI	  Lymphatic: No lymphadenopathy , no edema  Cardiovascular: Normal S1 S2, No JVD, No murmurs , Peripheral pulses palpable 2+ bilaterally  Respiratory: Lungs clear to auscultation, normal effort 	  Gastrointestinal:  Soft, Non-tender, + BS	  Skin: No rashes, No ecchymoses, No cyanosis, warm to touch  Musculoskeletal: Normal range of motion, normal strength  Psychiatry:  Mood & affect appropriate      TELEMETRY: NSR with frequent excursions to sinus bradycardia 40-48bpm.  Episodes of HR 36 is a post-PVC compensatory pause, not a sustained rhythm.  Asymptomatic at rest. 	    ECG: Sinus radha 50s, LBBB 	  Echo: per above, normal EF and valves, no LVH, in 2021  Cath: per above, angiographically near-normal coronary arteries in 2020.  	    ASSESSMENT/PLAN: 	71y Female with uncontrolled HTN / hypertensive urgency.    White coat HTN ruled-out with ambulatory monitoring.  She has had persistent markedly elevated BP even at home.   At this time, recommend STOPPING any Beta blockers.  On supervised doses of an ARB (hi-dose), HCTZ, Hydralazine (low dose) and Nifedipine (low dose).  Will coordinate with office to reconcile home + hospital Rx.  I will order renal artery dopplers.  Maintain telemetry.  Maintain K 4-4.5, Mg 2.  It is unclear if significant bradycardia is exacerbating her hypertension, but a pacemaker is not indicated as of yet for lack of overt symptoms during radha events- she is not lightheaded or anginal during these episodes.    Avoid giving atropine.  Simply monitor on telemetry.  Recommend supervised ambulation on telemetry to determine chronotropic competence.        Phong Starks M.D.  Cardiac Electrophysiology    office [phone]  pager [phone]
EP Attending    HISTORY OF PRESENT ILLNESS: HPI:  71F with PMH of HTN, bradycardia, LBBB who presents to the [facility] with "elevated blood pressure." States that over the past week, her pressure has been persistently high. Last check, her SBP was 199. She could not recall her DBP. What prompted her to come was an acute onset of substernal chest pain, that radiated to her back. Episode lasted "not too long."   Pain was not severe as per pt. She reports compliance w/ meds. Pain went away on its own. She denied any aggravating factors. No chest pain currently. However, she attributes the death of her sister recently as an inciting event for her troubles with her BP. Pt also reports following with cardio where they were monitoring her for a pacemaker. She has since returned the device to the office. She does not know the results. Otherwise, she denies any recent travels, sick contacts, f/c, NV, abd pain, diarrhea, dysuria.     ED Course: 192/65, 40, 16, 97.3F, 99% RA. Received Hydralazine 50 PO and APAP 650. (30 Oct 2021 18:22)    Date of Service 10/30- Ms Smith is well known to our office, for management of hypertension which has been difficult to control.    She is listed as taking: candesartan 32 mg daily, hydralazine 50 mg tablet TID, hydrochlorothiazide 25 mg tablet at last visit on 10/16, with a question of whether she is taking Coreg or Labetalol at home, and with plans to change a standing dose of Amlodipine to Nifedipine.  A pharmacy note suggests she is prescribed Bisoprolol, Amlodipine, Hydralazine (25mg TID).  She states she gets some of her medications from [place] she travels.  She presented for "frighteningly high BP" and "severe chest pain" which she has a hard time describing any further.  Her office testing history is notable for a totally normal echocardiogram in 2021, and an angiogram with near-normal coronary arteries in late-2020 done in response to an abnormal stress test.  She has worn extended-duration Holter monitors showing minimum heartrates of 60bpm, twice in the last 18 months.  She has not previously demonstrated heartrates in the 30s-40s.  On arrival to the ED today, she is intermittently as low as 45bpm (asymptomatic) and usually in the 50-60bpm range.  Hospitalist called to inform me that she is dropping into the 30s at rest, asymptomatic.  A 10 pt ROS is otherwise negative.  Patient's history confirmed w/ patient's son Jaiden on telephone at bedside to translate.  Specifically, she is not having any orthopnea, ocular pain, headaches, blurry vision, nausea, hemoptysis, numbness or weakness of  the limbs.      PAST MEDICAL & SURGICAL HISTORY:  HTN (Hypertension)    Vertigo    CAD (coronary artery disease)    Mixed hyperlipidemia    LBBB (left bundle branch block)    Sinus bradycardia    H/O cardiac catheterization  Normal. 2007.    MEDICATIONS  (STANDING):  atorvastatin 20 milliGRAM(s) Oral at bedtime  donepezil 5 milliGRAM(s) Oral at bedtime  hydrALAZINE 25 milliGRAM(s) Oral three times a day  melatonin 3 milliGRAM(s) Oral at bedtime      Allergies    No Known Allergies    Intolerances    FAMILY HISTORY:  Family history of hypertension in father    Family history of diabetes mellitus in father    Family history of ischemic heart disease    Family history of breast cancer (Sibling)    Family history of malignant neoplasm of uterus (Sibling)      Non-contributary for premature coronary disease or sudden cardiac death    SOCIAL HISTORY:    [ ] Non-smoker  [ ] Smoker  [ ] Alcohol    PHYSICAL EXAM:  T(C): 36.8 (10-30-21 @ 17:22), Max: 36.8 (10-30-21 @ 17:22)  HR: 58 (10-30-21 @ 18:39) (36 - 58)  BP: 166/75 (10-30-21 @ 18:25) (154/51 - 195/71)  RR: 15 (10-30-21 @ 18:25) (13 - 18)  SpO2: 100% (10-30-21 @ 18:25) (99% - 100%)  Wt(kg): --    Appearance: Well appearing elderly woman in no acute distress.  HEENT:   Normal oral mucosa, PERRL, EOMI	  Lymphatic: No lymphadenopathy , no edema  Cardiovascular: Normal S1 S2, No JVD, No murmurs , Peripheral pulses palpable 2+ bilaterally  Respiratory: Lungs clear to auscultation, normal effort 	  Gastrointestinal:  Soft, Non-tender, + BS	  Skin: No rashes, No ecchymoses, No cyanosis, warm to touch  Musculoskeletal: Normal range of motion, normal strength  Psychiatry:  Mood & affect appropriate      TELEMETRY: NSR with frequent excursions to sinus bradycardia 40-48bpm. 	    ECG: Sinus radha 50s, LBBB 	  Echo: per above, normal EF and valves, no LVH, in 2021  Cath: per above, angiographically near-normal coronary arteries in 2020.  	  	  LABS:	 	                          12.1   9.82  )-----------( 258      ( 30 Oct 2021 13:43 )             36.2     10-30    141  |  105  |  14  ----------------------------<  120<H>  4.2   |  25  |  0.68    Ca    9.0      30 Oct 2021 13:43    TPro  7.1  /  Alb  3.9  /  TBili  0.6  /  DBili  x   /  AST  31  /  ALT  24  /  AlkPhos  112  10-30    TSH: Thyroid Stimulating Hormone, Serum: 6.25 uIU/mL (10-30 @ 13:43)      ASSESSMENT/PLAN: 	71y Female with uncontrolled HTN / hypertensive urgency.    White coat HTN ruled-out with ambulatory monitoring.  She has had persistent markedly elevated BP even at home.   At this time, recommend STOPPING any Beta blockers.  She has normal kidney function.  Her BP control is refractory to at least an ARB and vasodilator (hydralazine).  Continue Candesartan 32mg daily or equivalent dose of an ARB.  Continue Hydralazine at the 50mg TID dose (that is what is written on the bottle she brought into the ED)  I will Start HCTZ 25mg daily, and start Nifedipine 30mg ext release daily.  I will order renal artery dopplers.  Maintain telemetry.  Maintain K 4-4.5, Mg 2.  It is unclear if significant bradycardia is exacerbating her hypertension, but a pacemaker is not indicated as of yet for lack of overt symptoms during radha events- she is not lightheaded or anginal during these episodes.    Avoid giving atropine.  Simply monitor on telemetry.        Phong Starks M.D.  Cardiac Electrophysiology    office [phone]  pager [phone]
Patient is a 71y old  Female who presents with a chief complaint of chest pain (01 Nov 2021 15:58)      HPI:  71F with PMH of HTN, bradycardia, LBBB who presents to the comes the hospital with "elevated blood pressure." States that over the past week, her pressure has been persistently high. Last check, her SBP was 199. She could not recall her DBP. What prompted her to come was an acute onset of substernal chest pain, that radiated to her back. Episode lasted "not too long."   Pain was not severe as per pt. She reports compliance w/ meds. Pain went away on its own. She denied any aggravating factors. No chest pain currently. However, she attributes the death of her sister recently as an inciting event for her troubles with her BP. Pt also reports following with cardio where they were monitoring her for a pacemaker. She has since returned the device to the office. She does not know the results. Otherwise, she denies any recent travels, sick contacts, f/c, NV, abd pain, diarrhea, dysuria.     ED Course: 192/65, 40, 16, 97.3F, 99% RA. Received Hydralazine 50 PO and APAP 650. (30 Oct 2021 18:22)      PAST MEDICAL & SURGICAL HISTORY:  HTN (Hypertension)    Vertigo    CAD (coronary artery disease)    Mixed hyperlipidemia    LBBB (left bundle branch block)    Sinus bradycardia    H/O cardiac catheterization  Normal. 2007.        Review of Systems:   CONSTITUTIONAL: No fever,  or fatigue  NECK: No pain or stiffness  RESPIRATORY: No cough,  No shortness of breath  CARDIOVASCULAR: No chest pain, palpitations, dizziness, or leg swelling  GASTROINTESTINAL: No abdominal  pain. No nausea, vomiting.  NEUROLOGICAL: No headaches,           Allergies    No Known Allergies    Intolerances        Social History:     FAMILY HISTORY:  Family history of hypertension in father    Family history of diabetes mellitus in father    Family history of ischemic heart disease    Family history of breast cancer (Sibling)    Family history of malignant neoplasm of uterus (Sibling)        MEDICATIONS  (STANDING):  atorvastatin 20 milliGRAM(s) Oral at bedtime  donepezil 5 milliGRAM(s) Oral at bedtime  hydrALAZINE 25 milliGRAM(s) Oral three times a day  hydrochlorothiazide 25 milliGRAM(s) Oral daily  losartan 100 milliGRAM(s) Oral daily  melatonin 3 milliGRAM(s) Oral at bedtime  NIFEdipine XL 30 milliGRAM(s) Oral daily  polyethylene glycol 3350 17 Gram(s) Oral two times a day  senna 2 Tablet(s) Oral at bedtime    MEDICATIONS  (PRN):  aluminum hydroxide/magnesium hydroxide/simethicone Suspension 30 milliLiter(s) Oral every 6 hours PRN Dyspepsia  nitroglycerin     SubLingual 0.4 milliGRAM(s) SubLingual every 5 minutes PRN Chest Pain      CAPILLARY BLOOD GLUCOSE        I&O's Summary      T(C): 36.8 (11-01-21 @ 14:00), Max: 36.8 (11-01-21 @ 14:00)  HR: 62 (11-01-21 @ 14:00) (51 - 62)  BP: 123/65 (11-01-21 @ 14:00) (123/65 - 159/76)  RR: 16 (11-01-21 @ 14:00) (16 - 18)  SpO2: 96% (11-01-21 @ 14:00) (96% - 96%)    PHYSICAL EXAM:    GENERAL: NAD  NECK: Supple, No JVD  CHEST/LUNG: Clear to auscultation bilaterally; No wheezing.  HEART: Regular rate and rhythm; No murmurs, rubs, or gallops  ABDOMEN: Soft, Nontender, Nondistended; Bowel sounds present  EXTREMITIES:  2+ Peripheral Pulses, No edema  NEUROLOGY: AAOx 3      LABS:                        14.3   11.77 )-----------( 289      ( 01 Nov 2021 05:38 )             43.9     11-01    138  |  102  |  14  ----------------------------<  111<H>  4.0   |  24  |  0.63    Ca    9.8      01 Nov 2021 05:38  Phos  3.6     10-31  Mg     2.10     10-31              CAPILLARY BLOOD GLUCOSE            RADIOLOGY & ADDITIONAL TESTS:    Imaging Personally Reviewed:    Consultant(s) Notes Reviewed:      Care Discussed with Consultants/Other Providers:    Thanks for consult. Will follow.

## 2021-11-02 NOTE — CONSULT NOTE ADULT - ASSESSMENT
· Assessment	  71F with PMH of HTN, bradycardia, LBBB who presents to the [facility] the hospital with elevated BP. Initial labs unremarkable. Troponin delta negative. Lower suspicion for ACS. Anginal sx could be related to stress given recent death of sister. However, pt also noted to bradycardic (which appears unchanged from prior). Admit for further cardiac workup.     Chest pain:    Cardio eval appreciated.  Tele  ECHO  NST    HTN:  Cw Losartan/Hydralazine/Nifedipine  BP stable
71F with PMH of HTN, bradycardia, LBBB who presents to the hospital with "elevated blood pressure." States that over the past week, her pressure has been persistently high. Last check, her SBP was 199. She could not recall her DBP. What prompted her to come was an acute onset of substernal chest pain, that radiated to her back. Episode lasted "not too long."   Pain was not severe as per pt. She reports compliance w/ meds. Pain went away on its own. She denied any aggravating factors. No chest pain currently. However, she attributes the death of her sister recently as an inciting event for her troubles with her BP. Pt also reports following with cardio where they were monitoring her for a pacemaker. She has since returned the device to the office. She does not know the results. Otherwise, she denies any recent travels, sick contacts, f/c, NV, abd pain, diarrhea, dysuria.     ED Course: 192/65, 40, 16, 97.3F, 99% RA. Received Hydralazine 50 PO and APAP 650. (30 Oct 2021 18:22)    HOSP COURSE:    Pt a/w evaluation of CP, symptomatic bradycardia and elevated BPs.  Renal artery dopplers showed no evidence of stenosis.  Echo with mod global L vent sys dysf.      WBC 9.8 --> 12.2.  ESR 18.  CRP 5.9.   Eos 12 - 16%.  Lyme titers neg. Covid pcr (-).  Cxr no pulmonary vascular congestion or pleural effusions.  Rounded opacity within the right lower lung appears overall stable compared to prior study.       ID consulted to evaluate elevated WBC.       Leukocytosis/eosinophilia:    - Wbc elevated to 12, no new localizing symptoms on clinical exam.  Pt denies HA, n/v, cough, cp, abd pain, diarrhea/dysuria, rash.      - Cont to monitor CBC with diff with next blood draw, if cont to rise, send UA/Ucx.  Will reassess need for further laboratory and diagnostic testing.      - Pt noted to have elevated peripheral eosinophils, send strongyloides ab, trichinella, and toxocara.  Check stool o&p.    d/w pt's daughter, Chasidy, over the phone.    Will follow,    Domitila Reese  [phone]

## 2021-11-02 NOTE — PROVIDER CONTACT NOTE (OTHER) - ACTION/TREATMENT ORDERED:
Provider made aware. Waiting for further instruction
provider made aware,will continue to monitor.
Provider made aware. Patient labs will be drawn early as per provider order. No further action ordered at this time
Provider notified, continue to monitor
Continue to monitor on tele, notify provider if HR <30

## 2021-11-02 NOTE — PROVIDER CONTACT NOTE (OTHER) - ASSESSMENT
No signs of distress noted at this time. Patients denies chest pain/ SOB
Patient complaining of slight discomfort in chest.
Patient HR in 100's with activity, VSS.
Asymptomatic, other VSS
Patient asymptomatic in bed at this time.

## 2021-11-02 NOTE — PROGRESS NOTE ADULT - SUBJECTIVE AND OBJECTIVE BOX
EP Attending    HISTORY OF PRESENT ILLNESS: HPI:  71F with PMH of HTN, bradycardia, LBBB who presents to the [facility] with "elevated blood pressure." States that over the past week, her pressure has been persistently high. Last check, her SBP was 199. She could not recall her DBP. What prompted her to come was an acute onset of substernal chest pain, that radiated to her back. Episode lasted "not too long."   Pain was not severe as per pt. She reports compliance w/ meds. Pain went away on its own. She denied any aggravating factors. No chest pain currently. However, she attributes the death of her sister recently as an inciting event for her troubles with her BP. Pt also reports following with cardio where they were monitoring her for a pacemaker. She has since returned the device to the office. She does not know the results. Otherwise, she denies any recent travels, sick contacts, f/c, NV, abd pain, diarrhea, dysuria.     ED Course: 192/65, 40, 16, 97.3F, 99% RA. Received Hydralazine 50 PO and APAP 650. (30 Oct 2021 18:22)    10/30- Ms Smith is well known to our office, for management of hypertension which has been difficult to control.    She is listed as taking: candesartan 32 mg daily, hydralazine 50 mg tablet TID, hydrochlorothiazide 25 mg tablet at last visit on 10/16, with a question of whether she is taking Coreg or Labetalol at home, and with plans to change a standing dose of Amlodipine to Nifedipine.  A pharmacy note suggests she is prescribed Bisoprolol, Amlodipine, Hydralazine (25mg TID).  She states she gets some of her medications from [place] she [place].  She presented for "frighteningly high BP" and "severe chest pain" which she has a hard time describing any further.  Her office testing history is notable for a totally normal echocardiogram in 2021, and an angiogram with near-normal coronary arteries in late-2020 done in response to an abnormal stress test.  She has worn extended-duration Holter monitors showing minimum heartrates of 60bpm, twice in the last 18 months.  She has not previously demonstrated heartrates in the 30s-40s.  On arrival to the ED today, she is intermittently as low as 45bpm (asymptomatic) and usually in the 50-60bpm range.  Hospitalist called to inform me that she is dropping into the 30s at rest, asymptomatic.  A 10 pt ROS is otherwise negative.  Patient's history confirmed w/ patient's son Jaiden on telephone at bedside to translate.  Specifically, she is not having any orthopnea, ocular pain, headaches, blurry vision, nausea, hemoptysis, numbness or weakness of  the limbs.    10/31-  resting comfortably.  family at bedside.  no angina palpitations orthopnea or PND.    11/1- no new complaints, feels well, able to lie flat.  Date of Service 11/2- telemetry shows chronotropic competence w/ exercise, HR hits 100.  awake and ambulating without difficulty.      aluminum hydroxide/magnesium hydroxide/simethicone Suspension 30 milliLiter(s) Oral every 6 hours PRN  atorvastatin 20 milliGRAM(s) Oral at bedtime  donepezil 5 milliGRAM(s) Oral at bedtime  hydrALAZINE 25 milliGRAM(s) Oral three times a day  hydrochlorothiazide 25 milliGRAM(s) Oral daily  losartan 100 milliGRAM(s) Oral daily  melatonin 3 milliGRAM(s) Oral at bedtime  NIFEdipine XL 30 milliGRAM(s) Oral daily  nitroglycerin     SubLingual 0.4 milliGRAM(s) SubLingual every 5 minutes PRN  polyethylene glycol 3350 17 Gram(s) Oral two times a day  senna 2 Tablet(s) Oral at bedtime                            15.1   12.09 )-----------( 320      ( 02 Nov 2021 06:59 )             45.5       11-02    138  |  101  |  21  ----------------------------<  102<H>  3.9   |  23  |  0.85    Ca    10.0      02 Nov 2021 06:59  Phos  4.2     11-02  Mg     2.10     11-02    T(C): 36.3 (11-02-21 @ 10:00), Max: 36.7 (11-01-21 @ 21:08)  HR: 69 (11-02-21 @ 13:50) (66 - 99)  BP: 131/64 (11-02-21 @ 13:50) (131/64 - 152/79)  RR: 17 (11-02-21 @ 10:00) (16 - 17)  SpO2: 100% (11-02-21 @ 10:00) (98% - 100%)  Wt(kg): --    I&O's Summary    Appearance: Well appearing elderly woman in no acute distress.  HEENT:   Normal oral mucosa, PERRL, EOMI	  Lymphatic: No lymphadenopathy , no edema  Cardiovascular: Normal S1 S2, No JVD, No murmurs , Peripheral pulses palpable 2+ bilaterally  Respiratory: Lungs clear to auscultation, normal effort 	  Gastrointestinal:  Soft, Non-tender, + BS	  Skin: No rashes, No ecchymoses, No cyanosis, warm to touch  Musculoskeletal: Normal range of motion, normal strength  Psychiatry:  Mood & affect appropriate    TELEMETRY: NSVT and ventricular bigeminy.      ECG: Sinus radha 50s, LBBB 	  Echo: per above, normal EF and valves, no LVH, in 2021  Cath: per above, angiographically near-normal coronary arteries in 2020.  Stress: Abnormal with reduced LV EF post-stress.    ASSESSMENT/PLAN: 	71y Female with uncontrolled HTN / hypertensive urgency.    White coat HTN ruled-out with ambulatory monitoring.  She has had persistent markedly elevated BP even at home.   Beta blockers stopped.  After 2-3 days of 'wash-out', heartrate is 50s-60s at rest and can hit 100 w/ exercise.  No indication for permanent pacing.  On supervised doses of an ARB (hi-dose), HCTZ, Hydralazine (low dose) and Nifedipine (low dose).    Blood pressure is stabilized on supervised dosing, which suggests a compliance issue at home.  I will order renal artery dopplers (pending)  Maintain telemetry.  Maintain K 4-4.5, Mg 2.      Phong Starks M.D.  Cardiac Electrophysiology    office [phone]  pager [phone]

## 2021-11-02 NOTE — PROGRESS NOTE ADULT - SUBJECTIVE AND OBJECTIVE BOX
Date of service: 11/02/21    chief complaint: chest pain     extended hpi: 71F with PMH of HTN, HLD, known LBBB, historically preserved LV function and no obs CAD by cath 9/2020 admitted with elevated BP and chest pain.    S: no chest pain or sob; ros otherwise negative.     Review of Systems:   Constitutional: [ ] fevers, [ ] chills.   Skin: [ ] dry skin. [ ] rashes.  Psychiatric: [ ] depression, [ ] anxiety.   Gastrointestinal: [ ] BRBPR, [ ] melena.   Neurological: [ ] confusion. [ ] seizures. [ ] shuffling gait.   Ears,Nose,Mouth and Throat: [ ] ear pain [ ] sore throat.   Eyes: [ ] diplopia.   Respiratory: [ ] hemoptysis. [ ] shortness of breath  Cardiovascular: See HPI above  Hematologic/Lymphatic: [ ] anemia. [ ] painful nodes. [ ] prolonged bleeding.   Genitourinary: [ ] hematuria. [ ] flank pain.   Endocrine: [ ] significant change in weight. [ ] intolerance to heat and cold.     Review of systems [x ] otherwise negative, [ ] otherwise unable to obtain    FH: no family history of sudden cardiac death in first degree relatives    SH: [ ] tobacco, [ ] alcohol, [ ] drugs    aluminum hydroxide/magnesium hydroxide/simethicone Suspension 30 milliLiter(s) Oral every 6 hours PRN  atorvastatin 20 milliGRAM(s) Oral at bedtime  donepezil 5 milliGRAM(s) Oral at bedtime  hydrALAZINE 25 milliGRAM(s) Oral three times a day  hydrochlorothiazide 25 milliGRAM(s) Oral daily  losartan 100 milliGRAM(s) Oral daily  melatonin 3 milliGRAM(s) Oral at bedtime  NIFEdipine XL 30 milliGRAM(s) Oral daily  nitroglycerin     SubLingual 0.4 milliGRAM(s) SubLingual every 5 minutes PRN  polyethylene glycol 3350 17 Gram(s) Oral two times a day  senna 2 Tablet(s) Oral at bedtime                            15.1   12.09 )-----------( 320      ( 02 Nov 2021 06:59 )             45.5       11-02    138  |  101  |  21  ----------------------------<  102<H>  3.9   |  23  |  0.85    Ca    10.0      02 Nov 2021 06:59  Phos  4.2     11-02  Mg     2.10     11-02              T(C): 36.3 (11-02-21 @ 10:00), Max: 36.8 (11-01-21 @ 14:00)  HR: 74 (11-02-21 @ 10:00) (62 - 99)  BP: 132/97 (11-02-21 @ 10:00) (123/65 - 152/79)  RR: 17 (11-02-21 @ 10:00) (16 - 17)  SpO2: 100% (11-02-21 @ 10:00) (96% - 100%)  Wt(kg): --    I&O's Summary      General: Well nourished in no acute distress. Alert and Oriented * 3.   Head: Normocephalic and atraumatic.   Neck: No JVD. No bruits. Supple. Does not appear to be enlarged.   Cardiovascular: + S1,S2 ; RRR Soft systolic murmur at the left lower sternal border. No rubs noted.    Lungs: CTA b/l. No rhonchi, rales or wheezes.   Abdomen: + BS, soft. Non tender. Non distended. No rebound. No guarding.   Extremities: No clubbing/cyanosis/edema.   Neurologic: Moves all four extremities. Full range of motion.   Skin: Warm and moist. The patient's skin has normal elasticity and good skin turgor.   Psychiatric: Appropriate mood and affect.  Musculoskeletal: Normal range of motion, normal strength    Tele: SR 70s    TTE: pending     NST: pending     A/P: 71F with PMH of HTN, HLD, known LBBB, historically preserved LV function and no obs CAD by cath 9/2020 admitted with elevated BP and chest pain.    -pt. chest pain free  -awaiting TTE and NST  -follow up EP  -ID eval with Dr. Reese called for elevated WBC count  -further workup pending above    Jc Razo MD

## 2021-11-02 NOTE — PROVIDER CONTACT NOTE (OTHER) - REASON
Bradycardia
HR 46
Patient HR in 100's with activity, VSS.
Patient had 4 beats of v-tach
Patient had 5 beats of v-tach

## 2021-11-02 NOTE — PROVIDER CONTACT NOTE (OTHER) - SITUATION
Patient had 5 beats of v-tach on tele monitor
Patients HR is 46
New patient from ED bradycardic to 40s, other vital signs, stable, pt asymptomatic
Patient had 4 beats of v-tach on tele monitor
Patient HR in 100's with activity, VSS.

## 2021-11-02 NOTE — PROVIDER CONTACT NOTE (OTHER) - RECOMMENDATIONS
Continue to monitor on tele
Preform EKG and monitor patient
Monitor patient and draw am labs early
provider made aware,will continue to monitor.

## 2021-11-03 LAB
ANION GAP SERPL CALC-SCNC: 10 MMOL/L — SIGNIFICANT CHANGE UP (ref 7–14)
BASOPHILS # BLD AUTO: 0.11 K/UL — SIGNIFICANT CHANGE UP (ref 0–0.2)
BASOPHILS NFR BLD AUTO: 1.1 % — SIGNIFICANT CHANGE UP (ref 0–2)
BUN SERPL-MCNC: 23 MG/DL — SIGNIFICANT CHANGE UP (ref 7–23)
CALCIUM SERPL-MCNC: 10 MG/DL — SIGNIFICANT CHANGE UP (ref 8.4–10.5)
CHLORIDE SERPL-SCNC: 103 MMOL/L — SIGNIFICANT CHANGE UP (ref 98–107)
CO2 SERPL-SCNC: 25 MMOL/L — SIGNIFICANT CHANGE UP (ref 22–31)
CREAT SERPL-MCNC: 0.76 MG/DL — SIGNIFICANT CHANGE UP (ref 0.5–1.3)
EOSINOPHIL # BLD AUTO: 1.5 K/UL — HIGH (ref 0–0.5)
EOSINOPHIL NFR BLD AUTO: 14.5 % — HIGH (ref 0–6)
GLUCOSE SERPL-MCNC: 104 MG/DL — HIGH (ref 70–99)
HCT VFR BLD CALC: 43.5 % — SIGNIFICANT CHANGE UP (ref 34.5–45)
HGB BLD-MCNC: 14.3 G/DL — SIGNIFICANT CHANGE UP (ref 11.5–15.5)
IANC: 5.24 K/UL — SIGNIFICANT CHANGE UP (ref 1.5–8.5)
IMM GRANULOCYTES NFR BLD AUTO: 0.3 % — SIGNIFICANT CHANGE UP (ref 0–1.5)
LYMPHOCYTES # BLD AUTO: 2.85 K/UL — SIGNIFICANT CHANGE UP (ref 1–3.3)
LYMPHOCYTES # BLD AUTO: 27.5 % — SIGNIFICANT CHANGE UP (ref 13–44)
MAGNESIUM SERPL-MCNC: 2.1 MG/DL — SIGNIFICANT CHANGE UP (ref 1.6–2.6)
MCHC RBC-ENTMCNC: 27.9 PG — SIGNIFICANT CHANGE UP (ref 27–34)
MCHC RBC-ENTMCNC: 32.9 GM/DL — SIGNIFICANT CHANGE UP (ref 32–36)
MCV RBC AUTO: 85 FL — SIGNIFICANT CHANGE UP (ref 80–100)
MONOCYTES # BLD AUTO: 0.65 K/UL — SIGNIFICANT CHANGE UP (ref 0–0.9)
MONOCYTES NFR BLD AUTO: 6.3 % — SIGNIFICANT CHANGE UP (ref 2–14)
NEUTROPHILS # BLD AUTO: 5.24 K/UL — SIGNIFICANT CHANGE UP (ref 1.8–7.4)
NEUTROPHILS NFR BLD AUTO: 50.3 % — SIGNIFICANT CHANGE UP (ref 43–77)
NRBC # BLD: 0 /100 WBCS — SIGNIFICANT CHANGE UP
NRBC # FLD: 0 K/UL — SIGNIFICANT CHANGE UP
PHOSPHATE SERPL-MCNC: 3.9 MG/DL — SIGNIFICANT CHANGE UP (ref 2.5–4.5)
PLATELET # BLD AUTO: 281 K/UL — SIGNIFICANT CHANGE UP (ref 150–400)
POTASSIUM SERPL-MCNC: 4 MMOL/L — SIGNIFICANT CHANGE UP (ref 3.5–5.3)
POTASSIUM SERPL-SCNC: 4 MMOL/L — SIGNIFICANT CHANGE UP (ref 3.5–5.3)
RBC # BLD: 5.12 M/UL — SIGNIFICANT CHANGE UP (ref 3.8–5.2)
RBC # FLD: 14.2 % — SIGNIFICANT CHANGE UP (ref 10.3–14.5)
SODIUM SERPL-SCNC: 138 MMOL/L — SIGNIFICANT CHANGE UP (ref 135–145)
WBC # BLD: 10.38 K/UL — SIGNIFICANT CHANGE UP (ref 3.8–10.5)
WBC # FLD AUTO: 10.38 K/UL — SIGNIFICANT CHANGE UP (ref 3.8–10.5)

## 2021-11-03 RX ORDER — ACETAMINOPHEN 500 MG
650 TABLET ORAL EVERY 6 HOURS
Refills: 0 | Status: DISCONTINUED | OUTPATIENT
Start: 2021-11-03 | End: 2021-11-05

## 2021-11-03 RX ORDER — ACETAMINOPHEN 500 MG
650 TABLET ORAL EVERY 6 HOURS
Refills: 0 | Status: DISCONTINUED | OUTPATIENT
Start: 2021-11-03 | End: 2021-11-03

## 2021-11-03 RX ADMIN — DONEPEZIL HYDROCHLORIDE 5 MILLIGRAM(S): 10 TABLET, FILM COATED ORAL at 21:59

## 2021-11-03 RX ADMIN — Medication 25 MILLIGRAM(S): at 14:55

## 2021-11-03 RX ADMIN — Medication 650 MILLIGRAM(S): at 03:52

## 2021-11-03 RX ADMIN — LOSARTAN POTASSIUM 100 MILLIGRAM(S): 100 TABLET, FILM COATED ORAL at 05:05

## 2021-11-03 RX ADMIN — Medication 25 MILLIGRAM(S): at 21:59

## 2021-11-03 RX ADMIN — Medication 3 MILLIGRAM(S): at 21:58

## 2021-11-03 RX ADMIN — Medication 25 MILLIGRAM(S): at 05:05

## 2021-11-03 RX ADMIN — Medication 650 MILLIGRAM(S): at 03:16

## 2021-11-03 RX ADMIN — ATORVASTATIN CALCIUM 20 MILLIGRAM(S): 80 TABLET, FILM COATED ORAL at 21:59

## 2021-11-03 RX ADMIN — Medication 30 MILLIGRAM(S): at 05:05

## 2021-11-03 NOTE — PROGRESS NOTE ADULT - SUBJECTIVE AND OBJECTIVE BOX
Infectious Diseases progress note:    Subjective:  NAD, WBC improved.  No acute o/n events.     ROS:  CONSTITUTIONAL:  No fever, chills, rigors  CARDIOVASCULAR:  No chest pain or palpitations  RESPIRATORY:   No SOB, cough, dyspnea on exertion.  No wheezing  GASTROINTESTINAL:  No abd pain, N/V, diarrhea/constipation  EXTREMITIES:  No swelling or joint pain  GENITOURINARY:  No burning on urination, increased frequency or urgency.  No flank pain  NEUROLOGIC:  No HA, visual disturbances  SKIN: No rashes    Allergies    No Known Allergies    Intolerances        ANTIBIOTICS/RELEVANT:  antimicrobials    immunologic:    OTHER:  acetaminophen     Tablet .. 650 milliGRAM(s) Oral every 6 hours PRN  aluminum hydroxide/magnesium hydroxide/simethicone Suspension 30 milliLiter(s) Oral every 6 hours PRN  atorvastatin 20 milliGRAM(s) Oral at bedtime  donepezil 5 milliGRAM(s) Oral at bedtime  hydrALAZINE 25 milliGRAM(s) Oral three times a day  hydrochlorothiazide 25 milliGRAM(s) Oral daily  losartan 100 milliGRAM(s) Oral daily  melatonin 3 milliGRAM(s) Oral at bedtime  NIFEdipine XL 30 milliGRAM(s) Oral daily  nitroglycerin     SubLingual 0.4 milliGRAM(s) SubLingual every 5 minutes PRN  polyethylene glycol 3350 17 Gram(s) Oral two times a day  senna 2 Tablet(s) Oral at bedtime      Objective:  Vital Signs Last 24 Hrs  T(C): 36.7 (03 Nov 2021 17:15), Max: 36.7 (02 Nov 2021 21:36)  T(F): 98.1 (03 Nov 2021 17:15), Max: 98.1 (03 Nov 2021 05:05)  HR: 70 (03 Nov 2021 17:15) (70 - 84)  BP: 130/70 (03 Nov 2021 17:15) (128/66 - 151/74)  BP(mean): --  RR: 18 (03 Nov 2021 17:15) (16 - 18)  SpO2: 95% (03 Nov 2021 17:15) (95% - 100%)    PHYSICAL EXAM:  Constitutional:NAD  Eyes:AYLEEN, EOMI  Ear/Nose/Throat: no thrush, mucositis.  Moist mucous membranes	  Neck:no JVD, no lymphadenopathy, supple  Respiratory: CTA brady  Cardiovascular: S1S2 RRR, no murmurs  Gastrointestinal:soft, nontender,  nondistended (+) BS  Extremities:no e/e/c  Skin:  no rashes, open wounds or ulcerations        LABS:                        14.3   10.38 )-----------( 281      ( 03 Nov 2021 07:22 )             43.5     11-03    138  |  103  |  23  ----------------------------<  104<H>  4.0   |  25  |  0.76    Ca    10.0      03 Nov 2021 07:22  Phos  3.9     11-03  Mg     2.10     11-03      Auto Eosinophil %: 14.5 % (11.03.21 @ 07:22)       MICROBIOLOGY:    < from: Xray Chest 2 Views PA/Lat (10.30.21 @ 15:04) >  FINDINGS:    Cardiac/mediastinum/hilum: Prominent-appearing cardiac and mediastinal silhouettes.    Lung parenchyma/Pleura: Rounded opacity within the right lower lung appears overall unchanged compared to study on 9/2/2020. The lungs are otherwise clear. There is no pleural effusion. There is no pneumothorax.    Skeleton/soft tissues: No acute osseous abnormalities.    IMPRESSION:    No pulmonary vascular congestion or pleural effusions.    Rounded opacity within the right lower lung appears overall stable compared to prior study. Recommend correlation with recent CT chest and continued follow-up.    < end of copied text >        RADIOLOGY & ADDITIONAL STUDIES:

## 2021-11-03 NOTE — PROGRESS NOTE ADULT - SUBJECTIVE AND OBJECTIVE BOX
EP ATTENDING    tele: NSR, SENG, no malignant events    she denies palpitations, syncope, nor angina, ROS otherwise -      DATE OF SERVICE - 11-03-21    Review of Systems:   Constitutional: [ ] fevers, [ ] chills.   Skin: [ ] dry skin. [ ] rashes.  Psychiatric: [ ] depression, [ ] anxiety.   Gastrointestinal: [ ] BRBPR, [ ] melena.   Neurological: [ ] confusion. [ ] seizures. [ ] shuffling gait.   Ears,Nose,Mouth and Throat: [ ] ear pain [ ] sore throat.   Eyes: [ ] diplopia.   Respiratory: [ ] hemoptysis. [ ] shortness of breath  Cardiovascular: See HPI above  Hematologic/Lymphatic: [ ] anemia. [ ] painful nodes. [ ] prolonged bleeding.   Genitourinary: [ ] hematuria. [ ] flank pain.   Endocrine: [ ] significant change in weight. [ ] intolerance to heat and cold.     Review of systems [ x] otherwise negative, [ ] otherwise unable to obtain    FH: no family history of sudden cardiac death in first degree relatives    SH: [ ] tobacco, [ ] alcohol, [ ] drugs    acetaminophen     Tablet .. 650 milliGRAM(s) Oral every 6 hours PRN  aluminum hydroxide/magnesium hydroxide/simethicone Suspension 30 milliLiter(s) Oral every 6 hours PRN  atorvastatin 20 milliGRAM(s) Oral at bedtime  donepezil 5 milliGRAM(s) Oral at bedtime  hydrALAZINE 25 milliGRAM(s) Oral three times a day  hydrochlorothiazide 25 milliGRAM(s) Oral daily  losartan 100 milliGRAM(s) Oral daily  melatonin 3 milliGRAM(s) Oral at bedtime  NIFEdipine XL 30 milliGRAM(s) Oral daily  nitroglycerin     SubLingual 0.4 milliGRAM(s) SubLingual every 5 minutes PRN  polyethylene glycol 3350 17 Gram(s) Oral two times a day  senna 2 Tablet(s) Oral at bedtime                            14.3   10.38 )-----------( 281      ( 03 Nov 2021 07:22 )             43.5       11-03    138  |  103  |  23  ----------------------------<  104<H>  4.0   |  25  |  0.76    Ca    10.0      03 Nov 2021 07:22  Phos  3.9     11-03  Mg     2.10     11-03              T(C): 36.6 (11-03-21 @ 10:35), Max: 36.7 (11-02-21 @ 21:36)  HR: 74 (11-03-21 @ 10:35) (69 - 84)  BP: 128/66 (11-03-21 @ 10:35) (128/66 - 151/74)  RR: 17 (11-03-21 @ 10:35) (16 - 17)  SpO2: 96% (11-03-21 @ 10:35) (96% - 100%)  Wt(kg): --    I&O's Summary      General: Well nourished in no acute distress. Alert and Oriented * 3.   Head: Normocephalic and atraumatic.   Neck: No JVD. No bruits. Supple. Does not appear to be enlarged.   Cardiovascular: + S1,S2 ; RRR Soft systolic murmur at the left lower sternal border. No rubs noted.    Lungs: CTA b/l. No rhonchi, rales or wheezes.   Abdomen: + BS, soft. Non tender. Non distended. No rebound. No guarding.   Extremities: No clubbing/cyanosis/edema.   Neurologic: Moves all four extremities. Full range of motion.   Skin: Warm and moist. The patient's skin has normal elasticity and good skin turgor.   Psychiatric: Appropriate mood and affect.  Musculoskeletal: Normal range of motion, normal strength        TTE: moderate global LV dysfunction  NST: no ischemia, no infarct, findings consistent with a non-ischemic cardiomyopathy      A/P) 70 y/o female PMH HTN, hyperlipidemia, moderate LV dysfunction from a non-ischemic cardiomyopathy (non-obstructive CAD on cath 9/2020) a/w hypertensive urgency. EP called for periods of asymptomatic sinus bradycardia.    -medical therapy for moderate LV dysfunction from a non-ischemic cardiomyopathy as per cardiology  -would not recommend a pacemaker at this time as she's asymptomatic and clearly has chronotropic competence on telemetry  -no further inpatient EP workup expected  -remainder of care as per medicine and cardiology  -consider restarting low dose coreg 3.125  -if her LVEF ever becomes < 35% the next step would be a BiV-ICD      Haim Lance M.D., [facility]  Cardiac Electrophysiology  [facility]  [address]  [place] [address]  [email or web address]    office [phone]  pager [phone]

## 2021-11-03 NOTE — PROGRESS NOTE ADULT - ATTENDING COMMENTS
Patient seen and examined.  Agree with above.   NST with no ischemia or infarct with last cath demonstrating no significant CAD  Medical management of NICM recommended  Appreciate EP eval - will restart coreg and continue with losartan for medical management of NICM  ID workup in progress  DC planning when ok with ID    Jc Razo MD

## 2021-11-03 NOTE — PROGRESS NOTE ADULT - SUBJECTIVE AND OBJECTIVE BOX
Date of service: 11/03/21    chief complaint: chest pain     extended hpi: 71F with PMH of HTN, HLD, known LBBB, historically preserved LV function and no obs CAD by cath 9/2020 admitted with elevated BP and chest pain.    S: no chest pain or sob; ros otherwise negative.     Review of Systems:   Constitutional: [ ] fevers, [ ] chills.   Skin: [ ] dry skin. [ ] rashes.  Psychiatric: [ ] depression, [ ] anxiety.   Gastrointestinal: [ ] BRBPR, [ ] melena.   Neurological: [ ] confusion. [ ] seizures. [ ] shuffling gait.   Ears,Nose,Mouth and Throat: [ ] ear pain [ ] sore throat.   Eyes: [ ] diplopia.   Respiratory: [ ] hemoptysis. [ ] shortness of breath  Cardiovascular: See HPI above  Hematologic/Lymphatic: [ ] anemia. [ ] painful nodes. [ ] prolonged bleeding.   Genitourinary: [ ] hematuria. [ ] flank pain.   Endocrine: [ ] significant change in weight. [ ] intolerance to heat and cold.     Review of systems [x ] otherwise negative, [ ] otherwise unable to obtain    FH: no family history of sudden cardiac death in first degree relatives    SH: [ ] tobacco, [ ] alcohol, [ ] drugs    acetaminophen     Tablet .. 650 milliGRAM(s) Oral every 6 hours PRN  aluminum hydroxide/magnesium hydroxide/simethicone Suspension 30 milliLiter(s) Oral every 6 hours PRN  atorvastatin 20 milliGRAM(s) Oral at bedtime  donepezil 5 milliGRAM(s) Oral at bedtime  hydrALAZINE 25 milliGRAM(s) Oral three times a day  hydrochlorothiazide 25 milliGRAM(s) Oral daily  losartan 100 milliGRAM(s) Oral daily  melatonin 3 milliGRAM(s) Oral at bedtime  NIFEdipine XL 30 milliGRAM(s) Oral daily  nitroglycerin     SubLingual 0.4 milliGRAM(s) SubLingual every 5 minutes PRN  polyethylene glycol 3350 17 Gram(s) Oral two times a day  senna 2 Tablet(s) Oral at bedtime                            14.3   10.38 )-----------( 281      ( 03 Nov 2021 07:22 )             43.5       11-03    138  |  103  |  23  ----------------------------<  104<H>  4.0   |  25  |  0.76    Ca    10.0      03 Nov 2021 07:22  Phos  3.9     11-03  Mg     2.10     11-03      T(C): 36.6 (11-03-21 @ 10:35), Max: 36.7 (11-02-21 @ 21:36)  HR: 74 (11-03-21 @ 10:35) (74 - 84)  BP: 128/66 (11-03-21 @ 10:35) (128/66 - 151/74)  RR: 17 (11-03-21 @ 10:35) (16 - 17)  SpO2: 96% (11-03-21 @ 10:35) (96% - 100%)      General: Well nourished in no acute distress. Alert and Oriented * 3.   Head: Normocephalic and atraumatic.   Neck: No JVD. No bruits. Supple. Does not appear to be enlarged.   Cardiovascular: + S1,S2 ; RRR Soft systolic murmur at the left lower sternal border. No rubs noted.    Lungs: CTA b/l. No rhonchi, rales or wheezes.   Abdomen: + BS, soft. Non tender. Non distended. No rebound. No guarding.   Extremities: No clubbing/cyanosis/edema.   Neurologic: Moves all four extremities. Full range of motion.   Skin: Warm and moist. The patient's skin has normal elasticity and good skin turgor.   Psychiatric: Appropriate mood and affect.  Musculoskeletal: Normal range of motion, normal strength      Tele: SR 70s    < from: Transthoracic Echocardiogram (11.02.21 @ 08:20) >  CONCLUSIONS:  1. Mitral annular calcification, otherwise normal mitral  valve.  2. Normal left ventricular internal dimensions and wall  thicknesses.  3. Moderate global left ventricular systolic dysfunction.  Septal motion is consistent with LBBB.  4. Normal right ventricular size and function.  ------------------------------------------------------------------------  Confirmed on  11/2/2021 - 16:33:07 by Sydnee Bennett M.D. RPVI    < end of copied text >      < from: Nuclear Stress Test-Pharmacologic (11.02.21 @ 10:03) >  GATED ANALYSIS:  Post-stress gated wall motion analysis was performed (LVEF  >= 37 %;LVEDV = 131 ml.), revealing mild to moderate  global hypokinesis, with paradoxical motion of the septal  wall. RV function appeared normal.  ------------------------------------------------------------------------  IMPRESSIONS:Abnormal Study  * Myocardial Perfusion SPECT results are abnormal.  * Review of raw data shows: The study is of good technical  quality.  * There is a small, mild defect in basal inferior wall, no  longer significant withprone imaging.  * No clear evidence of ischemia or infarct.  * However, findings suggest non-ischemic cardiomyopathy.  The left ventricle was mildly dilated at baseline.  Post-stress gated wall motion analysis was performed (LVEF  >= 37 %;LVEDV = 131ml.), revealing mild to moderate  global hypokinesis, with paradoxical motion of the septal  wall. RV function appeared normal.  ------------------------------------------------------------------------  Confirmed on  11/2/2021 - 15:26:54 by Gilberto Fernandez M.D.    < end of copied text >      < from: Cardiac Cath Lab - Adult (09.08.20 @ 14:28) >  CORONARY VESSELS: The coronary circulation is right dominant.  LM:   --  LM: Normal.  LAD:   --  Proximal LAD: Normal.  --  Mid LAD: Angiography showed minor luminal irregularities with no flow  limiting lesions.  --  Distal LAD: Angiography showed minor luminal irregularities with no  flow limiting lesions.  --  D1: Normal.  --  D2: Normal.  CX:   --  Circumflex: Angiography showed minor luminal irregularities with  no flow limiting lesions.  --  OM1: Angiography showed minor luminal irregularities with no flow  limiting lesions.  RCA:   --  RCA: Normal.  --  RPDA: Normal.  --  RPLS: Normal.  COMPLICATIONS: There were no complications.  DIAGNOSTIC RECOMMENDATIONS: Medical management and risk factor modification  is recommended.  Prepared and signed by  Jc Razo M.D.  Signed 09/08/2020 15:01:53    < end of copied text >      A/P: 71F with PMH of HTN, HLD, known LBBB, historically preserved LV function and no obs CAD by cath 9/2020 admitted with elevated BP and chest pain.    -pt. chest pain free  -TTE and NST noted above, d/w patient and grandson  -Pt with known NICM, cont Medical management  -ID eval with Dr. Reese called for elevated WBC count  -further workup pending above  -DC planning pending ID w/u  -f/u with Dr Neff 11/15 at 1030 AM  [phone]

## 2021-11-03 NOTE — PROGRESS NOTE ADULT - SUBJECTIVE AND OBJECTIVE BOX
Patient is a 71y old  Female who presents with a chief complaint of chest pain (03 Nov 2021 19:50)      SUBJECTIVE / OVERNIGHT EVENTS:    Events noted.  CONSTITUTIONAL: No fever,  or fatigue  RESPIRATORY: No cough, wheezing,  No shortness of breath  CARDIOVASCULAR: No chest pain, palpitations, dizziness, or leg swelling  GASTROINTESTINAL: No abdominal or epigastric pain.   NEUROLOGICAL: No headaches,     MEDICATIONS  (STANDING):  atorvastatin 20 milliGRAM(s) Oral at bedtime  donepezil 5 milliGRAM(s) Oral at bedtime  hydrALAZINE 25 milliGRAM(s) Oral three times a day  hydrochlorothiazide 25 milliGRAM(s) Oral daily  losartan 100 milliGRAM(s) Oral daily  melatonin 3 milliGRAM(s) Oral at bedtime  NIFEdipine XL 30 milliGRAM(s) Oral daily  polyethylene glycol 3350 17 Gram(s) Oral two times a day  senna 2 Tablet(s) Oral at bedtime    MEDICATIONS  (PRN):  acetaminophen     Tablet .. 650 milliGRAM(s) Oral every 6 hours PRN Temp greater or equal to 38C (100.4F), Mild Pain (1 - 3)  aluminum hydroxide/magnesium hydroxide/simethicone Suspension 30 milliLiter(s) Oral every 6 hours PRN Dyspepsia  nitroglycerin     SubLingual 0.4 milliGRAM(s) SubLingual every 5 minutes PRN Chest Pain        CAPILLARY BLOOD GLUCOSE        I&O's Summary      T(C): 36.8 (11-03-21 @ 21:30), Max: 36.8 (11-03-21 @ 21:30)  HR: 75 (11-03-21 @ 21:30) (70 - 84)  BP: 133/77 (11-03-21 @ 21:30) (128/66 - 142/81)  RR: 18 (11-03-21 @ 21:30) (16 - 18)  SpO2: 98% (11-03-21 @ 21:30) (95% - 98%)    PHYSICAL EXAM:  GENERAL: NAD  NECK: Supple, No JVD  CHEST/LUNG: Clear to auscultation bilaterally; No wheezing.  HEART: Regular rate and rhythm; No murmurs, rubs, or gallops  ABDOMEN: Soft, Nontender, Nondistended; Bowel sounds present  EXTREMITIES:   No edema  NEUROLOGY: AAO X 3      LABS:                        14.3   10.38 )-----------( 281      ( 03 Nov 2021 07:22 )             43.5     11-03    138  |  103  |  23  ----------------------------<  104<H>  4.0   |  25  |  0.76    Ca    10.0      03 Nov 2021 07:22  Phos  3.9     11-03  Mg     2.10     11-03              CAPILLARY BLOOD GLUCOSE            RADIOLOGY & ADDITIONAL TESTS:    Imaging Personally Reviewed:    Consultant(s) Notes Reviewed:      Care Discussed with Consultants/Other Providers:    Cheng Garnica MD, CMD, FACP    [address] [place] [address]  Office Tel: [phone]  Cell: [phone]

## 2021-11-04 RX ORDER — CARVEDILOL PHOSPHATE 80 MG/1
3.12 CAPSULE, EXTENDED RELEASE ORAL EVERY 12 HOURS
Refills: 0 | Status: DISCONTINUED | OUTPATIENT
Start: 2021-11-04 | End: 2021-11-05

## 2021-11-04 RX ADMIN — CARVEDILOL PHOSPHATE 3.12 MILLIGRAM(S): 80 CAPSULE, EXTENDED RELEASE ORAL at 17:08

## 2021-11-04 RX ADMIN — Medication 25 MILLIGRAM(S): at 05:09

## 2021-11-04 RX ADMIN — Medication 30 MILLIGRAM(S): at 05:09

## 2021-11-04 RX ADMIN — DONEPEZIL HYDROCHLORIDE 5 MILLIGRAM(S): 10 TABLET, FILM COATED ORAL at 21:35

## 2021-11-04 RX ADMIN — Medication 25 MILLIGRAM(S): at 14:28

## 2021-11-04 RX ADMIN — Medication 25 MILLIGRAM(S): at 21:35

## 2021-11-04 RX ADMIN — Medication 3 MILLIGRAM(S): at 21:35

## 2021-11-04 RX ADMIN — SENNA PLUS 2 TABLET(S): 8.6 TABLET ORAL at 21:35

## 2021-11-04 RX ADMIN — LOSARTAN POTASSIUM 100 MILLIGRAM(S): 100 TABLET, FILM COATED ORAL at 05:09

## 2021-11-04 RX ADMIN — ATORVASTATIN CALCIUM 20 MILLIGRAM(S): 80 TABLET, FILM COATED ORAL at 21:35

## 2021-11-04 NOTE — PROGRESS NOTE ADULT - SUBJECTIVE AND OBJECTIVE BOX
Patient is a 71y old  Female who presents with a chief complaint of chest pain (04 Nov 2021 17:33)      SUBJECTIVE / OVERNIGHT EVENTS:    Events noted.  CONSTITUTIONAL: No fever,  or fatigue  RESPIRATORY: No cough, wheezing,  No shortness of breath  CARDIOVASCULAR: No chest pain, palpitations, dizziness, or leg swelling  GASTROINTESTINAL: No abdominal or epigastric pain.   NEUROLOGICAL: No headaches,     MEDICATIONS  (STANDING):  atorvastatin 20 milliGRAM(s) Oral at bedtime  carvedilol 3.125 milliGRAM(s) Oral every 12 hours  donepezil 5 milliGRAM(s) Oral at bedtime  hydrALAZINE 25 milliGRAM(s) Oral three times a day  hydrochlorothiazide 25 milliGRAM(s) Oral daily  losartan 100 milliGRAM(s) Oral daily  melatonin 3 milliGRAM(s) Oral at bedtime  NIFEdipine XL 30 milliGRAM(s) Oral daily  polyethylene glycol 3350 17 Gram(s) Oral two times a day  senna 2 Tablet(s) Oral at bedtime    MEDICATIONS  (PRN):  acetaminophen     Tablet .. 650 milliGRAM(s) Oral every 6 hours PRN Temp greater or equal to 38C (100.4F), Mild Pain (1 - 3)  aluminum hydroxide/magnesium hydroxide/simethicone Suspension 30 milliLiter(s) Oral every 6 hours PRN Dyspepsia  nitroglycerin     SubLingual 0.4 milliGRAM(s) SubLingual every 5 minutes PRN Chest Pain        CAPILLARY BLOOD GLUCOSE        I&O's Summary      T(C): 36.6 (11-04-21 @ 20:30), Max: 36.7 (11-04-21 @ 13:09)  HR: 68 (11-04-21 @ 20:30) (66 - 96)  BP: 140/67 (11-04-21 @ 20:30) (131/54 - 150/87)  RR: 18 (11-04-21 @ 20:30) (16 - 18)  SpO2: 96% (11-04-21 @ 20:30) (96% - 99%)    PHYSICAL EXAM:    NECK: Supple, No JVD  CHEST/LUNG: Clear to auscultation bilaterally; No wheezing.  HEART: Regular rate and rhythm; No murmurs, rubs, or gallops  ABDOMEN: Soft, Nontender, Nondistended; Bowel sounds present  EXTREMITIES:   No edema  NEUROLOGY: AAO       LABS:                        14.3   10.38 )-----------( 281      ( 03 Nov 2021 07:22 )             43.5     11-03    138  |  103  |  23  ----------------------------<  104<H>  4.0   |  25  |  0.76    Ca    10.0      03 Nov 2021 07:22  Phos  3.9     11-03  Mg     2.10     11-03              CAPILLARY BLOOD GLUCOSE            RADIOLOGY & ADDITIONAL TESTS:    Imaging Personally Reviewed:    Consultant(s) Notes Reviewed:      Care Discussed with Consultants/Other Providers:    Cheng Garnica MD, CMD, FACP    [address] [place] [address]  Office Tel: [phone]  Cell: [phone]

## 2021-11-04 NOTE — PROGRESS NOTE ADULT - SUBJECTIVE AND OBJECTIVE BOX
Date of service: 11/04/21    chief complaint: chest pain     extended hpi: 71F with PMH of HTN, HLD, known LBBB, historically preserved LV function and no obs CAD by cath 9/2020 admitted with elevated BP and chest pain.    S: no chest pain or sob; ros otherwise negative.       Review of Systems:   Constitutional: [ ] fevers, [ ] chills.   Skin: [ ] dry skin. [ ] rashes.  Psychiatric: [ ] depression, [ ] anxiety.   Gastrointestinal: [ ] BRBPR, [ ] melena.   Neurological: [ ] confusion. [ ] seizures. [ ] shuffling gait.   Ears,Nose,Mouth and Throat: [ ] ear pain [ ] sore throat.   Eyes: [ ] diplopia.   Respiratory: [ ] hemoptysis. [ ] shortness of breath  Cardiovascular: See HPI above  Hematologic/Lymphatic: [ ] anemia. [ ] painful nodes. [ ] prolonged bleeding.   Genitourinary: [ ] hematuria. [ ] flank pain.   Endocrine: [ ] significant change in weight. [ ] intolerance to heat and cold.     Review of systems [x ] otherwise negative, [ ] otherwise unable to obtain    FH: no family history of sudden cardiac death in first degree relatives    SH: [ ] tobacco, [ ] alcohol, [ ] drugs    acetaminophen     Tablet .. 650 milliGRAM(s) Oral every 6 hours PRN  aluminum hydroxide/magnesium hydroxide/simethicone Suspension 30 milliLiter(s) Oral every 6 hours PRN  atorvastatin 20 milliGRAM(s) Oral at bedtime  carvedilol 3.125 milliGRAM(s) Oral every 12 hours  donepezil 5 milliGRAM(s) Oral at bedtime  hydrALAZINE 25 milliGRAM(s) Oral three times a day  hydrochlorothiazide 25 milliGRAM(s) Oral daily  losartan 100 milliGRAM(s) Oral daily  melatonin 3 milliGRAM(s) Oral at bedtime  NIFEdipine XL 30 milliGRAM(s) Oral daily  nitroglycerin     SubLingual 0.4 milliGRAM(s) SubLingual every 5 minutes PRN  polyethylene glycol 3350 17 Gram(s) Oral two times a day  senna 2 Tablet(s) Oral at bedtime                            14.3   10.38 )-----------( 281      ( 03 Nov 2021 07:22 )             43.5       11-03    138  |  103  |  23  ----------------------------<  104<H>  4.0   |  25  |  0.76    Ca    10.0      03 Nov 2021 07:22  Phos  3.9     11-03  Mg     2.10     11-03      T(C): 36.7 (11-04-21 @ 13:09), Max: 36.8 (11-03-21 @ 21:30)  HR: 96 (11-04-21 @ 13:09) (66 - 96)  BP: 150/87 (11-04-21 @ 13:09) (130/70 - 150/87)  RR: 18 (11-04-21 @ 13:09) (16 - 18)  SpO2: 99% (11-04-21 @ 13:09) (95% - 99%)      General: Well nourished in no acute distress. Alert and Oriented * 3.   Head: Normocephalic and atraumatic.   Neck: No JVD. No bruits. Supple. Does not appear to be enlarged.   Cardiovascular: + S1,S2 ; RRR Soft systolic murmur at the left lower sternal border. No rubs noted.    Lungs: CTA b/l. No rhonchi, rales or wheezes.   Abdomen: + BS, soft. Non tender. Non distended. No rebound. No guarding.   Extremities: No clubbing/cyanosis/edema.   Neurologic: Moves all four extremities. Full range of motion.   Skin: Warm and moist. The patient's skin has normal elasticity and good skin turgor.   Psychiatric: Appropriate mood and affect.  Musculoskeletal: Normal range of motion, normal strength      Tele: SR 70s    < from: Transthoracic Echocardiogram (11.02.21 @ 08:20) >  CONCLUSIONS:  1. Mitral annular calcification, otherwise normal mitral  valve.  2. Normal left ventricular internal dimensions and wall  thicknesses.  3. Moderate global left ventricular systolic dysfunction.  Septal motion is consistent with LBBB.  4. Normal right ventricular size and function.  ------------------------------------------------------------------------  Confirmed on  11/2/2021 - 16:33:07 by Sydnee Bennett M.D. RPVI    < end of copied text >      < from: Nuclear Stress Test-Pharmacologic (11.02.21 @ 10:03) >  GATED ANALYSIS:  Post-stress gated wall motion analysis was performed (LVEF  >= 37 %;LVEDV = 131 ml.), revealing mild to moderate  global hypokinesis, with paradoxical motion of the septal  wall. RV function appeared normal.  ------------------------------------------------------------------------  IMPRESSIONS:Abnormal Study  * Myocardial Perfusion SPECT results are abnormal.  * Review of raw data shows: The study is of good technical  quality.  * There is a small, mild defect in basal inferior wall, no  longer significant withprone imaging.  * No clear evidence of ischemia or infarct.  * However, findings suggest non-ischemic cardiomyopathy.  The left ventricle was mildly dilated at baseline.  Post-stress gated wall motion analysis was performed (LVEF  >= 37 %;LVEDV = 131ml.), revealing mild to moderate  global hypokinesis, with paradoxical motion of the septal  wall. RV function appeared normal.  ------------------------------------------------------------------------  Confirmed on  11/2/2021 - 15:26:54 by Gilberto Fernandez M.D.    < end of copied text >      < from: Cardiac Cath Lab - Adult (09.08.20 @ 14:28) >  CORONARY VESSELS: The coronary circulation is right dominant.  LM:   --  LM: Normal.  LAD:   --  Proximal LAD: Normal.  --  Mid LAD: Angiography showed minor luminal irregularities with no flow  limiting lesions.  --  Distal LAD: Angiography showed minor luminal irregularities with no  flow limiting lesions.  --  D1: Normal.  --  D2: Normal.  CX:   --  Circumflex: Angiography showed minor luminal irregularities with  no flow limiting lesions.  --  OM1: Angiography showed minor luminal irregularities with no flow  limiting lesions.  RCA:   --  RCA: Normal.  --  RPDA: Normal.  --  RPLS: Normal.  COMPLICATIONS: There were no complications.  DIAGNOSTIC RECOMMENDATIONS: Medical management and risk factor modification  is recommended.  Prepared and signed by  Jc Razo M.D.  Signed 09/08/2020 15:01:53    < end of copied text >      A/P: 71F with PMH of HTN, HLD, known LBBB, historically preserved LV function and no obs CAD by cath 9/2020 admitted with elevated BP and chest pain.    -pt. chest pain free  -TTE and NST noted above, d/w patient and grandson  -Pt with known NICM, cont Medical management  -ID eval with Dr. Reese called for elevated WBC count-- f/u results of tests as OP  -NSVT today, EP f/u  -f/u with Dr Neff 11/15 at 1030 AM  [phone]     Date of service: 11/04/21    chief complaint: chest pain     extended hpi: 71F with PMH of HTN, HLD, known LBBB, historically preserved LV function and no obs CAD by cath 9/2020 admitted with elevated BP and chest pain.    S: no chest pain or sob; ros otherwise negative.       Review of Systems:   Constitutional: [ ] fevers, [ ] chills.   Skin: [ ] dry skin. [ ] rashes.  Psychiatric: [ ] depression, [ ] anxiety.   Gastrointestinal: [ ] BRBPR, [ ] melena.   Neurological: [ ] confusion. [ ] seizures. [ ] shuffling gait.   Ears,Nose,Mouth and Throat: [ ] ear pain [ ] sore throat.   Eyes: [ ] diplopia.   Respiratory: [ ] hemoptysis. [ ] shortness of breath  Cardiovascular: See HPI above  Hematologic/Lymphatic: [ ] anemia. [ ] painful nodes. [ ] prolonged bleeding.   Genitourinary: [ ] hematuria. [ ] flank pain.   Endocrine: [ ] significant change in weight. [ ] intolerance to heat and cold.     Review of systems [x ] otherwise negative, [ ] otherwise unable to obtain    FH: no family history of sudden cardiac death in first degree relatives    SH: [ ] tobacco, [ ] alcohol, [ ] drugs    acetaminophen     Tablet .. 650 milliGRAM(s) Oral every 6 hours PRN  aluminum hydroxide/magnesium hydroxide/simethicone Suspension 30 milliLiter(s) Oral every 6 hours PRN  atorvastatin 20 milliGRAM(s) Oral at bedtime  carvedilol 3.125 milliGRAM(s) Oral every 12 hours  donepezil 5 milliGRAM(s) Oral at bedtime  hydrALAZINE 25 milliGRAM(s) Oral three times a day  hydrochlorothiazide 25 milliGRAM(s) Oral daily  losartan 100 milliGRAM(s) Oral daily  melatonin 3 milliGRAM(s) Oral at bedtime  NIFEdipine XL 30 milliGRAM(s) Oral daily  nitroglycerin     SubLingual 0.4 milliGRAM(s) SubLingual every 5 minutes PRN  polyethylene glycol 3350 17 Gram(s) Oral two times a day  senna 2 Tablet(s) Oral at bedtime                            14.3   10.38 )-----------( 281      ( 03 Nov 2021 07:22 )             43.5       11-03    138  |  103  |  23  ----------------------------<  104<H>  4.0   |  25  |  0.76    Ca    10.0      03 Nov 2021 07:22  Phos  3.9     11-03  Mg     2.10     11-03      T(C): 36.7 (11-04-21 @ 13:09), Max: 36.8 (11-03-21 @ 21:30)  HR: 96 (11-04-21 @ 13:09) (66 - 96)  BP: 150/87 (11-04-21 @ 13:09) (130/70 - 150/87)  RR: 18 (11-04-21 @ 13:09) (16 - 18)  SpO2: 99% (11-04-21 @ 13:09) (95% - 99%)      General: Well nourished in no acute distress. Alert and Oriented * 3.   Head: Normocephalic and atraumatic.   Neck: No JVD. No bruits. Supple. Does not appear to be enlarged.   Cardiovascular: + S1,S2 ; RRR Soft systolic murmur at the left lower sternal border. No rubs noted.    Lungs: CTA b/l. No rhonchi, rales or wheezes.   Abdomen: + BS, soft. Non tender. Non distended. No rebound. No guarding.   Extremities: No clubbing/cyanosis/edema.   Neurologic: Moves all four extremities. Full range of motion.   Skin: Warm and moist. The patient's skin has normal elasticity and good skin turgor.   Psychiatric: Appropriate mood and affect.  Musculoskeletal: Normal range of motion, normal strength      Tele: SR 70s    < from: Transthoracic Echocardiogram (11.02.21 @ 08:20) >  CONCLUSIONS:  1. Mitral annular calcification, otherwise normal mitral  valve.  2. Normal left ventricular internal dimensions and wall  thicknesses.  3. Moderate global left ventricular systolic dysfunction.  Septal motion is consistent with LBBB.  4. Normal right ventricular size and function.  ------------------------------------------------------------------------  Confirmed on  11/2/2021 - 16:33:07 by Sydnee Bennett M.D. RPVI    < end of copied text >      < from: Nuclear Stress Test-Pharmacologic (11.02.21 @ 10:03) >  GATED ANALYSIS:  Post-stress gated wall motion analysis was performed (LVEF  >= 37 %;LVEDV = 131 ml.), revealing mild to moderate  global hypokinesis, with paradoxical motion of the septal  wall. RV function appeared normal.  ------------------------------------------------------------------------  IMPRESSIONS:Abnormal Study  * Myocardial Perfusion SPECT results are abnormal.  * Review of raw data shows: The study is of good technical  quality.  * There is a small, mild defect in basal inferior wall, no  longer significant withprone imaging.  * No clear evidence of ischemia or infarct.  * However, findings suggest non-ischemic cardiomyopathy.  The left ventricle was mildly dilated at baseline.  Post-stress gated wall motion analysis was performed (LVEF  >= 37 %;LVEDV = 131ml.), revealing mild to moderate  global hypokinesis, with paradoxical motion of the septal  wall. RV function appeared normal.  ------------------------------------------------------------------------  Confirmed on  11/2/2021 - 15:26:54 by Gilberto Fernandez M.D.    < end of copied text >      < from: Cardiac Cath Lab - Adult (09.08.20 @ 14:28) >  CORONARY VESSELS: The coronary circulation is right dominant.  LM:   --  LM: Normal.  LAD:   --  Proximal LAD: Normal.  --  Mid LAD: Angiography showed minor luminal irregularities with no flow  limiting lesions.  --  Distal LAD: Angiography showed minor luminal irregularities with no  flow limiting lesions.  --  D1: Normal.  --  D2: Normal.  CX:   --  Circumflex: Angiography showed minor luminal irregularities with  no flow limiting lesions.  --  OM1: Angiography showed minor luminal irregularities with no flow  limiting lesions.  RCA:   --  RCA: Normal.  --  RPDA: Normal.  --  RPLS: Normal.  COMPLICATIONS: There were no complications.  DIAGNOSTIC RECOMMENDATIONS: Medical management and risk factor modification  is recommended.  Prepared and signed by  Jc Razo M.D.  Signed 09/08/2020 15:01:53    < end of copied text >

## 2021-11-04 NOTE — PROGRESS NOTE ADULT - SUBJECTIVE AND OBJECTIVE BOX
Date of service: 11/04/21    chief complaint: chest pain     extended hpi: 71F with PMH of HTN, HLD, known LBBB, historically preserved LV function and no obs CAD by cath 9/2020 admitted with elevated BP and chest pain.    S: no chest pain or sob; ros otherwise negative.     Review of Systems:   Constitutional: [ ] fevers, [ ] chills.   Skin: [ ] dry skin. [ ] rashes.  Psychiatric: [ ] depression, [ ] anxiety.   Gastrointestinal: [ ] BRBPR, [ ] melena.   Neurological: [ ] confusion. [ ] seizures. [ ] shuffling gait.   Ears,Nose,Mouth and Throat: [ ] ear pain [ ] sore throat.   Eyes: [ ] diplopia.   Respiratory: [ ] hemoptysis. [ ] shortness of breath  Cardiovascular: See HPI above  Hematologic/Lymphatic: [ ] anemia. [ ] painful nodes. [ ] prolonged bleeding.   Genitourinary: [ ] hematuria. [ ] flank pain.   Endocrine: [ ] significant change in weight. [ ] intolerance to heat and cold.     Review of systems [x ] otherwise negative, [ ] otherwise unable to obtain    FH: no family history of sudden cardiac death in first degree relatives    SH: [ ] tobacco, [ ] alcohol, [ ] drugs      acetaminophen     Tablet .. 650 milliGRAM(s) Oral every 6 hours PRN  aluminum hydroxide/magnesium hydroxide/simethicone Suspension 30 milliLiter(s) Oral every 6 hours PRN  atorvastatin 20 milliGRAM(s) Oral at bedtime  carvedilol 3.125 milliGRAM(s) Oral every 12 hours  donepezil 5 milliGRAM(s) Oral at bedtime  hydrALAZINE 25 milliGRAM(s) Oral three times a day  hydrochlorothiazide 25 milliGRAM(s) Oral daily  losartan 100 milliGRAM(s) Oral daily  melatonin 3 milliGRAM(s) Oral at bedtime  NIFEdipine XL 30 milliGRAM(s) Oral daily  nitroglycerin     SubLingual 0.4 milliGRAM(s) SubLingual every 5 minutes PRN  polyethylene glycol 3350 17 Gram(s) Oral two times a day  senna 2 Tablet(s) Oral at bedtime                            14.3   10.38 )-----------( 281      ( 03 Nov 2021 07:22 )             43.5       11-03    138  |  103  |  23  ----------------------------<  104<H>  4.0   |  25  |  0.76    Ca    10.0      03 Nov 2021 07:22  Phos  3.9     11-03  Mg     2.10     11-03              T(C): 36.7 (11-04-21 @ 17:12), Max: 36.8 (11-03-21 @ 21:30)  HR: 80 (11-04-21 @ 17:12) (66 - 96)  BP: 150/87 (11-04-21 @ 13:09) (131/54 - 150/87)  RR: 18 (11-04-21 @ 13:09) (16 - 18)  SpO2: 99% (11-04-21 @ 13:09) (97% - 99%)  Wt(kg): --    I&O's Summary        General: Well nourished in no acute distress. Alert and Oriented * 3.   Head: Normocephalic and atraumatic.   Neck: No JVD. No bruits. Supple. Does not appear to be enlarged.   Cardiovascular: + S1,S2 ; RRR Soft systolic murmur at the left lower sternal border. No rubs noted.    Lungs: CTA b/l. No rhonchi, rales or wheezes.   Abdomen: + BS, soft. Non tender. Non distended. No rebound. No guarding.   Extremities: No clubbing/cyanosis/edema.   Neurologic: Moves all four extremities. Full range of motion.   Skin: Warm and moist. The patient's skin has normal elasticity and good skin turgor.   Psychiatric: Appropriate mood and affect.  Musculoskeletal: Normal range of motion, normal strength      Tele: SR 70s    < from: Transthoracic Echocardiogram (11.02.21 @ 08:20) >  CONCLUSIONS:  1. Mitral annular calcification, otherwise normal mitral  valve.  2. Normal left ventricular internal dimensions and wall  thicknesses.  3. Moderate global left ventricular systolic dysfunction.  Septal motion is consistent with LBBB.  4. Normal right ventricular size and function.  ------------------------------------------------------------------------  Confirmed on  11/2/2021 - 16:33:07 by Sydnee Bennett M.D. RPVI    < end of copied text >      < from: Nuclear Stress Test-Pharmacologic (11.02.21 @ 10:03) >  GATED ANALYSIS:  Post-stress gated wall motion analysis was performed (LVEF  >= 37 %;LVEDV = 131 ml.), revealing mild to moderate  global hypokinesis, with paradoxical motion of the septal  wall. RV function appeared normal.  ------------------------------------------------------------------------  IMPRESSIONS:Abnormal Study  * Myocardial Perfusion SPECT results are abnormal.  * Review of raw data shows: The study is of good technical  quality.  * There is a small, mild defect in basal inferior wall, no  longer significant withprone imaging.  * No clear evidence of ischemia or infarct.  * However, findings suggest non-ischemic cardiomyopathy.  The left ventricle was mildly dilated at baseline.  Post-stress gated wall motion analysis was performed (LVEF  >= 37 %;LVEDV = 131ml.), revealing mild to moderate  global hypokinesis, with paradoxical motion of the septal  wall. RV function appeared normal.  ------------------------------------------------------------------------  Confirmed on  11/2/2021 - 15:26:54 by Gilberto Fernandez M.D.    < end of copied text >      < from: Cardiac Cath Lab - Adult (09.08.20 @ 14:28) >  CORONARY VESSELS: The coronary circulation is right dominant.  LM:   --  LM: Normal.  LAD:   --  Proximal LAD: Normal.  --  Mid LAD: Angiography showed minor luminal irregularities with no flow  limiting lesions.  --  Distal LAD: Angiography showed minor luminal irregularities with no  flow limiting lesions.  --  D1: Normal.  --  D2: Normal.  CX:   --  Circumflex: Angiography showed minor luminal irregularities with  no flow limiting lesions.  --  OM1: Angiography showed minor luminal irregularities with no flow  limiting lesions.  RCA:   --  RCA: Normal.  --  RPDA: Normal.  --  RPLS: Normal.  COMPLICATIONS: There were no complications.  DIAGNOSTIC RECOMMENDATIONS: Medical management and risk factor modification  is recommended.  Prepared and signed by  Jc Razo M.D.  Signed 09/08/2020 15:01:53    < end of copied text >      A/P: 71F with PMH of HTN, HLD, known LBBB, historically preserved LV function and no obs CAD by cath 9/2020 admitted with elevated BP and chest pain.    -pt. chest pain free  -TTE and NST noted above, d/w patient and grandson  -Pt with known NICM, cont Medical management recommended at this time  -pt. with 14 beats NSVT on tele - continue with beta blockers and keep K > 4, Mg > 2   -ID workup appreciated   -pt. reported ? foul smelling urine yesterday and today - check UA and follow up ID   -further workup pending above  -DC planning pending ID w/u and if ok with EP   -f/u with Dr Neff 11/15 at 1030 AM  [phone]    Jc Razo MD

## 2021-11-04 NOTE — PROGRESS NOTE ADULT - SUBJECTIVE AND OBJECTIVE BOX
Infectious Diseases progress note:    Subjective:  NAD, afebrile.  No new somatic complaints.  Denies cough/abd pain/diarrhea/rash    ROS:  CONSTITUTIONAL:  No fever, chills, rigors  CARDIOVASCULAR:  No chest pain or palpitations  RESPIRATORY:   No SOB, cough, dyspnea on exertion.  No wheezing  GASTROINTESTINAL:  No abd pain, N/V, diarrhea/constipation  EXTREMITIES:  No swelling or joint pain  GENITOURINARY:  No burning on urination, increased frequency or urgency.  No flank pain  NEUROLOGIC:  No HA, visual disturbances  SKIN: No rashes    Allergies    No Known Allergies    Intolerances        ANTIBIOTICS/RELEVANT:  antimicrobials    immunologic:    OTHER:  acetaminophen     Tablet .. 650 milliGRAM(s) Oral every 6 hours PRN  aluminum hydroxide/magnesium hydroxide/simethicone Suspension 30 milliLiter(s) Oral every 6 hours PRN  atorvastatin 20 milliGRAM(s) Oral at bedtime  carvedilol 3.125 milliGRAM(s) Oral every 12 hours  donepezil 5 milliGRAM(s) Oral at bedtime  hydrALAZINE 25 milliGRAM(s) Oral three times a day  hydrochlorothiazide 25 milliGRAM(s) Oral daily  losartan 100 milliGRAM(s) Oral daily  melatonin 3 milliGRAM(s) Oral at bedtime  NIFEdipine XL 30 milliGRAM(s) Oral daily  nitroglycerin     SubLingual 0.4 milliGRAM(s) SubLingual every 5 minutes PRN  polyethylene glycol 3350 17 Gram(s) Oral two times a day  senna 2 Tablet(s) Oral at bedtime      Objective:  Vital Signs Last 24 Hrs  T(C): 36.7 (04 Nov 2021 13:09), Max: 36.8 (03 Nov 2021 21:30)  T(F): 98 (04 Nov 2021 13:09), Max: 98.3 (03 Nov 2021 21:30)  HR: 96 (04 Nov 2021 13:09) (66 - 96)  BP: 150/87 (04 Nov 2021 13:09) (130/70 - 150/87)  BP(mean): --  RR: 18 (04 Nov 2021 13:09) (16 - 18)  SpO2: 99% (04 Nov 2021 13:09) (95% - 99%)    PHYSICAL EXAM:  Constitutional:NAD  Eyes:AYLEEN, EOMI  Ear/Nose/Throat: no thrush, mucositis.  Moist mucous membranes	  Neck:no JVD, no lymphadenopathy, supple  Respiratory: CTA brady  Cardiovascular: S1S2 RRR, no murmurs  Gastrointestinal:soft, nontender,  nondistended (+) BS  Extremities:no e/e/c  Skin:  no rashes, open wounds or ulcerations        LABS:                        14.3   10.38 )-----------( 281      ( 03 Nov 2021 07:22 )             43.5     11-03    138  |  103  |  23  ----------------------------<  104<H>  4.0   |  25  |  0.76    Ca    10.0      03 Nov 2021 07:22  Phos  3.9     11-03  Mg     2.10     11-03            MICROBIOLOGY:          RADIOLOGY & ADDITIONAL STUDIES:

## 2021-11-05 ENCOUNTER — TRANSCRIPTION ENCOUNTER (OUTPATIENT)
Age: 71
End: 2021-11-05

## 2021-11-05 VITALS
TEMPERATURE: 98 F | HEART RATE: 70 BPM | SYSTOLIC BLOOD PRESSURE: 127 MMHG | RESPIRATION RATE: 18 BRPM | OXYGEN SATURATION: 100 % | DIASTOLIC BLOOD PRESSURE: 61 MMHG

## 2021-11-05 LAB
ANION GAP SERPL CALC-SCNC: 11 MMOL/L — SIGNIFICANT CHANGE UP (ref 7–14)
APPEARANCE UR: CLEAR — SIGNIFICANT CHANGE UP
BACTERIA # UR AUTO: ABNORMAL
BASOPHILS # BLD AUTO: 0.09 K/UL — SIGNIFICANT CHANGE UP (ref 0–0.2)
BASOPHILS NFR BLD AUTO: 1 % — SIGNIFICANT CHANGE UP (ref 0–2)
BILIRUB UR-MCNC: NEGATIVE — SIGNIFICANT CHANGE UP
BUN SERPL-MCNC: 24 MG/DL — HIGH (ref 7–23)
CALCIUM SERPL-MCNC: 9.3 MG/DL — SIGNIFICANT CHANGE UP (ref 8.4–10.5)
CHLORIDE SERPL-SCNC: 102 MMOL/L — SIGNIFICANT CHANGE UP (ref 98–107)
CO2 SERPL-SCNC: 25 MMOL/L — SIGNIFICANT CHANGE UP (ref 22–31)
COLOR SPEC: YELLOW — SIGNIFICANT CHANGE UP
CREAT SERPL-MCNC: 0.93 MG/DL — SIGNIFICANT CHANGE UP (ref 0.5–1.3)
DIFF PNL FLD: NEGATIVE — SIGNIFICANT CHANGE UP
EOSINOPHIL # BLD AUTO: 1.35 K/UL — HIGH (ref 0–0.5)
EOSINOPHIL NFR BLD AUTO: 15.1 % — HIGH (ref 0–6)
EPI CELLS # UR: 5 /HPF — SIGNIFICANT CHANGE UP (ref 0–5)
GLUCOSE SERPL-MCNC: 99 MG/DL — SIGNIFICANT CHANGE UP (ref 70–99)
GLUCOSE UR QL: NEGATIVE — SIGNIFICANT CHANGE UP
HCT VFR BLD CALC: 42.4 % — SIGNIFICANT CHANGE UP (ref 34.5–45)
HGB BLD-MCNC: 13.4 G/DL — SIGNIFICANT CHANGE UP (ref 11.5–15.5)
HYALINE CASTS # UR AUTO: 3 /LPF — SIGNIFICANT CHANGE UP (ref 0–7)
IANC: 4.64 K/UL — SIGNIFICANT CHANGE UP (ref 1.5–8.5)
IMM GRANULOCYTES NFR BLD AUTO: 0.6 % — SIGNIFICANT CHANGE UP (ref 0–1.5)
KETONES UR-MCNC: NEGATIVE — SIGNIFICANT CHANGE UP
LEUKOCYTE ESTERASE UR-ACNC: ABNORMAL
LYMPHOCYTES # BLD AUTO: 2.18 K/UL — SIGNIFICANT CHANGE UP (ref 1–3.3)
LYMPHOCYTES # BLD AUTO: 24.3 % — SIGNIFICANT CHANGE UP (ref 13–44)
MAGNESIUM SERPL-MCNC: 2.1 MG/DL — SIGNIFICANT CHANGE UP (ref 1.6–2.6)
MCHC RBC-ENTMCNC: 27.6 PG — SIGNIFICANT CHANGE UP (ref 27–34)
MCHC RBC-ENTMCNC: 31.6 GM/DL — LOW (ref 32–36)
MCV RBC AUTO: 87.2 FL — SIGNIFICANT CHANGE UP (ref 80–100)
MONOCYTES # BLD AUTO: 0.66 K/UL — SIGNIFICANT CHANGE UP (ref 0–0.9)
MONOCYTES NFR BLD AUTO: 7.4 % — SIGNIFICANT CHANGE UP (ref 2–14)
NEUTROPHILS # BLD AUTO: 4.64 K/UL — SIGNIFICANT CHANGE UP (ref 1.8–7.4)
NEUTROPHILS NFR BLD AUTO: 51.6 % — SIGNIFICANT CHANGE UP (ref 43–77)
NITRITE UR-MCNC: NEGATIVE — SIGNIFICANT CHANGE UP
NRBC # BLD: 0 /100 WBCS — SIGNIFICANT CHANGE UP
NRBC # FLD: 0 K/UL — SIGNIFICANT CHANGE UP
PH UR: 6 — SIGNIFICANT CHANGE UP (ref 5–8)
PLATELET # BLD AUTO: 271 K/UL — SIGNIFICANT CHANGE UP (ref 150–400)
POTASSIUM SERPL-MCNC: 3.8 MMOL/L — SIGNIFICANT CHANGE UP (ref 3.5–5.3)
POTASSIUM SERPL-SCNC: 3.8 MMOL/L — SIGNIFICANT CHANGE UP (ref 3.5–5.3)
PROT UR-MCNC: NEGATIVE — SIGNIFICANT CHANGE UP
RBC # BLD: 4.86 M/UL — SIGNIFICANT CHANGE UP (ref 3.8–5.2)
RBC # FLD: 14.1 % — SIGNIFICANT CHANGE UP (ref 10.3–14.5)
RBC CASTS # UR COMP ASSIST: 2 /HPF — SIGNIFICANT CHANGE UP (ref 0–4)
SODIUM SERPL-SCNC: 138 MMOL/L — SIGNIFICANT CHANGE UP (ref 135–145)
SP GR SPEC: 1.01 — SIGNIFICANT CHANGE UP (ref 1–1.05)
STRONGYLOIDES AB SER-ACNC: POSITIVE
UROBILINOGEN FLD QL: SIGNIFICANT CHANGE UP
WBC # BLD: 8.97 K/UL — SIGNIFICANT CHANGE UP (ref 3.8–10.5)
WBC # FLD AUTO: 8.97 K/UL — SIGNIFICANT CHANGE UP (ref 3.8–10.5)
WBC UR QL: 26 /HPF — HIGH (ref 0–5)

## 2021-11-05 RX ORDER — CEFUROXIME AXETIL 250 MG
1 TABLET ORAL
Qty: 0 | Refills: 0 | DISCHARGE
Start: 2021-11-05

## 2021-11-05 RX ORDER — AMLODIPINE BESYLATE 2.5 MG/1
1 TABLET ORAL
Qty: 30 | Refills: 0

## 2021-11-05 RX ORDER — CEFUROXIME AXETIL 250 MG
250 TABLET ORAL EVERY 12 HOURS
Refills: 0 | Status: DISCONTINUED | OUTPATIENT
Start: 2021-11-05 | End: 2021-11-05

## 2021-11-05 RX ORDER — NIFEDIPINE 30 MG
1 TABLET, EXTENDED RELEASE 24 HR ORAL
Qty: 30 | Refills: 0
Start: 2021-11-05 | End: 2021-12-04

## 2021-11-05 RX ORDER — SENNA PLUS 8.6 MG/1
2 TABLET ORAL
Qty: 60 | Refills: 0
Start: 2021-11-05 | End: 2021-12-04

## 2021-11-05 RX ORDER — POLYETHYLENE GLYCOL 3350 17 G/17G
17 POWDER, FOR SOLUTION ORAL
Qty: 1020 | Refills: 0
Start: 2021-11-05 | End: 2021-12-04

## 2021-11-05 RX ORDER — CEFUROXIME AXETIL 250 MG
1 TABLET ORAL
Qty: 6 | Refills: 0
Start: 2021-11-05 | End: 2021-11-07

## 2021-11-05 RX ORDER — ASPIRIN/CALCIUM CARB/MAGNESIUM 324 MG
1 TABLET ORAL
Qty: 30 | Refills: 0
Start: 2021-11-05 | End: 2021-12-04

## 2021-11-05 RX ORDER — POLYETHYLENE GLYCOL 3350 17 G/17G
17 POWDER, FOR SOLUTION ORAL
Qty: 0 | Refills: 0 | DISCHARGE
Start: 2021-11-05

## 2021-11-05 RX ORDER — CARVEDILOL PHOSPHATE 80 MG/1
1 CAPSULE, EXTENDED RELEASE ORAL
Qty: 60 | Refills: 0
Start: 2021-11-05 | End: 2021-12-04

## 2021-11-05 RX ORDER — HYDROCHLOROTHIAZIDE 25 MG
1 TABLET ORAL
Qty: 30 | Refills: 0
Start: 2021-11-05 | End: 2021-12-04

## 2021-11-05 RX ORDER — ASPIRIN/CALCIUM CARB/MAGNESIUM 324 MG
1 TABLET ORAL
Qty: 0 | Refills: 0 | DISCHARGE

## 2021-11-05 RX ORDER — NIFEDIPINE 30 MG
1 TABLET, EXTENDED RELEASE 24 HR ORAL
Qty: 0 | Refills: 0 | DISCHARGE
Start: 2021-11-05

## 2021-11-05 RX ORDER — SENNA PLUS 8.6 MG/1
2 TABLET ORAL
Qty: 0 | Refills: 0 | DISCHARGE
Start: 2021-11-05

## 2021-11-05 RX ORDER — BISOPROLOL FUMARATE 10 MG/1
1 TABLET, FILM COATED ORAL
Qty: 0 | Refills: 0 | DISCHARGE

## 2021-11-05 RX ORDER — CARVEDILOL PHOSPHATE 80 MG/1
1 CAPSULE, EXTENDED RELEASE ORAL
Qty: 0 | Refills: 0 | DISCHARGE
Start: 2021-11-05

## 2021-11-05 RX ADMIN — Medication 30 MILLIGRAM(S): at 05:14

## 2021-11-05 RX ADMIN — Medication 25 MILLIGRAM(S): at 13:43

## 2021-11-05 RX ADMIN — LOSARTAN POTASSIUM 100 MILLIGRAM(S): 100 TABLET, FILM COATED ORAL at 05:13

## 2021-11-05 RX ADMIN — CARVEDILOL PHOSPHATE 3.12 MILLIGRAM(S): 80 CAPSULE, EXTENDED RELEASE ORAL at 05:14

## 2021-11-05 RX ADMIN — Medication 25 MILLIGRAM(S): at 05:14

## 2021-11-05 RX ADMIN — Medication 250 MILLIGRAM(S): at 13:43

## 2021-11-05 NOTE — DISCHARGE NOTE PROVIDER - NSDCFUADDAPPT_GEN_ALL_CORE_FT
Follow up with Dr. Neff 11/15 at 1030 AM  [phone]  Follow up with Dr. Reese from Infectious Disease. Call her office to schedule an appointment to follow up on lab values.

## 2021-11-05 NOTE — DISCHARGE NOTE NURSING/CASE MANAGEMENT/SOCIAL WORK - NSDCVIVACCINE_GEN_ALL_CORE_FT
influenza, injectable, quadrivalent, preservative free; 06-Oct-2016 14:31; Francie Herbert (RN); [employer]; [id number]; IntraMuscular; Deltoid Left.; 0.5 milliLiter(s); VIS (VIS Published: 07-Aug-2015, VIS Presented: 06-Oct-2016);

## 2021-11-05 NOTE — PROGRESS NOTE ADULT - SUBJECTIVE AND OBJECTIVE BOX
Patient is a 71y old  Female who presents with a chief complaint of chest pain (04 Nov 2021 17:33)      SUBJECTIVE / OVERNIGHT EVENTS:    Events noted.  CONSTITUTIONAL: No fever,  or fatigue  RESPIRATORY: No cough, wheezing,  No shortness of breath  CARDIOVASCULAR: No chest pain, palpitations, dizziness, or leg swelling  GASTROINTESTINAL: No abdominal or epigastric pain.   NEUROLOGICAL: No headaches,     MEDICATIONS  (STANDING):  atorvastatin 20 milliGRAM(s) Oral at bedtime  carvedilol 3.125 milliGRAM(s) Oral every 12 hours  donepezil 5 milliGRAM(s) Oral at bedtime  hydrALAZINE 25 milliGRAM(s) Oral three times a day  hydrochlorothiazide 25 milliGRAM(s) Oral daily  losartan 100 milliGRAM(s) Oral daily  melatonin 3 milliGRAM(s) Oral at bedtime  NIFEdipine XL 30 milliGRAM(s) Oral daily  polyethylene glycol 3350 17 Gram(s) Oral two times a day  senna 2 Tablet(s) Oral at bedtime    MEDICATIONS  (PRN):  acetaminophen     Tablet .. 650 milliGRAM(s) Oral every 6 hours PRN Temp greater or equal to 38C (100.4F), Mild Pain (1 - 3)  aluminum hydroxide/magnesium hydroxide/simethicone Suspension 30 milliLiter(s) Oral every 6 hours PRN Dyspepsia  nitroglycerin     SubLingual 0.4 milliGRAM(s) SubLingual every 5 minutes PRN Chest Pain        CAPILLARY BLOOD GLUCOSE        I&O's Summary      T(C): 36.6 (11-04-21 @ 20:30), Max: 36.7 (11-04-21 @ 13:09)  HR: 68 (11-04-21 @ 20:30) (66 - 96)  BP: 140/67 (11-04-21 @ 20:30) (131/54 - 150/87)  RR: 18 (11-04-21 @ 20:30) (16 - 18)  SpO2: 96% (11-04-21 @ 20:30) (96% - 99%)    PHYSICAL EXAM:    NECK: Supple, No JVD  CHEST/LUNG: Clear to auscultation bilaterally; No wheezing.  HEART: Regular rate and rhythm; No murmurs, rubs, or gallops  ABDOMEN: Soft, Nontender, Nondistended; Bowel sounds present  EXTREMITIES:   No edema  NEUROLOGY: AAO       LABS:                        14.3   10.38 )-----------( 281      ( 03 Nov 2021 07:22 )             43.5     11-03    138  |  103  |  23  ----------------------------<  104<H>  4.0   |  25  |  0.76    Ca    10.0      03 Nov 2021 07:22  Phos  3.9     11-03  Mg     2.10     11-03              CAPILLARY BLOOD GLUCOSE            RADIOLOGY & ADDITIONAL TESTS:    Imaging Personally Reviewed:    Consultant(s) Notes Reviewed:      Care Discussed with Consultants/Other Providers:    Cheng Garnica MD, CMD, FACP    [id number] [id number]  Office Tel: [phone]  Cell: [phone]

## 2021-11-05 NOTE — PROGRESS NOTE ADULT - SUBJECTIVE AND OBJECTIVE BOX
Date of service: 11/05/21    chief complaint: chest pain     extended hpi: 71F with PMH of HTN, HLD, known LBBB, historically preserved LV function and no obs CAD by cath 9/2020 admitted with elevated BP and chest pain.    S: no chest pain or sob; ros otherwise negative.     Review of Systems:   Constitutional: [ ] fevers, [ ] chills.   Skin: [ ] dry skin. [ ] rashes.  Psychiatric: [ ] depression, [ ] anxiety.   Gastrointestinal: [ ] BRBPR, [ ] melena.   Neurological: [ ] confusion. [ ] seizures. [ ] shuffling gait.   Ears,Nose,Mouth and Throat: [ ] ear pain [ ] sore throat.   Eyes: [ ] diplopia.   Respiratory: [ ] hemoptysis. [ ] shortness of breath  Cardiovascular: See HPI above  Hematologic/Lymphatic: [ ] anemia. [ ] painful nodes. [ ] prolonged bleeding.   Genitourinary: [ ] hematuria. [ ] flank pain.   Endocrine: [ ] significant change in weight. [ ] intolerance to heat and cold.     Review of systems [x ] otherwise negative, [ ] otherwise unable to obtain    FH: no family history of sudden cardiac death in first degree relatives    SH: [ ] tobacco, [ ] alcohol, [ ] drugs    acetaminophen     Tablet .. 650 milliGRAM(s) Oral every 6 hours PRN  aluminum hydroxide/magnesium hydroxide/simethicone Suspension 30 milliLiter(s) Oral every 6 hours PRN  atorvastatin 20 milliGRAM(s) Oral at bedtime  carvedilol 3.125 milliGRAM(s) Oral every 12 hours  cefuroxime   Tablet 250 milliGRAM(s) Oral every 12 hours  donepezil 5 milliGRAM(s) Oral at bedtime  hydrALAZINE 25 milliGRAM(s) Oral three times a day  hydrochlorothiazide 25 milliGRAM(s) Oral daily  losartan 100 milliGRAM(s) Oral daily  melatonin 3 milliGRAM(s) Oral at bedtime  NIFEdipine XL 30 milliGRAM(s) Oral daily  nitroglycerin     SubLingual 0.4 milliGRAM(s) SubLingual every 5 minutes PRN  polyethylene glycol 3350 17 Gram(s) Oral two times a day  senna 2 Tablet(s) Oral at bedtime                            13.4   8.97  )-----------( 271      ( 05 Nov 2021 07:35 )             42.4       11-05    138  |  102  |  24<H>  ----------------------------<  99  3.8   |  25  |  0.93    Ca    9.3      05 Nov 2021 07:35  Mg     2.10     11-05              T(C): 36.7 (11-05-21 @ 09:54), Max: 36.7 (11-04-21 @ 17:12)  HR: 67 (11-05-21 @ 09:54) (65 - 80)  BP: 111/54 (11-05-21 @ 09:54) (111/54 - 140/67)  RR: 18 (11-05-21 @ 09:54) (18 - 18)  SpO2: 98% (11-05-21 @ 09:54) (96% - 98%)  Wt(kg): --    I&O's Summary      General: Well nourished in no acute distress. Alert and Oriented * 3.   Head: Normocephalic and atraumatic.   Neck: No JVD. No bruits. Supple. Does not appear to be enlarged.   Cardiovascular: + S1,S2 ; RRR Soft systolic murmur at the left lower sternal border. No rubs noted.    Lungs: CTA b/l. No rhonchi, rales or wheezes.   Abdomen: + BS, soft. Non tender. Non distended. No rebound. No guarding.   Extremities: No clubbing/cyanosis/edema.   Neurologic: Moves all four extremities. Full range of motion.   Skin: Warm and moist. The patient's skin has normal elasticity and good skin turgor.   Psychiatric: Appropriate mood and affect.  Musculoskeletal: Normal range of motion, normal strength      Tele: SR 70s    < from: Transthoracic Echocardiogram (11.02.21 @ 08:20) >  CONCLUSIONS:  1. Mitral annular calcification, otherwise normal mitral  valve.  2. Normal left ventricular internal dimensions and wall  thicknesses.  3. Moderate global left ventricular systolic dysfunction.  Septal motion is consistent with LBBB.  4. Normal right ventricular size and function.  ------------------------------------------------------------------------  Confirmed on  11/2/2021 - 16:33:07 by Sydnee Bennett M.D. RPVI    < end of copied text >      < from: Nuclear Stress Test-Pharmacologic (11.02.21 @ 10:03) >  GATED ANALYSIS:  Post-stress gated wall motion analysis was performed (LVEF  >= 37 %;LVEDV = 131 ml.), revealing mild to moderate  global hypokinesis, with paradoxical motion of the septal  wall. RV function appeared normal.  ------------------------------------------------------------------------  IMPRESSIONS:Abnormal Study  * Myocardial Perfusion SPECT results are abnormal.  * Review of raw data shows: The study is of good technical  quality.  * There is a small, mild defect in basal inferior wall, no  longer significant withprone imaging.  * No clear evidence of ischemia or infarct.  * However, findings suggest non-ischemic cardiomyopathy.  The left ventricle was mildly dilated at baseline.  Post-stress gated wall motion analysis was performed (LVEF  >= 37 %;LVEDV = 131ml.), revealing mild to moderate  global hypokinesis, with paradoxical motion of the septal  wall. RV function appeared normal.  ------------------------------------------------------------------------  Confirmed on  11/2/2021 - 15:26:54 by Gilberto Fernandez M.D.    < end of copied text >      < from: Cardiac Cath Lab - Adult (09.08.20 @ 14:28) >  CORONARY VESSELS: The coronary circulation is right dominant.  LM:   --  LM: Normal.  LAD:   --  Proximal LAD: Normal.  --  Mid LAD: Angiography showed minor luminal irregularities with no flow  limiting lesions.  --  Distal LAD: Angiography showed minor luminal irregularities with no  flow limiting lesions.  --  D1: Normal.  --  D2: Normal.  CX:   --  Circumflex: Angiography showed minor luminal irregularities with  no flow limiting lesions.  --  OM1: Angiography showed minor luminal irregularities with no flow  limiting lesions.  RCA:   --  RCA: Normal.  --  RPDA: Normal.  --  RPLS: Normal.  COMPLICATIONS: There were no complications.  DIAGNOSTIC RECOMMENDATIONS: Medical management and risk factor modification  is recommended.  Prepared and signed by  Jc Razo M.D.  Signed 09/08/2020 15:01:53    < end of copied text >      A/P: 71F with PMH of HTN, HLD, known LBBB, historically preserved LV function and no obs CAD by cath 9/2020 admitted with elevated BP and chest pain.    -pt. chest pain free  -TTE and NST noted above, d/w patient and grandson  -Pt with known NICM, cont Medical management recommended at this time  -pt. with 14 beats NSVT on tele yesterday - continue with beta blockers and keep K > 4, Mg > 2 - no further EP workup needed per EP   -ID workup appreciated   -pt. reported ? foul smelling urine yesterday and today - UA noted - symptoms resolved - on oral antibiotics per ID   -further workup pending above  -DC home today    -f/u with Dr Neff 11/15 at 1030 AM  [phone]    Jc Razo MD

## 2021-11-05 NOTE — DISCHARGE NOTE NURSING/CASE MANAGEMENT/SOCIAL WORK - NSDCFUADDAPPT_GEN_ALL_CORE_FT
Follow up with Dr. Neff 11/15 at 1030 AM  [phone]  Follow up with Dr. Reese from Infectious Disease. Call her office to schedule an appointment to follow up on lab values.

## 2021-11-05 NOTE — DISCHARGE NOTE PROVIDER - HOSPITAL COURSE
70 y/o Female, with a PmHx of HTN, bradycardia, LBBB, who presents to the [facility] ED with "elevated blood pressure." States that over the past week, her pressure has been persistently high. Last check, her SBP was 199. She could not recall her DBP. What prompted her to come was an acute onset of substernal chest pain, that radiated to her back. Episode lasted "not too long."   Pain was not severe as per pt. She reports compliance w/ meds. Pain went away on its own. She denied any aggravating factors. No chest pain currently. However, she attributes the death of her sister recently as an inciting event for her troubles with her BP. Pt also reports following with cardio where they were monitoring her for a pacemaker. She has since returned the device to the office. She does not know the results. Otherwise, she denies any recent travels, sick contacts, f/c, NV, abd pain, diarrhea, dysuria. Admitted to telemetry.    On admission:    1. Chest pain  hsTrop: 20-->17            TSH: 6.25  10/30 CXR -  No pulmonary vascular congestion or pleural effusions. Rounded opacity within the right lower lung appears overall stable compared to prior study. Recommend correlation with recent CT chest and continued follow-up.  11/2 Echo - EF 41%, Mitral annular calcification, otherwise normal mitral  valve. Normal left ventricular internal dimensions and wall  thicknesses. Moderate global left ventricular systolic dysfunction.  Septal motion is consistent with LBBB. Normal right ventricular size and function.  11/2 Stress Test - Myocardial Perfusion SPECT results are abnormal.  Review of raw data shows: The study is of good technical quality.  There is a small, mild defect in basal inferior wall, no longer significant with prone imaging. No clear evidence of ischemia or infarct. However, findings suggest non-ischemic cardiomyopathy. The left ventricle was mildly dilated at baseline. Post-stress gated wall motion analysis was performed (LVEF >= 37 %;LVEDV = 131 ml.), revealing mild to moderate global hypokinesis, with paradoxical motion of the septal  wall. RV function appeared normal.    2. Bradycardia  - HR down to 36, now resolved  - Reviewed pt's med list. Pharmacist med rec also noted. Pt shown to be on amlodipine and Zetia. However, pt did not have that with her collection of meds. She indicated she only takes what she had on hand.  - Hold bisoprolol for now.  - Continue to monitor on telemetry.  - Ensure lytes are repleted - K>4, Mg>2.  11/1 Renal Artery doppler - no evidence of stenosis    3. HTN  - BP improved since coming in  - Resume all home meds  - Cont hydralazine, candesartan.  - Re-evaluate if pt is on amlodipine.   - Holding bisoprolol.    4. HLD  - Cont statin    6. Leukocytosis  WBC: 12.09  - ID c/s Dr. Reese following  - outpatient follow up    7. Multiple beats of NSVT on Telemetry  - EP c/s Dr. Lance was following  - medications were adjusted  - outpatient follow up    8. UTI  UA - large leuks, neg nitrites  - ID c/s Reese following    Pt comfortable at this time is now medically cleared for discharge home as per Dr. Razo. Outpatient follow up.    Reviewed discharge medications with patient; All new medications requiring new prescription sent to pharmacy of patients choice. Reviewed need for prescription from previous home medications and new prescriptions sent if requested. Patient in agreement and understands. 72 y/o Female, with a PmHx of HTN, bradycardia, LBBB, who [facility] with "elevated blood pressure." States that over the past week, her pressure has been persistently high. Last check, her SBP was 199. She could not recall her DBP. What prompted her to come was an acute onset of substernal chest pain, that radiated to her back. Episode lasted "not too long."   Pain was not severe as per pt. She reports compliance w/ meds. Pain went away on its own. She denied any aggravating factors. No chest pain currently. However, she attributes the death of her sister recently as an inciting event for her troubles with her BP. Pt also reports following with cardio where they were monitoring her for a pacemaker. She has since returned the device to the office. She does not know the results. Otherwise, she denies any recent travels, sick contacts, f/c, NV, abd pain, diarrhea, dysuria. Admitted to telemetry.    On admission:    1. Chest pain  hsTrop: 20-->17            TSH: 6.25  10/30 CXR -  No pulmonary vascular congestion or pleural effusions. Rounded opacity within the right lower lung appears overall stable compared to prior study. Recommend correlation with recent CT chest and continued follow-up.  11/2 Echo - EF 41%, Mitral annular calcification, otherwise normal mitral  valve. Normal left ventricular internal dimensions and wall  thicknesses. Moderate global left ventricular systolic dysfunction.  Septal motion is consistent with LBBB. Normal right ventricular size and function.  11/2 Stress Test - Myocardial Perfusion SPECT results are abnormal.  Review of raw data shows: The study is of good technical quality.  There is a small, mild defect in basal inferior wall, no longer significant with prone imaging. No clear evidence of ischemia or infarct. However, findings suggest non-ischemic cardiomyopathy. The left ventricle was mildly dilated at baseline. Post-stress gated wall motion analysis was performed (LVEF >= 37 %;LVEDV = 131 ml.), revealing mild to moderate global hypokinesis, with paradoxical motion of the septal  wall. RV function appeared normal.    2. Bradycardia  - HR down to 36, now resolved  - Reviewed pt's med list. Pharmacist med rec also noted. Pt shown to be on amlodipine and Zetia. However, pt did not have that with her collection of meds. She indicated she only takes what she had on hand.  - Hold bisoprolol for now.  - Continue to monitor on telemetry.  - Ensure lytes are repleted - K>4, Mg>2.  11/1 Renal Artery doppler - no evidence of stenosis    3. HTN  - BP improved since coming in  - Resume all home meds  - Cont hydralazine, candesartan.  - Re-evaluate if pt is on amlodipine.   - Holding bisoprolol.    4. HLD  - Cont statin    6. Leukocytosis  WBC: 12.09  - ID c/s Dr. Reese following  - outpatient follow up    7. Multiple beats of NSVT on Telemetry  - EP c/s Dr. Lance was following  - medications were adjusted  - outpatient follow up    8. UTI  UA - large leuks, neg nitrites  - ID c/s Reese following    Pt comfortable at this time is now medically cleared for discharge home as per Dr. Razo. Outpatient follow up.    Reviewed discharge medications with patient; All new medications requiring new prescription sent to pharmacy of patients choice. Reviewed need for prescription from previous home medications and new prescriptions sent if requested. Patient in agreement and understands.

## 2021-11-05 NOTE — PROGRESS NOTE ADULT - SUBJECTIVE AND OBJECTIVE BOX
EP ATTENDING    she denies palpitations, syncope, nor angina, ROS otherwise -    DATE OF SERVICE - 11-05-21    Review of Systems:   Constitutional: [ ] fevers, [ ] chills.   Skin: [ ] dry skin. [ ] rashes.  Psychiatric: [ ] depression, [ ] anxiety.   Gastrointestinal: [ ] BRBPR, [ ] melena.   Neurological: [ ] confusion. [ ] seizures. [ ] shuffling gait.   Ears,Nose,Mouth and Throat: [ ] ear pain [ ] sore throat.   Eyes: [ ] diplopia.   Respiratory: [ ] hemoptysis. [ ] shortness of breath  Cardiovascular: See HPI above  Hematologic/Lymphatic: [ ] anemia. [ ] painful nodes. [ ] prolonged bleeding.   Genitourinary: [ ] hematuria. [ ] flank pain.   Endocrine: [ ] significant change in weight. [ ] intolerance to heat and cold.     Review of systems [ x] otherwise negative, [ ] otherwise unable to obtain    FH: no family history of sudden cardiac death in first degree relatives    SH: [ ] tobacco, [ ] alcohol, [ ] drugs    acetaminophen     Tablet .. 650 milliGRAM(s) Oral every 6 hours PRN  aluminum hydroxide/magnesium hydroxide/simethicone Suspension 30 milliLiter(s) Oral every 6 hours PRN  atorvastatin 20 milliGRAM(s) Oral at bedtime  carvedilol 3.125 milliGRAM(s) Oral every 12 hours  cefuroxime   Tablet 250 milliGRAM(s) Oral every 12 hours  donepezil 5 milliGRAM(s) Oral at bedtime  hydrALAZINE 25 milliGRAM(s) Oral three times a day  hydrochlorothiazide 25 milliGRAM(s) Oral daily  losartan 100 milliGRAM(s) Oral daily  melatonin 3 milliGRAM(s) Oral at bedtime  NIFEdipine XL 30 milliGRAM(s) Oral daily  nitroglycerin     SubLingual 0.4 milliGRAM(s) SubLingual every 5 minutes PRN  polyethylene glycol 3350 17 Gram(s) Oral two times a day  senna 2 Tablet(s) Oral at bedtime                            13.4   8.97  )-----------( 271      ( 05 Nov 2021 07:35 )             42.4       11-05    138  |  102  |  24<H>  ----------------------------<  99  3.8   |  25  |  0.93    Ca    9.3      05 Nov 2021 07:35  Mg     2.10     11-05              T(C): 36.7 (11-05-21 @ 09:54), Max: 36.7 (11-04-21 @ 17:12)  HR: 67 (11-05-21 @ 09:54) (65 - 80)  BP: 111/54 (11-05-21 @ 09:54) (111/54 - 140/67)  RR: 18 (11-05-21 @ 09:54) (18 - 18)  SpO2: 98% (11-05-21 @ 09:54) (96% - 98%)  Wt(kg): --    I&O's Summary      General: Well nourished in no acute distress. Alert and Oriented * 3.   Head: Normocephalic and atraumatic.   Neck: No JVD. No bruits. Supple. Does not appear to be enlarged.   Cardiovascular: + S1,S2 ; RRR Soft systolic murmur at the left lower sternal border. No rubs noted.    Lungs: CTA b/l. No rhonchi, rales or wheezes.   Abdomen: + BS, soft. Non tender. Non distended. No rebound. No guarding.   Extremities: No clubbing/cyanosis/edema.   Neurologic: Moves all four extremities. Full range of motion.   Skin: Warm and moist. The patient's skin has normal elasticity and good skin turgor.   Psychiatric: Appropriate mood and affect.  Musculoskeletal: Normal range of motion, normal strength      TTE: moderate global LV dysfunction  NST: no ischemia, no infarct, findings consistent with a non-ischemic cardiomyopathy      A/P) 72 y/o female PMH HTN, hyperlipidemia, moderate LV dysfunction from a non-ischemic cardiomyopathy (non-obstructive CAD on cath 9/2020) a/w hypertensive urgency. EP called for periods of asymptomatic sinus bradycardia.    -medical therapy for moderate LV dysfunction from a non-ischemic cardiomyopathy as per cardiology  -would not recommend a pacemaker at this time as she's asymptomatic and clearly has chronotropic competence on telemetry  -no further inpatient EP workup expected  -remainder of care as per medicine and cardiology  -continue coreg for NSVT  -if her LVEF ever becomes < 35% the next step would be a BiV-ICD      Haim Lance M.D., [facility]  Cardiac Electrophysiology  [facility]  [address] [facility], [address] [place] [address]  [email or web address]    office [phone]  pager [phone]

## 2021-11-05 NOTE — PROGRESS NOTE ADULT - REASON FOR ADMISSION
chest pain

## 2021-11-05 NOTE — DISCHARGE NOTE PROVIDER - NSDCMRMEDTOKEN_GEN_ALL_CORE_FT
Aspirin Enteric Coated 81 mg oral delayed release tablet: 1 tab(s) orally once a day  atorvastatin 20 mg oral tablet: 1 tab(s) orally once a day  candesartan 32 mg oral tablet: 1 tab(s) orally once a day  carvedilol 3.125 mg oral tablet: 1 tab(s) orally every 12 hours  cefuroxime 250 mg oral tablet: 1 tab(s) orally every 12 hours x 3 days  donepezil 5 mg oral tablet: 1 tab(s) orally once a day (at bedtime)  ezetimibe 10 mg oral tablet: 1 tab(s) orally once a day  hydrALAZINE 25 mg oral tablet: 1 tab(s) orally 3 times a day  hydroCHLOROthiazide 25 mg oral tablet: 1 tab(s) orally once a day  Melatonin 3 mg oral tablet: 1 tab(s) orally once a day (at bedtime)  NIFEdipine 30 mg oral tablet, extended release: 1 tab(s) orally once a day  polyethylene glycol 3350 oral powder for reconstitution: 17 gram(s) orally 2 times a day  senna oral tablet: 2 tab(s) orally once a day (at bedtime)

## 2021-11-05 NOTE — PROGRESS NOTE ADULT - SUBJECTIVE AND OBJECTIVE BOX
Infectious Diseases progress note:    Subjective:  Events noted.  Pt reported to have some urinary discomfort earlier.  UA (+) LE.  Started on ceftin x 3 days.  No new somatic complaints.      ROS:  CONSTITUTIONAL:  No fever, chills, rigors  CARDIOVASCULAR:  No chest pain or palpitations  RESPIRATORY:   No SOB, cough, dyspnea on exertion.  No wheezing  GASTROINTESTINAL:  No abd pain, N/V, diarrhea/constipation  EXTREMITIES:  No swelling or joint pain  GENITOURINARY:  No burning on urination, increased frequency or urgency.  No flank pain  NEUROLOGIC:  No HA, visual disturbances  SKIN: No rashes    Allergies    No Known Allergies    Intolerances        ANTIBIOTICS/RELEVANT:  antimicrobials  cefuroxime   Tablet 250 milliGRAM(s) Oral every 12 hours    immunologic:    OTHER:  acetaminophen     Tablet .. 650 milliGRAM(s) Oral every 6 hours PRN  aluminum hydroxide/magnesium hydroxide/simethicone Suspension 30 milliLiter(s) Oral every 6 hours PRN  atorvastatin 20 milliGRAM(s) Oral at bedtime  carvedilol 3.125 milliGRAM(s) Oral every 12 hours  donepezil 5 milliGRAM(s) Oral at bedtime  hydrALAZINE 25 milliGRAM(s) Oral three times a day  hydrochlorothiazide 25 milliGRAM(s) Oral daily  losartan 100 milliGRAM(s) Oral daily  melatonin 3 milliGRAM(s) Oral at bedtime  NIFEdipine XL 30 milliGRAM(s) Oral daily  nitroglycerin     SubLingual 0.4 milliGRAM(s) SubLingual every 5 minutes PRN  polyethylene glycol 3350 17 Gram(s) Oral two times a day  senna 2 Tablet(s) Oral at bedtime      Objective:  Vital Signs Last 24 Hrs  T(C): 36.8 (2021 13:45), Max: 36.8 (2021 13:45)  T(F): 98.2 (2021 13:45), Max: 98.2 (2021 13:45)  HR: 70 (2021 13:45) (65 - 70)  BP: 127/61 (2021 13:45) (111/54 - 127/61)  BP(mean): --  RR: 18 (2021 13:45) (18 - 18)  SpO2: 100% (2021 13:45) (98% - 100%)    PHYSICAL EXAM:  Constitutional:NAD  Eyes:AYLEEN, EOMI  Ear/Nose/Throat: no thrush, mucositis.  Moist mucous membranes	  Neck:no JVD, no lymphadenopathy, supple  Respiratory: CTA brady  Cardiovascular: S1S2 RRR, no murmurs  Gastrointestinal:soft, nontender,  nondistended (+) BS  Extremities:no e/e/c  Skin:  no rashes, open wounds or ulcerations        LABS:                        13.4   8.97  )-----------( 271      ( 2021 07:35 )             42.4     11    138  |  102  |  24<H>  ----------------------------<  99  3.8   |  25  |  0.93    Ca    9.3      2021 07:35  Mg     2.10     11-        Urinalysis Basic - ( 2021 09:07 )    Color: Yellow / Appearance: Clear / S.014 / pH: x  Gluc: x / Ketone: Negative  / Bili: Negative / Urobili: <2 mg/dL   Blood: x / Protein: Negative / Nitrite: Negative   Leuk Esterase: Large / RBC: 2 /HPF / WBC 26 /HPF   Sq Epi: x / Non Sq Epi: 5 /HPF / Bacteria: Few        MICROBIOLOGY:          RADIOLOGY & ADDITIONAL STUDIES:    < from: Xray Chest 2 Views PA/Lat (10.30.21 @ 15:04) >  IMPRESSION:    No pulmonary vascular congestion or pleural effusions.    Rounded opacity within the right lower lung appears overall stable compared to prior study. Recommend correlation with recent CT chest and continued follow-up.    < end of copied text >

## 2021-11-05 NOTE — DISCHARGE NOTE PROVIDER - CARE PROVIDER_API CALL
Reynaldo Neff)  Cardiology  [address] [facility], [address]  [place] [address]  Phone: [phone]  Fax: [phone]  Follow Up Time:     Domitila Reese)  Infectious Disease; Internal Medicine  [address] [facility], [address]  [place] [address]  Phone: [phone]  Fax: [phone]  Follow Up Time:

## 2021-11-05 NOTE — DISCHARGE NOTE PROVIDER - NSDCCPCAREPLAN_GEN_ALL_CORE_FT
PRINCIPAL DISCHARGE DIAGNOSIS  Diagnosis: Chest pain  Assessment and Plan of Treatment: To be asymptomatic, to reduce risks factors such as hypertension, diabetes and hyperlipidemia to lower the risk of blood clots formation; and to prevent complications of coronary artery disease such as worsening chest pain, heart attack and death.   Continue aspirin, do not stop unless instructed by your physician.  Continue low salt, fat, cholesterol and carbohydrate diet. Follow up with cardiologist and primary care physician's routine appointment.      SECONDARY DISCHARGE DIAGNOSES  Diagnosis: HLD (hyperlipidemia)  Assessment and Plan of Treatment: To maintain normal cholesterol levels to prevent stroke, coronary artery disease, peripheral vascular disease and heart attacks.   Low fat diet, exercise daily and continue current medications. Follow up with primary care physician and cardiologist for management.    Diagnosis: Bradycardia  Assessment and Plan of Treatment: Found to have a slow heart rate. Now resolved. Follow up with your Cardiologist.    Diagnosis: HTN (hypertension)  Assessment and Plan of Treatment: To maintain a normal blood pressure to prevent heart attack, stroke and renal failure.   Low sodium and fat diet, continue anti-hypertensive medications, and follow up with primary care physician.    Diagnosis: Acute systolic congestive heart failure  Assessment and Plan of Treatment: To relieve and prevent worsening symptoms associated with congestive heart failure, to improve quality of life, and to treat underlying conditions such as coronary heart disease or high blood pressure,, and to maintain euvolemia.   Low salt diet, fluid restriction to 1500 ml daily, monitor your fluid intake and weight daily, exercise as tolerated 30 minutes daily, and follow up with your physician within 1 to 2 weeks.     PRINCIPAL DISCHARGE DIAGNOSIS  Diagnosis: Chest pain  Assessment and Plan of Treatment: To be asymptomatic, to reduce risks factors such as hypertension, diabetes and hyperlipidemia to lower the risk of blood clots formation; and to prevent complications of coronary artery disease such as worsening chest pain, heart attack and death.   Continue aspirin, do not stop unless instructed by your physician.  Continue low salt, fat, cholesterol and carbohydrate diet. Follow up with cardiologist and primary care physician's routine appointment.      SECONDARY DISCHARGE DIAGNOSES  Diagnosis: HLD (hyperlipidemia)  Assessment and Plan of Treatment: To maintain normal cholesterol levels to prevent stroke, coronary artery disease, peripheral vascular disease and heart attacks.   Low fat diet, exercise daily and continue current medications. Follow up with primary care physician and cardiologist for management.    Diagnosis: Bradycardia  Assessment and Plan of Treatment: Found to have a slow heart rate. Now resolved. Follow up with your Cardiologist.    Diagnosis: HTN (hypertension)  Assessment and Plan of Treatment: To maintain a normal blood pressure to prevent heart attack, stroke and renal failure.   Low sodium and fat diet, continue anti-hypertensive medications, and follow up with primary care physician.    Diagnosis: Acute systolic congestive heart failure  Assessment and Plan of Treatment: To relieve and prevent worsening symptoms associated with congestive heart failure, to improve quality of life, and to treat underlying conditions such as coronary heart disease or high blood pressure,, and to maintain euvolemia.   Low salt diet, fluid restriction to 1500 ml daily, monitor your fluid intake and weight daily, exercise as tolerated 30 minutes daily, and follow up with your physician within 1 to 2 weeks.    Diagnosis: Acute UTI  Assessment and Plan of Treatment: To be asymptomatic and to prevent reoccurrence. Continue antibiotic as prescribed.  Always wipe from front to back after using the bathroom. Never wipe twice with the same tissue. Take showers and avoid prolonged baths. Try to empty the bladder at least every 4 hours during the day while awake, even if the need or urge to void is absent.  Do not try to hold your urine until a more convenient time or place.  Drink plenty of water.

## 2021-11-05 NOTE — DISCHARGE NOTE NURSING/CASE MANAGEMENT/SOCIAL WORK - PATIENT PORTAL LINK FT
You can access the FollowMyHealth Patient Portal offered by [facility] by registering at the following website: [email or web address]. By joining [employer]’s FollowMyHealth portal, you will also be able to view your health information using other applications (apps) compatible with our system.

## 2021-11-05 NOTE — PROGRESS NOTE ADULT - ASSESSMENT
· Assessment	  71F with PMH of HTN, bradycardia, LBBB who presents to the [facility] the [facility] with elevated BP. Initial labs unremarkable. Troponin delta negative. Lower suspicion for ACS. Anginal sx could be related to stress given recent death of sister. However, pt also noted to bradycardic (which appears unchanged from prior). Admit for further cardiac workup.     Chest pain:    Cardio f/up appreciated.  Tele  ECHO: LV dysfunction  NST: Nonischemic cardiomyopathy    HTN:  Cw Losartan/Hydralazine/Nifedipine  BP stable    Leukocytosis/Eosinophilia:    ID f/up appreciated - work up as per ID.
· Assessment	  71F with PMH of HTN, bradycardia, LBBB who presents to the [facility] the \[facility]\"" with elevated BP. Initial labs unremarkable. Troponin delta negative. Lower suspicion for ACS. Anginal sx could be related to stress given recent death of sister. However, pt also noted to bradycardic (which appears unchanged from prior). Admit for further cardiac workup.     Chest pain:    Cardio f/up appreciated.  Tele  ECHO: LV dysfunction  NST: Nonischemic cardiomyopathy    HTN:  Cw Losartan/Hydralazine/Nifedipine  BP stable    Leukocytosis/Eosinophilia:    ID f/up appreciated - work up as per ID.
71F with PMH of HTN, bradycardia, LBBB who presents to the hospital with "elevated blood pressure." States that over the past week, her pressure has been persistently high. Last check, her SBP was 199. She could not recall her DBP. What prompted her to come was an acute onset of substernal chest pain, that radiated to her back. Episode lasted "not too long."   Pain was not severe as per pt. She reports compliance w/ meds. Pain went away on its own. She denied any aggravating factors. No chest pain currently. However, she attributes the death of her sister recently as an inciting event for her troubles with her BP. Pt also reports following with cardio where they were monitoring her for a pacemaker. She has since returned the device to the office. She does not know the results. Otherwise, she denies any recent travels, sick contacts, f/c, NV, abd pain, diarrhea, dysuria.     ED Course: 192/65, 40, 16, 97.3F, 99% RA. Received Hydralazine 50 PO and APAP 650. (30 Oct 2021 18:22)    HOSP COURSE:    Pt a/w evaluation of CP, symptomatic bradycardia and elevated BPs.  Renal artery dopplers showed no evidence of stenosis.  Echo with mod global L vent sys dysf.      WBC 9.8 --> 12.2.  ESR 18.  CRP 5.9.   Eos 12 - 16%.  Lyme titers neg. Covid pcr (-).  Cxr no pulmonary vascular congestion or pleural effusions.  Rounded opacity within the right lower lung appears overall stable compared to prior study.       ID consulted to evaluate elevated WBC.       Leukocytosis/eosinophilia:    - Wbc elevated to 12, no new localizing symptoms on clinical exam.  Pt denies HA, n/v, cough, cp, abd pain, diarrhea/dysuria, rash.      - Cont to monitor CBC with diff with next blood draw, if cont to rise, send UA/Ucx.  Will reassess need for further laboratory and diagnostic testing.      - Pt noted to have elevated peripheral eosinophils, send strongyloides ab, trichinella, and toxocara.  Check stool o&p.    - Nuclear stress stess results noted - s/o nonischemic CM.  Pt is originally from [facility], last travelled there 40 years ago.  Will check trypanosoma cruzii Abs    d/w Cardiology, no objection for d/c from ID standpoint.  ID f/u in 1-2 weeks to f/u parasitology w/u    d/w pt at bedside.     Domitila Reese  [phone]
· Assessment	  71F with PMH of HTN, bradycardia, LBBB who presents to the [facility] the [facility] with elevated BP. Initial labs unremarkable. Troponin delta negative. Lower suspicion for ACS. Anginal sx could be related to stress given recent death of sister. However, pt also noted to bradycardic (which appears unchanged from prior). Admit for further cardiac workup.     Chest pain:    Cardio f/up appreciated.  Tele  ECHO: LV dysfunction  NST: Nonischemic cardiomyopathy    HTN:  Cw Losartan/Hydralazine/Nifedipine  BP stable    Leukocytosis/Eosinophilia:    ID eval appreciated
71F with PMH of HTN, bradycardia, LBBB who presents to the hospital with "elevated blood pressure." States that over the past week, her pressure has been persistently high. Last check, her SBP was 199. She could not recall her DBP. What prompted her to come was an acute onset of substernal chest pain, that radiated to her back. Episode lasted "not too long."   Pain was not severe as per pt. She reports compliance w/ meds. Pain went away on its own. She denied any aggravating factors. No chest pain currently. However, she attributes the death of her sister recently as an inciting event for her troubles with her BP. Pt also reports following with cardio where they were monitoring her for a pacemaker. She has since returned the device to the office. She does not know the results. Otherwise, she denies any recent travels, sick contacts, f/c, NV, abd pain, diarrhea, dysuria.     ED Course: 192/65, 40, 16, 97.3F, 99% RA. Received Hydralazine 50 PO and APAP 650. (30 Oct 2021 18:22)    HOSP COURSE:    Pt a/w evaluation of CP, symptomatic bradycardia and elevated BPs.  Renal artery dopplers showed no evidence of stenosis.  Echo with mod global L vent sys dysf.      WBC 9.8 --> 12.2.  ESR 18.  CRP 5.9.   Eos 12 - 16%.  Lyme titers neg. Covid pcr (-).  Cxr no pulmonary vascular congestion or pleural effusions.  Rounded opacity within the right lower lung appears overall stable compared to prior study.       ID consulted to evaluate elevated WBC.       Leukocytosis/eosinophilia:    - Wbc elevated to 12, no new localizing symptoms on clinical exam.  Pt denies HA, n/v, cough, cp, abd pain, diarrhea/dysuria, rash.      - Cont to monitor CBC with diff with next blood draw, if cont to rise, send UA/Ucx.  Will reassess need for further laboratory and diagnostic testing.      - Pt noted to have elevated peripheral eosinophils, send strongyloides ab, trichinella, and toxocara.  Check stool o&p.    - Nuclear stress stess results noted - s/o nonischemic CM.  Pt is originally from [facility], last travelled there 40 years ago.  Will check trypanosoma cruzii Abs    d/w Cardiology    Will follow,    Domitila Reese  [phone]
· Assessment	  71F with PMH of HTN, bradycardia, LBBB who presents [facility] the [facility] with elevated BP. Initial labs unremarkable. Troponin delta negative. Lower suspicion for ACS. Anginal sx could be related to stress given recent death of sister. However, pt also noted to bradycardic (which appears unchanged from prior). Admit for further cardiac workup.     Chest pain:    Cardio f/up appreciated.  Tele  ECHO: LV dysfunction  NST: Nonischemic cardiomyopathy    HTN:  Cw Losartan/Hydralazine/Nifedipine  BP stable    UTI:    Po Ceftin    Strongyloides Ab (+).  On ivermectin    ID f/up appreciated - work up as per ID.
71F with PMH of HTN, bradycardia, LBBB who presents to the hospital with "elevated blood pressure." States that over the past week, her pressure has been persistently high. Last check, her SBP was 199. She could not recall her DBP. What prompted her to come was an acute onset of substernal chest pain, that radiated to her back. Episode lasted "not too long."   Pain was not severe as per pt. She reports compliance w/ meds. Pain went away on its own. She denied any aggravating factors. No chest pain currently. However, she attributes the death of her sister recently as an inciting event for her troubles with her BP. Pt also reports following with cardio where they were monitoring her for a pacemaker. She has since returned the device to the office. She does not know the results. Otherwise, she denies any recent travels, sick contacts, f/c, NV, abd pain, diarrhea, dysuria.     ED Course: 192/65, 40, 16, 97.3F, 99% RA. Received Hydralazine 50 PO and APAP 650. (30 Oct 2021 18:22)    HOSP COURSE:    Pt a/w evaluation of CP, symptomatic bradycardia and elevated BPs.  Renal artery dopplers showed no evidence of stenosis.  Echo with mod global L vent sys dysf.      WBC 9.8 --> 12.2.  ESR 18.  CRP 5.9.   Eos 12 - 16%.  Lyme titers neg. Covid pcr (-).  Cxr no pulmonary vascular congestion or pleural effusions.  Rounded opacity within the right lower lung appears overall stable compared to prior study.       ID consulted to evaluate elevated WBC.       Leukocytosis/eosinophilia:    - Wbc elevated to 12, no new localizing symptoms on clinical exam.  Pt denies HA, n/v, cough, cp, abd pain, diarrhea/dysuria, rash.      - Cont to monitor CBC with diff with next blood draw, if cont to rise, send UA/Ucx.  Will reassess need for further laboratory and diagnostic testing.      - Pt noted to have elevated peripheral eosinophils, send strongyloides ab, trichinella, and toxocara.  Check stool o&p.    - Nuclear stress stess results noted - s/o nonischemic CM.  Pt is originally from [facility], last travelled there 40 years ago.  Will check trypanosoma cruzii Abs    * Strongyloides Ab (+).  Start ivermectin 12mg po qdaily x 2 days  * UA (+) LE, pt with urinary discomfort.  Pt started on ceftin 250mg po bid x 3 days.     Domitila Reese  [phone]
EP ATTENDING    Agree with above. 70 y/o female PMH HTN, hyperlipidemia, moderate LV dysfunction from a non-ischemic cardiomyopathy (non-obstructive CAD on cath 9/2020) a/w hypertensive urgency. EP called for periods of asymptomatic sinus bradycardia.    -medical therapy for moderate LV dysfunction from a non-ischemic cardiomyopathy as per cardiology  -would not recommend a pacemaker at this time as she's asymptomatic and clearly has chronotropic competence on telemetry  -no further inpatient EP workup expected  -remainder of care as per medicine and cardiology  -consider restarting low dose coreg 3.125  -if her LVEF ever becomes < 35% the next step would be a BiV-ICD

## 2021-11-06 RX ORDER — IVERMECTIN 3 MG/1
6 TABLET ORAL
Qty: 6 | Refills: 0
Start: 2021-11-06

## 2021-11-09 LAB — T SPIRALIS AB SER-ACNC: NEGATIVE — SIGNIFICANT CHANGE UP

## 2021-11-11 LAB — T CANIS AB FLD-ACNC: NEGATIVE — SIGNIFICANT CHANGE UP

## 2021-12-16 ENCOUNTER — OUTPATIENT (OUTPATIENT)
Dept: OUTPATIENT SERVICES | Facility: HOSPITAL | Age: 71
LOS: 1 days | Discharge: HOME | End: 2021-12-16

## 2021-12-16 DIAGNOSIS — Z98.890 OTHER SPECIFIED POSTPROCEDURAL STATES: Chronic | ICD-10-CM

## 2021-12-16 DIAGNOSIS — D64.9 ANEMIA, UNSPECIFIED: ICD-10-CM

## 2021-12-17 ENCOUNTER — RESULT REVIEW (OUTPATIENT)
Age: 71
End: 2021-12-17

## 2021-12-17 ENCOUNTER — APPOINTMENT (OUTPATIENT)
Dept: HEMATOLOGY ONCOLOGY | Facility: CLINIC | Age: 71
End: 2021-12-17
Payer: MEDICARE

## 2021-12-17 VITALS
DIASTOLIC BLOOD PRESSURE: 74 MMHG | BODY MASS INDEX: 36.57 KG/M2 | WEIGHT: 201.28 LBS | RESPIRATION RATE: 17 BRPM | HEIGHT: 62.17 IN | SYSTOLIC BLOOD PRESSURE: 159 MMHG | OXYGEN SATURATION: 97 % | HEART RATE: 56 BPM | TEMPERATURE: 97.7 F

## 2021-12-17 PROBLEM — R91.8 PULMONARY NODULES: Status: ACTIVE | Noted: 2021-09-09

## 2021-12-17 LAB
ALBUMIN SERPL ELPH-MCNC: 3.9 G/DL
ALP BLD-CCNC: 114 U/L
ALT SERPL-CCNC: 17 U/L
ANION GAP SERPL CALC-SCNC: 12 MMOL/L
AST SERPL-CCNC: 17 U/L
BASOPHILS # BLD AUTO: 0.08 K/UL — SIGNIFICANT CHANGE UP (ref 0–0.2)
BASOPHILS NFR BLD AUTO: 1 % — SIGNIFICANT CHANGE UP (ref 0–2)
BILIRUB SERPL-MCNC: 0.5 MG/DL
BUN SERPL-MCNC: 13 MG/DL
CALCIUM SERPL-MCNC: 9.4 MG/DL
CHLORIDE SERPL-SCNC: 103 MMOL/L
CO2 SERPL-SCNC: 27 MMOL/L
CREAT SERPL-MCNC: 0.71 MG/DL
EOSINOPHIL # BLD AUTO: 0.44 K/UL — SIGNIFICANT CHANGE UP (ref 0–0.5)
EOSINOPHIL NFR BLD AUTO: 5.3 % — SIGNIFICANT CHANGE UP (ref 0–6)
GLUCOSE SERPL-MCNC: 131 MG/DL
HAPTOGLOB SERPL-MCNC: 178 MG/DL
HCT VFR BLD CALC: 38.2 % — SIGNIFICANT CHANGE UP (ref 34.5–45)
HGB BLD-MCNC: 11.9 G/DL — SIGNIFICANT CHANGE UP (ref 11.5–15.5)
IMM GRANULOCYTES NFR BLD AUTO: 0.4 % — SIGNIFICANT CHANGE UP (ref 0–1.5)
LDH SERPL-CCNC: 147 U/L
LYMPHOCYTES # BLD AUTO: 2.11 K/UL — SIGNIFICANT CHANGE UP (ref 1–3.3)
LYMPHOCYTES # BLD AUTO: 25.5 % — SIGNIFICANT CHANGE UP (ref 13–44)
MCHC RBC-ENTMCNC: 27.7 PG — SIGNIFICANT CHANGE UP (ref 27–34)
MCHC RBC-ENTMCNC: 31.2 G/DL — LOW (ref 32–36)
MCV RBC AUTO: 88.8 FL — SIGNIFICANT CHANGE UP (ref 80–100)
MONOCYTES # BLD AUTO: 0.45 K/UL — SIGNIFICANT CHANGE UP (ref 0–0.9)
MONOCYTES NFR BLD AUTO: 5.4 % — SIGNIFICANT CHANGE UP (ref 2–14)
NEUTROPHILS # BLD AUTO: 5.15 K/UL — SIGNIFICANT CHANGE UP (ref 1.8–7.4)
NEUTROPHILS NFR BLD AUTO: 62.4 % — SIGNIFICANT CHANGE UP (ref 43–77)
NRBC # BLD: 0 /100 WBCS — SIGNIFICANT CHANGE UP (ref 0–0)
PLATELET # BLD AUTO: 258 K/UL — SIGNIFICANT CHANGE UP (ref 150–400)
POTASSIUM SERPL-SCNC: 4 MMOL/L
PROT SERPL-MCNC: 6.8 G/DL
RBC # BLD: 4.3 M/UL — SIGNIFICANT CHANGE UP (ref 3.8–5.2)
RBC # FLD: 13.6 % — SIGNIFICANT CHANGE UP (ref 10.3–14.5)
RETICS #: 77.8 K/UL — SIGNIFICANT CHANGE UP (ref 25–125)
RETICS/RBC NFR: 1.8 % — SIGNIFICANT CHANGE UP (ref 0.5–2.5)
SODIUM SERPL-SCNC: 142 MMOL/L
WBC # BLD: 8.26 K/UL — SIGNIFICANT CHANGE UP (ref 3.8–10.5)
WBC # FLD AUTO: 8.26 K/UL — SIGNIFICANT CHANGE UP (ref 3.8–10.5)

## 2021-12-17 PROCEDURE — 99214 OFFICE O/P EST MOD 30 MIN: CPT

## 2021-12-17 NOTE — ASSESSMENT
[FreeTextEntry1] : 72yo F w/ warm AIHA, s/p prednisone \par Hgb 11.9 today, now tapered off steroids since Aug 2018. Doing well. \par f/u retic, LDH, hapto\par Repeat CT chest to monitor pulmonary nodules done on 9/29: 6 mm right lower lobe nodule adjacent to a vessel (series 2 image 76) is new from prior. Recommend follow-up chest CT in 3 months to determine stability. \par Plan to order CT chest today to f/u RLL nodule today, but pt said the PMD ordered CT chest and it was done on 12/7, result is pending. Advised pt to fax the result to Dr. Quan's office, Dr. Quan's business card gave to pt. She verbalized understanding and agreed. \par PMD f/u\par RTC 6 mo

## 2021-12-17 NOTE — HISTORY OF PRESENT ILLNESS
[de-identified] : 68yo F w/ HTN, CAD, GERD presented to the ED on 5/2/18 with 2 days of subjective fever and 3 months of left sided ear fullness and tinnitus. Blood work showed Hgb 6.3, further w/u consistent for hemolytic anemia - elevated retic count, elevated LDH, low hapto, indirect hyperbilirubinemia, direct Paolo positive for IgG. Negative stool occult. She was started on 90 mg prednisone on 5/3/18. CT was done which did not show any malignancy. \par \par She was discharged on prednisone 90mg. Hgb 7.0 on day of discharge, received 1u PRBCs prior to discharge. \par Her children live elsewhere around the country. She lives with her .  \par \shea Went to the ED on 5/30/18 for a vesicular rash in area of erythema on chest and back - diagnosed with zoster and started on Valtrex.  [de-identified] : She is now tapered off prednisone since Aug 2018. \par \par She was in the ED on 6/26/19 with nausea/diarrhea. She is here b/c she has "cancer in the lung". \par CT A/P: Mild pancolitis of infectious or inflammatory etiology. Partially imaged right middle lobe ground glass opacity not seen on CT of 5/4/18. Finding can be further evaluated with nonemergent chest CT. \par CT chest 7/10/19: minimal peripheral reticular opacities noted in the right middle lobe. Bilateral pulmonary nodules. \par \par She reports pains in her b/l legs with walking, occasional swelling as well. She reports itching and pins/needles diffusely. She has noted new white spots on both arms and legs. \par Otherwise doing well. \par \par Patient was hospitalized on 9/2/20 in [facility] due to chest pain for several days with new LBBB in setting of HTN urgency with SBP > 240. Initial concern for STEMI based on EKG but did not meet Sgarbossa criteria. Started on IV NTG for BP control and admitted to CCU. IV NTG was tapered off and po antihypertensives were started. Cardiac enzymes were negative. ECHO with normal LV function and plan is for nuclear stress test. Patient also with asymptomatic sinus bradycardia but appropriate HR response with activity. On home donazepil per med rec but not given here ? due to side effect of bradycardia. No evidence of confusion. EP recommending ambulatory EKG monitoring. \par She presented today for f/u, admitted she feels fine overall, she will fly to [place] on 11/7, she is not sure when to come back to [place]. \par \par Patient was seen today for f/u, denied any new complaints. \par She just got back from [facility] last Friday - she was there for 2 months and 15 days. \par \par CT chest done 9/27/21: 6 mm right lower lobe nodule adjacent to a vessel (series 2 image 76) is new from prior. Recommend follow-up chest CT in 3 months to determine stability\par Pt was admitted to [facility] on 10/30/21 due to chest pain, HTN and bradycardia. Pt will see cardiologist Dr. Juan Alberto [facility] (Tel ; Fax ) next Monday. \par Per pt her PMD ordered CT chest and was done last Tuesday 12/7. She dose not know the result yet.

## 2021-12-17 NOTE — REASON FOR VISIT
[Follow-Up Visit] : a follow-up visit for [[employer] ] : provided by [employer]   [FreeTextEntry2] : ANNA [Interpreters_IDNumber] : [id number] [Interpreters_FullName] : María [TWNoteComboBox1] : Indian

## 2022-03-09 ENCOUNTER — OUTPATIENT (OUTPATIENT)
Dept: OUTPATIENT SERVICES | Facility: HOSPITAL | Age: 72
LOS: 1 days | Discharge: HOME | End: 2022-03-09

## 2022-03-09 DIAGNOSIS — Z98.890 OTHER SPECIFIED POSTPROCEDURAL STATES: Chronic | ICD-10-CM

## 2022-03-09 DIAGNOSIS — D64.9 ANEMIA, UNSPECIFIED: ICD-10-CM

## 2022-03-10 ENCOUNTER — APPOINTMENT (OUTPATIENT)
Dept: HEMATOLOGY ONCOLOGY | Facility: CLINIC | Age: 72
End: 2022-03-10

## 2022-04-08 ENCOUNTER — OUTPATIENT (OUTPATIENT)
Dept: OUTPATIENT SERVICES | Facility: HOSPITAL | Age: 72
LOS: 1 days | Discharge: HOME | End: 2022-04-08

## 2022-04-08 DIAGNOSIS — Z98.890 OTHER SPECIFIED POSTPROCEDURAL STATES: Chronic | ICD-10-CM

## 2022-04-08 DIAGNOSIS — D64.9 ANEMIA, UNSPECIFIED: ICD-10-CM

## 2022-04-13 ENCOUNTER — RESULT REVIEW (OUTPATIENT)
Age: 72
End: 2022-04-13

## 2022-04-13 ENCOUNTER — APPOINTMENT (OUTPATIENT)
Dept: HEMATOLOGY ONCOLOGY | Facility: CLINIC | Age: 72
End: 2022-04-13

## 2022-04-13 ENCOUNTER — APPOINTMENT (OUTPATIENT)
Dept: HEMATOLOGY ONCOLOGY | Facility: CLINIC | Age: 72
End: 2022-04-13
Payer: MEDICARE

## 2022-04-13 VITALS
WEIGHT: 204.03 LBS | SYSTOLIC BLOOD PRESSURE: 163 MMHG | OXYGEN SATURATION: 99 % | HEART RATE: 75 BPM | RESPIRATION RATE: 18 BRPM | TEMPERATURE: 98.2 F | DIASTOLIC BLOOD PRESSURE: 70 MMHG | BODY MASS INDEX: 37.12 KG/M2

## 2022-04-13 LAB
BASOPHILS # BLD AUTO: 0.05 K/UL — SIGNIFICANT CHANGE UP (ref 0–0.2)
BASOPHILS NFR BLD AUTO: 0.7 % — SIGNIFICANT CHANGE UP (ref 0–2)
EOSINOPHIL # BLD AUTO: 0.31 K/UL — SIGNIFICANT CHANGE UP (ref 0–0.5)
EOSINOPHIL NFR BLD AUTO: 4.1 % — SIGNIFICANT CHANGE UP (ref 0–6)
HCT VFR BLD CALC: 36.1 % — SIGNIFICANT CHANGE UP (ref 34.5–45)
HGB BLD-MCNC: 11.6 G/DL — SIGNIFICANT CHANGE UP (ref 11.5–15.5)
IMM GRANULOCYTES NFR BLD AUTO: 0.4 % — SIGNIFICANT CHANGE UP (ref 0–1.5)
LYMPHOCYTES # BLD AUTO: 1.93 K/UL — SIGNIFICANT CHANGE UP (ref 1–3.3)
LYMPHOCYTES # BLD AUTO: 25.6 % — SIGNIFICANT CHANGE UP (ref 13–44)
MCHC RBC-ENTMCNC: 28.1 PG — SIGNIFICANT CHANGE UP (ref 27–34)
MCHC RBC-ENTMCNC: 32.1 G/DL — SIGNIFICANT CHANGE UP (ref 32–36)
MCV RBC AUTO: 87.4 FL — SIGNIFICANT CHANGE UP (ref 80–100)
MONOCYTES # BLD AUTO: 0.41 K/UL — SIGNIFICANT CHANGE UP (ref 0–0.9)
MONOCYTES NFR BLD AUTO: 5.4 % — SIGNIFICANT CHANGE UP (ref 2–14)
NEUTROPHILS # BLD AUTO: 4.82 K/UL — SIGNIFICANT CHANGE UP (ref 1.8–7.4)
NEUTROPHILS NFR BLD AUTO: 63.8 % — SIGNIFICANT CHANGE UP (ref 43–77)
NRBC # BLD: 0 /100 WBCS — SIGNIFICANT CHANGE UP (ref 0–0)
PLATELET # BLD AUTO: 274 K/UL — SIGNIFICANT CHANGE UP (ref 150–400)
RBC # BLD: 4.13 M/UL — SIGNIFICANT CHANGE UP (ref 3.8–5.2)
RBC # FLD: 13.2 % — SIGNIFICANT CHANGE UP (ref 10.3–14.5)
WBC # BLD: 7.55 K/UL — SIGNIFICANT CHANGE UP (ref 3.8–10.5)
WBC # FLD AUTO: 7.55 K/UL — SIGNIFICANT CHANGE UP (ref 3.8–10.5)

## 2022-04-13 PROCEDURE — 99215 OFFICE O/P EST HI 40 MIN: CPT

## 2022-04-15 LAB
ALBUMIN SERPL ELPH-MCNC: 4 G/DL
ALP BLD-CCNC: 116 U/L
ALT SERPL-CCNC: 18 U/L
ANION GAP SERPL CALC-SCNC: 11 MMOL/L
AST SERPL-CCNC: 21 U/L
BILIRUB SERPL-MCNC: 0.3 MG/DL
BUN SERPL-MCNC: 11 MG/DL
CALCIUM SERPL-MCNC: 9 MG/DL
CHLORIDE SERPL-SCNC: 105 MMOL/L
CO2 SERPL-SCNC: 26 MMOL/L
CREAT SERPL-MCNC: 0.73 MG/DL
EGFR: 87 ML/MIN/1.73M2
GLUCOSE SERPL-MCNC: 145 MG/DL
HAPTOGLOB SERPL-MCNC: 181 MG/DL
POTASSIUM SERPL-SCNC: 3.5 MMOL/L
PROT SERPL-MCNC: 6.8 G/DL
SODIUM SERPL-SCNC: 142 MMOL/L

## 2022-04-15 NOTE — ASSESSMENT
[FreeTextEntry1] : 72yo F w/ warm AIHA, s/p prednisone \par Hgb 11.6 today, now tapered off steroids since Aug 2018. f/u retic, LDH, hapto\par Pt states today that her PMD told her she had cancer and had her sign some paperwork. She does not have any further information but was very tearful today. She brought her daughter today to visit for support. I called Dr. Annemarie Taveras's office today at [phone] for further information. They, too, are unsure what pt is referring to as her CT chest was stable. She saw pulm as well. Office will fax imaging report and pulm consultation to me. I explained above to patient and daughter with .  \par RTC 6 mo

## 2022-04-15 NOTE — REASON FOR VISIT
[Follow-Up Visit] : a follow-up visit for [[employer] ] : provided by [employer]   [FreeTextEntry2] : ANNA [Interpreters_IDNumber] : [id number] [Interpreters_FullName] : Derrek [TWNoteComboBox1] : Niuean

## 2022-04-15 NOTE — HISTORY OF PRESENT ILLNESS
[de-identified] : 68yo F w/ HTN, CAD, GERD presented to the ED on 5/2/18 with 2 days of subjective fever and 3 months of left sided ear fullness and tinnitus. Blood work showed Hgb 6.3, further w/u consistent for hemolytic anemia - elevated retic count, elevated LDH, low hapto, indirect hyperbilirubinemia, direct Paolo positive for IgG. Negative stool occult. She was started on 90 mg prednisone on 5/3/18. CT was done which did not show any malignancy. \par \par She was discharged on prednisone 90mg. Hgb 7.0 on day of discharge, received 1u PRBCs prior to discharge. \par Her children live elsewhere around the country. She lives with her .  \par \shea Went to the ED on 5/30/18 for a vesicular rash in area of erythema on chest and back - diagnosed with zoster and started on Valtrex.  [de-identified] : She is now tapered off prednisone since Aug 2018. \par \par She was in the ED on 6/26/19 with nausea/diarrhea. She is here b/c she has "cancer in the lung". \par CT A/P: Mild pancolitis of infectious or inflammatory etiology. Partially imaged right middle lobe ground glass opacity not seen on CT of 5/4/18. Finding can be further evaluated with nonemergent chest CT. \par CT chest 7/10/19: minimal peripheral reticular opacities noted in the right middle lobe. Bilateral pulmonary nodules. \par \par She reports pains in her b/l legs with walking, occasional swelling as well. She reports itching and pins/needles diffusely. She has noted new white spots on both arms and legs. \par Otherwise doing well. \par \par Patient was hospitalized on 9/2/20 in [facility] due to chest pain for several days with new LBBB in setting of HTN urgency with SBP > 240. Initial concern for STEMI based on EKG but did not meet Sgarbossa criteria. Started on IV NTG for BP control and admitted to CCU. IV NTG was tapered off and po antihypertensives were started. Cardiac enzymes were negative. ECHO with normal LV function and plan is for nuclear stress test. Patient also with asymptomatic sinus bradycardia but appropriate HR response with activity. On home donazepil per med rec but not given here ? due to side effect of bradycardia. No evidence of confusion. EP recommending ambulatory EKG monitoring. \par She presented today for f/u, admitted she feels fine overall, she will fly to [place] on 11/7, she is not sure when to come back to [place]. \par \par CT chest done 9/27/21: 6 mm right lower lobe nodule adjacent to a vessel (series 2 image 76) is new from prior. Recommend follow-up chest CT in 3 months to determine stability\par Pt was admitted to [facility] on 10/30/21 due to chest pain, HTN and bradycardia. Pt will see cardiologist Dr. Juan Alberto [facility] (Tel ; Fax ) next Monday. \par Per pt her PMD ordered CT chest and was done last Tuesday 12/7. She dose not know the result yet. \par \par She states today that her PMD told her she had cancer and had her sign some paperwork. She does not have any further information but was very tearful today. She brought her daughter today to visit for support. \par PMD: Dr. Annemarie Taveras

## 2022-06-13 ENCOUNTER — OUTPATIENT (OUTPATIENT)
Dept: OUTPATIENT SERVICES | Facility: HOSPITAL | Age: 72
LOS: 1 days | Discharge: HOME | End: 2022-06-13

## 2022-06-13 DIAGNOSIS — D64.9 ANEMIA, UNSPECIFIED: ICD-10-CM

## 2022-06-13 DIAGNOSIS — Z98.890 OTHER SPECIFIED POSTPROCEDURAL STATES: Chronic | ICD-10-CM

## 2022-06-20 ENCOUNTER — APPOINTMENT (OUTPATIENT)
Dept: HEMATOLOGY ONCOLOGY | Facility: CLINIC | Age: 72
End: 2022-06-20

## 2022-08-18 NOTE — H&P ADULT - PROBLEM/PLAN-1
DISPLAY PLAN FREE TEXT
Detail Level: Simple
Plan: If patient needs a refill on Clindamycin phosphate 1% topical swab or Plexion 9.8 %-4.8% topical cleanser she will call our office.

## 2022-10-10 ENCOUNTER — OUTPATIENT (OUTPATIENT)
Dept: OUTPATIENT SERVICES | Facility: HOSPITAL | Age: 72
LOS: 1 days | Discharge: HOME | End: 2022-10-10

## 2022-10-10 DIAGNOSIS — D64.9 ANEMIA, UNSPECIFIED: ICD-10-CM

## 2022-10-10 DIAGNOSIS — Z98.890 OTHER SPECIFIED POSTPROCEDURAL STATES: Chronic | ICD-10-CM

## 2022-10-12 ENCOUNTER — APPOINTMENT (OUTPATIENT)
Dept: HEMATOLOGY ONCOLOGY | Facility: CLINIC | Age: 72
End: 2022-10-12

## 2022-10-12 ENCOUNTER — RESULT REVIEW (OUTPATIENT)
Age: 72
End: 2022-10-12

## 2022-10-12 VITALS
WEIGHT: 205.69 LBS | DIASTOLIC BLOOD PRESSURE: 62 MMHG | SYSTOLIC BLOOD PRESSURE: 125 MMHG | OXYGEN SATURATION: 98 % | RESPIRATION RATE: 18 BRPM | BODY MASS INDEX: 37.42 KG/M2 | HEART RATE: 52 BPM | TEMPERATURE: 97.3 F

## 2022-10-12 LAB
BASOPHILS # BLD AUTO: 0.05 K/UL — SIGNIFICANT CHANGE UP (ref 0–0.2)
BASOPHILS NFR BLD AUTO: 0.7 % — SIGNIFICANT CHANGE UP (ref 0–2)
EOSINOPHIL # BLD AUTO: 0.36 K/UL — SIGNIFICANT CHANGE UP (ref 0–0.5)
EOSINOPHIL NFR BLD AUTO: 4.8 % — SIGNIFICANT CHANGE UP (ref 0–6)
HCT VFR BLD CALC: 35.5 % — SIGNIFICANT CHANGE UP (ref 34.5–45)
HGB BLD-MCNC: 11.5 G/DL — SIGNIFICANT CHANGE UP (ref 11.5–15.5)
IMM GRANULOCYTES NFR BLD AUTO: 0.3 % — SIGNIFICANT CHANGE UP (ref 0–0.9)
LYMPHOCYTES # BLD AUTO: 2.16 K/UL — SIGNIFICANT CHANGE UP (ref 1–3.3)
LYMPHOCYTES # BLD AUTO: 28.6 % — SIGNIFICANT CHANGE UP (ref 13–44)
MCHC RBC-ENTMCNC: 27.8 PG — SIGNIFICANT CHANGE UP (ref 27–34)
MCHC RBC-ENTMCNC: 32.4 G/DL — SIGNIFICANT CHANGE UP (ref 32–36)
MCV RBC AUTO: 85.7 FL — SIGNIFICANT CHANGE UP (ref 80–100)
MONOCYTES # BLD AUTO: 0.55 K/UL — SIGNIFICANT CHANGE UP (ref 0–0.9)
MONOCYTES NFR BLD AUTO: 7.3 % — SIGNIFICANT CHANGE UP (ref 2–14)
NEUTROPHILS # BLD AUTO: 4.41 K/UL — SIGNIFICANT CHANGE UP (ref 1.8–7.4)
NEUTROPHILS NFR BLD AUTO: 58.3 % — SIGNIFICANT CHANGE UP (ref 43–77)
NRBC # BLD: 0 /100 WBCS — SIGNIFICANT CHANGE UP (ref 0–0)
PLATELET # BLD AUTO: 279 K/UL — SIGNIFICANT CHANGE UP (ref 150–400)
RBC # BLD: 4.14 M/UL — SIGNIFICANT CHANGE UP (ref 3.8–5.2)
RBC # FLD: 13.7 % — SIGNIFICANT CHANGE UP (ref 10.3–14.5)
WBC # BLD: 7.55 K/UL — SIGNIFICANT CHANGE UP (ref 3.8–10.5)
WBC # FLD AUTO: 7.55 K/UL — SIGNIFICANT CHANGE UP (ref 3.8–10.5)

## 2022-10-12 PROCEDURE — 99213 OFFICE O/P EST LOW 20 MIN: CPT

## 2022-10-12 NOTE — REASON FOR VISIT
[Follow-Up Visit] : a follow-up visit for [[employer] ] : provided by [employer]   [FreeTextEntry2] : ANNA [Interpreters_IDNumber] : [id number] [Interpreters_FullName] : Daren [TWNoteComboBox1] : Djiboutian

## 2022-10-12 NOTE — ASSESSMENT
[FreeTextEntry1] : 73yo F w/ warm AIHA, s/p prednisone. Now tapered off steroids since Aug 2018\par Hgb 11.5 today -stable, f/u retic, LDH, hapto\par CBC results reviewed with patient\par f/u w/ PMD as scheduled\par RTC 6 mo

## 2022-10-12 NOTE — HISTORY OF PRESENT ILLNESS
[de-identified] : 68yo F w/ HTN, CAD, GERD presented to the ED on 5/2/18 with 2 days of subjective fever and 3 months of left sided ear fullness and tinnitus. Blood work showed Hgb 6.3, further w/u consistent for hemolytic anemia - elevated retic count, elevated LDH, low hapto, indirect hyperbilirubinemia, direct Paolo positive for IgG. Negative stool occult. She was started on 90 mg prednisone on 5/3/18. CT was done which did not show any malignancy. \par \par She was discharged on prednisone 90mg. Hgb 7.0 on day of discharge, received 1u PRBCs prior to discharge. \par Her children live elsewhere around the country. She lives with her .  \par \shea Went to the ED on 5/30/18 for a vesicular rash in area of erythema on chest and back - diagnosed with zoster and started on Valtrex.  [de-identified] : She is now tapered off prednisone since Aug 2018. \par \par She was in the ED on 6/26/19 with nausea/diarrhea. She is here b/c she has "cancer in the lung". \par CT A/P: Mild pancolitis of infectious or inflammatory etiology. Partially imaged right middle lobe ground glass opacity not seen on CT of 5/4/18. Finding can be further evaluated with nonemergent chest CT. \par CT chest 7/10/19: minimal peripheral reticular opacities noted in the right middle lobe. Bilateral pulmonary nodules. \par \par She reports pains in her b/l legs with walking, occasional swelling as well. She reports itching and pins/needles diffusely. She has noted new white spots on both arms and legs. \par Otherwise doing well. \par \par Patient was hospitalized on 9/2/20 in [facility] due to chest pain for several days with new LBBB in setting of HTN urgency with SBP > 240. Initial concern for STEMI based on EKG but did not meet Sgarbossa criteria. Started on IV NTG for BP control and admitted to CCU. IV NTG was tapered off and po antihypertensives were started. Cardiac enzymes were negative. ECHO with normal LV function and plan is for nuclear stress test. Patient also with asymptomatic sinus bradycardia but appropriate HR response with activity. On home donazepil per med rec but not given here ? due to side effect of bradycardia. No evidence of confusion. EP recommending ambulatory EKG monitoring. \par She presented today for f/u, admitted she feels fine overall, she will fly to [place] on 11/7, she is not sure when to come back to [place]. \par \par CT chest done 9/27/21: 6 mm right lower lobe nodule adjacent to a vessel (series 2 image 76) is new from prior. Recommend follow-up chest CT in 3 months to determine stability\par Pt was admitted to [facility] on 10/30/21 due to chest pain, HTN and bradycardia. \par Pt saw cardiologist Dr. Juan Alberto [facility] (Tel ; Fax ). \par \par PMD: Dr. Annemarie Taveras -has appt tomorrow\par She has been doing well.

## 2022-10-14 LAB
ALBUMIN SERPL ELPH-MCNC: 3.8 G/DL
ALP BLD-CCNC: 106 U/L
ALT SERPL-CCNC: 14 U/L
ANION GAP SERPL CALC-SCNC: 11 MMOL/L
AST SERPL-CCNC: 17 U/L
BILIRUB SERPL-MCNC: 0.4 MG/DL
BUN SERPL-MCNC: 11 MG/DL
CALCIUM SERPL-MCNC: 9.3 MG/DL
CHLORIDE SERPL-SCNC: 102 MMOL/L
CO2 SERPL-SCNC: 28 MMOL/L
CREAT SERPL-MCNC: 0.62 MG/DL
EGFR: 93 ML/MIN/1.73M2
GLUCOSE SERPL-MCNC: 126 MG/DL
HAPTOGLOB SERPL-MCNC: 182 MG/DL
LDH SERPL-CCNC: 137 U/L
POTASSIUM SERPL-SCNC: 3.6 MMOL/L
PROT SERPL-MCNC: 6.7 G/DL
SODIUM SERPL-SCNC: 141 MMOL/L

## 2022-10-20 NOTE — ED PROVIDER NOTE - NSICDXFAMILYHX_GEN_ALL_CORE_FT
FAMILY HISTORY:  Family history of diabetes mellitus in father  Family history of hypertension in father  Family history of ischemic heart disease    Sibling  Still living? Unknown  Family history of breast cancer, Age at diagnosis: Age Unknown  Family history of malignant neoplasm of uterus, Age at diagnosis: Age Unknown    
NULL

## 2022-12-20 ENCOUNTER — OUTPATIENT (OUTPATIENT)
Dept: OUTPATIENT SERVICES | Facility: HOSPITAL | Age: 72
LOS: 1 days | End: 2022-12-20
Payer: COMMERCIAL

## 2022-12-20 DIAGNOSIS — Z11.52 ENCOUNTER FOR SCREENING FOR COVID-19: ICD-10-CM

## 2022-12-20 DIAGNOSIS — Z98.890 OTHER SPECIFIED POSTPROCEDURAL STATES: Chronic | ICD-10-CM

## 2022-12-20 LAB — SARS-COV-2 RNA SPEC QL NAA+PROBE: SIGNIFICANT CHANGE UP

## 2022-12-20 PROCEDURE — C9803: CPT

## 2022-12-20 PROCEDURE — U0005: CPT

## 2022-12-20 PROCEDURE — U0003: CPT

## 2022-12-21 ENCOUNTER — OUTPATIENT (OUTPATIENT)
Dept: INPATIENT UNIT | Facility: HOSPITAL | Age: 72
LOS: 1 days | End: 2022-12-21
Payer: COMMERCIAL

## 2022-12-21 ENCOUNTER — TRANSCRIPTION ENCOUNTER (OUTPATIENT)
Age: 72
End: 2022-12-21

## 2022-12-21 VITALS
DIASTOLIC BLOOD PRESSURE: 73 MMHG | OXYGEN SATURATION: 98 % | HEART RATE: 62 BPM | TEMPERATURE: 98 F | RESPIRATION RATE: 14 BRPM | SYSTOLIC BLOOD PRESSURE: 149 MMHG

## 2022-12-21 VITALS
RESPIRATION RATE: 14 BRPM | DIASTOLIC BLOOD PRESSURE: 67 MMHG | OXYGEN SATURATION: 97 % | SYSTOLIC BLOOD PRESSURE: 172 MMHG

## 2022-12-21 DIAGNOSIS — Z98.890 OTHER SPECIFIED POSTPROCEDURAL STATES: Chronic | ICD-10-CM

## 2022-12-21 DIAGNOSIS — I49.5 SICK SINUS SYNDROME: ICD-10-CM

## 2022-12-21 LAB
ALBUMIN SERPL ELPH-MCNC: 4.2 G/DL — SIGNIFICANT CHANGE UP (ref 3.3–5)
ALP SERPL-CCNC: 108 U/L — SIGNIFICANT CHANGE UP (ref 40–120)
ALT FLD-CCNC: 16 U/L — SIGNIFICANT CHANGE UP (ref 10–45)
ANION GAP SERPL CALC-SCNC: 10 MMOL/L — SIGNIFICANT CHANGE UP (ref 5–17)
APTT BLD: 30.5 SEC — SIGNIFICANT CHANGE UP (ref 27.5–35.5)
AST SERPL-CCNC: 20 U/L — SIGNIFICANT CHANGE UP (ref 10–40)
BILIRUB SERPL-MCNC: 1 MG/DL — SIGNIFICANT CHANGE UP (ref 0.2–1.2)
BLD GP AB SCN SERPL QL: NEGATIVE — SIGNIFICANT CHANGE UP
BUN SERPL-MCNC: 12 MG/DL — SIGNIFICANT CHANGE UP (ref 7–23)
CALCIUM SERPL-MCNC: 9.4 MG/DL — SIGNIFICANT CHANGE UP (ref 8.4–10.5)
CHLORIDE SERPL-SCNC: 103 MMOL/L — SIGNIFICANT CHANGE UP (ref 96–108)
CO2 SERPL-SCNC: 29 MMOL/L — SIGNIFICANT CHANGE UP (ref 22–31)
CREAT SERPL-MCNC: 0.7 MG/DL — SIGNIFICANT CHANGE UP (ref 0.5–1.3)
EGFR: 91 ML/MIN/1.73M2 — SIGNIFICANT CHANGE UP
GLUCOSE SERPL-MCNC: 120 MG/DL — HIGH (ref 70–99)
HCT VFR BLD CALC: 40.7 % — SIGNIFICANT CHANGE UP (ref 34.5–45)
HGB BLD-MCNC: 12.9 G/DL — SIGNIFICANT CHANGE UP (ref 11.5–15.5)
INR BLD: 1.03 RATIO — SIGNIFICANT CHANGE UP (ref 0.88–1.16)
MCHC RBC-ENTMCNC: 27 PG — SIGNIFICANT CHANGE UP (ref 27–34)
MCHC RBC-ENTMCNC: 31.7 GM/DL — LOW (ref 32–36)
MCV RBC AUTO: 85.1 FL — SIGNIFICANT CHANGE UP (ref 80–100)
NRBC # BLD: 0 /100 WBCS — SIGNIFICANT CHANGE UP (ref 0–0)
PLATELET # BLD AUTO: 285 K/UL — SIGNIFICANT CHANGE UP (ref 150–400)
POTASSIUM SERPL-MCNC: 3.4 MMOL/L — LOW (ref 3.5–5.3)
POTASSIUM SERPL-SCNC: 3.4 MMOL/L — LOW (ref 3.5–5.3)
PROT SERPL-MCNC: 7.9 G/DL — SIGNIFICANT CHANGE UP (ref 6–8.3)
PROTHROM AB SERPL-ACNC: 12 SEC — SIGNIFICANT CHANGE UP (ref 10.5–13.4)
RBC # BLD: 4.78 M/UL — SIGNIFICANT CHANGE UP (ref 3.8–5.2)
RBC # FLD: 13.5 % — SIGNIFICANT CHANGE UP (ref 10.3–14.5)
RH IG SCN BLD-IMP: POSITIVE — SIGNIFICANT CHANGE UP
RH IG SCN BLD-IMP: POSITIVE — SIGNIFICANT CHANGE UP
SODIUM SERPL-SCNC: 142 MMOL/L — SIGNIFICANT CHANGE UP (ref 135–145)
WBC # BLD: 8.3 K/UL — SIGNIFICANT CHANGE UP (ref 3.8–10.5)
WBC # FLD AUTO: 8.3 K/UL — SIGNIFICANT CHANGE UP (ref 3.8–10.5)

## 2022-12-21 PROCEDURE — 93010 ELECTROCARDIOGRAM REPORT: CPT

## 2022-12-21 PROCEDURE — 71045 X-RAY EXAM CHEST 1 VIEW: CPT | Mod: 26

## 2022-12-21 RX ORDER — OXYCODONE HYDROCHLORIDE 5 MG/1
5 TABLET ORAL ONCE
Refills: 0 | Status: DISCONTINUED | OUTPATIENT
Start: 2022-12-21 | End: 2022-12-21

## 2022-12-21 RX ORDER — CARVEDILOL PHOSPHATE 80 MG/1
12.5 CAPSULE, EXTENDED RELEASE ORAL EVERY 12 HOURS
Refills: 0 | Status: DISCONTINUED | OUTPATIENT
Start: 2022-12-21 | End: 2022-12-21

## 2022-12-21 RX ORDER — ASPIRIN/CALCIUM CARB/MAGNESIUM 324 MG
1 TABLET ORAL
Qty: 0 | Refills: 0 | DISCHARGE
Start: 2022-12-21

## 2022-12-21 RX ORDER — CARVEDILOL PHOSPHATE 80 MG/1
1 CAPSULE, EXTENDED RELEASE ORAL
Qty: 60 | Refills: 0
Start: 2022-12-21

## 2022-12-21 RX ORDER — CANDESARTAN CILEXETIL 8 MG/1
1 TABLET ORAL
Qty: 0 | Refills: 0 | DISCHARGE

## 2022-12-21 RX ORDER — OXYCODONE HYDROCHLORIDE 5 MG/1
1 TABLET ORAL
Qty: 9 | Refills: 0
Start: 2022-12-21 | End: 2022-12-23

## 2022-12-21 RX ORDER — EZETIMIBE 10 MG/1
1 TABLET ORAL
Qty: 0 | Refills: 0 | DISCHARGE

## 2022-12-21 RX ORDER — HYDRALAZINE HCL 50 MG
25 TABLET ORAL THREE TIMES A DAY
Refills: 0 | Status: DISCONTINUED | OUTPATIENT
Start: 2022-12-21 | End: 2022-12-21

## 2022-12-21 RX ORDER — ASPIRIN/CALCIUM CARB/MAGNESIUM 324 MG
81 TABLET ORAL DAILY
Refills: 0 | Status: DISCONTINUED | OUTPATIENT
Start: 2022-12-21 | End: 2022-12-21

## 2022-12-21 RX ORDER — PANTOPRAZOLE SODIUM 20 MG/1
40 TABLET, DELAYED RELEASE ORAL
Refills: 0 | Status: DISCONTINUED | OUTPATIENT
Start: 2022-12-21 | End: 2022-12-21

## 2022-12-21 RX ORDER — LANOLIN ALCOHOL/MO/W.PET/CERES
1 CREAM (GRAM) TOPICAL
Qty: 0 | Refills: 0 | DISCHARGE

## 2022-12-21 RX ORDER — DONEPEZIL HYDROCHLORIDE 10 MG/1
5 TABLET, FILM COATED ORAL AT BEDTIME
Refills: 0 | Status: DISCONTINUED | OUTPATIENT
Start: 2022-12-21 | End: 2022-12-21

## 2022-12-21 RX ORDER — ATORVASTATIN CALCIUM 80 MG/1
20 TABLET, FILM COATED ORAL AT BEDTIME
Refills: 0 | Status: DISCONTINUED | OUTPATIENT
Start: 2022-12-21 | End: 2022-12-21

## 2022-12-21 RX ORDER — VALSARTAN 80 MG/1
80 TABLET ORAL DAILY
Refills: 0 | Status: DISCONTINUED | OUTPATIENT
Start: 2022-12-21 | End: 2022-12-21

## 2022-12-21 RX ORDER — POTASSIUM CHLORIDE 20 MEQ
40 PACKET (EA) ORAL ONCE
Refills: 0 | Status: COMPLETED | OUTPATIENT
Start: 2022-12-21 | End: 2022-12-21

## 2022-12-21 RX ORDER — CARVEDILOL PHOSPHATE 80 MG/1
1 CAPSULE, EXTENDED RELEASE ORAL
Qty: 180 | Refills: 0
Start: 2022-12-21 | End: 2023-03-20

## 2022-12-21 RX ADMIN — OXYCODONE HYDROCHLORIDE 5 MILLIGRAM(S): 5 TABLET ORAL at 14:58

## 2022-12-21 RX ADMIN — Medication 40 MILLIEQUIVALENT(S): at 12:47

## 2022-12-21 RX ADMIN — CARVEDILOL PHOSPHATE 12.5 MILLIGRAM(S): 80 CAPSULE, EXTENDED RELEASE ORAL at 17:16

## 2022-12-21 RX ADMIN — Medication 25 MILLIGRAM(S): at 15:04

## 2022-12-21 RX ADMIN — OXYCODONE HYDROCHLORIDE 5 MILLIGRAM(S): 5 TABLET ORAL at 13:01

## 2022-12-21 NOTE — PATIENT PROFILE ADULT - CENTRAL VENOUS CATHETER/PICC LINE
Neuropathy Cream  Instructions For Home Use    Ingredients: Black Pepper 5%, Lavender 2%, Frankincense 2%    $35 for 60 mg tube, which should last approximately 30 days    Apply to affected areas twice a day, once in the morning and once at night before bed.  If the feet are affected, apply to shins and calves as well as feet.  If fingers are affected, be sure to cover with cotton gloves or light socks and avoid rubbing eyes or mouth.    Do not use on broken skin  Do not rub eyes or mouth    If adverse effect or sensitivity, including skin irritation, redness, burning sensations, discomfort, inflammation or petechiae occurs:    Discontinue use   Wash off with soup and water  Consult with Acupuncturist       no

## 2022-12-21 NOTE — PRE-ANESTHESIA EVALUATION ADULT - NSANTHOSAYNRD_GEN_A_CORE
No. JAVED screening performed.  STOP BANG Legend: 0-2 = LOW Risk; 3-4 = INTERMEDIATE Risk; 5-8 = HIGH Risk

## 2022-12-21 NOTE — PROGRESS NOTE ADULT - SUBJECTIVE AND OBJECTIVE BOX
EP Brief Operative Note    Diagnosis: SSS   Procedure: BiV-PPM  Surgeon: Haim Lance M.D.  Findings: none  EBL: minimal  Specimens: none  Post-op Diagnosis: SSS  Assistants: none      A/P) s/p successful [employer] BiV-PPM, no acute complications    -cxr, ekg  -bed rest x 4 hours and then d/c home  -d/c hctz, increase coreg to 12.5mg bid, but continue other home medications  -f/u with Aishwarya on Jan 13 at 1015am  -f/u with me on Jan 24th at 1030am      Haim Lance M.D., [facility]  Cardiac Electrophysiology  [facility]  [address], [place] [address]  [email or web address]    office [phone]  pager [phone]

## 2022-12-21 NOTE — ASU DISCHARGE PLAN (ADULT/PEDIATRIC) - CARE PROVIDER_API CALL
Haim Lance)  Cardiac Electrophysiology; Cardiovascular Disease; Nuclear Cardiology  [address]  [place] [address]  Phone: [phone]  Fax: [phone]  Established Patient  Follow Up Time: 2 weeks   Haim Lance)  Cardiac Electrophysiology; Cardiovascular Disease; Nuclear Cardiology  [address] [facility] [address]  [place] [address]  Phone: [phone]  Fax: [phone]  Established Patient  Scheduled Appointment: 01/24/2023 10:30 AM    Reynaldo Neff)  Cardiology  [address] [facility], [address]  [place] [address]  Phone: [phone]  Fax: [phone]  Established Patient  Scheduled Appointment: 01/13/2023 10:15 AM

## 2022-12-21 NOTE — PATIENT PROFILE ADULT - FALL HARM RISK - UNIVERSAL INTERVENTIONS
Bed in lowest position, wheels locked, appropriate side rails in place/Call bell, personal items and telephone in reach/Instruct patient to call for assistance before getting out of bed or chair/Non-slip footwear when patient is out of bed/[place] to call system/Physically safe environment - no spills, clutter or unnecessary equipment/Purposeful Proactive Rounding/Room/bathroom lighting operational, light cord in reach

## 2022-12-21 NOTE — ASU DISCHARGE PLAN (ADULT/PEDIATRIC) - PROVIDER TOKENS
PROVIDER:[TOKEN:[[id number]],FOLLOWUP:[2 weeks],ESTABLISHEDPATIENT:[T]] PROVIDER:[TOKEN:[[id number]],SCHEDULEDAPPT:[01/24/2023],SCHEDULEDAPPTTIME:[10:30 AM],ESTABLISHEDPATIENT:[T]],PROVIDER:[TOKEN:[[id number]],SCHEDULEDAPPT:[01/13/2023],SCHEDULEDAPPTTIME:[10:15 AM],ESTABLISHEDPATIENT:[T]]

## 2022-12-21 NOTE — H&P CARDIOLOGY - HISTORY OF PRESENT ILLNESS
72 y/o Croatian speaking female refused , prefers daughter to translate) PMH HTN, HLD, moderate LV dysfunction (EF 40-45% 2021) from a non-ischemic cardiomyopathy (non-obstructive CAD on cath 9/2020), LBBB presents today for BiV-ICD implant. Patient was previously admitted in Nov 2021 with hypertensive urgency and periods of asymptomatic sinus bradycardia- device was deferred at the time, since she was asymptomatic with moderate LV dysfunction. Patient has since developed symptoms approx 2-3 months ago, reports dizziness and near syncope. In her previous admission she was started on BB for NSVT- however due to symptomatic bradycardia BB was stopped.    72 y/o Korean speaking female  ID [id number] PMH HTN, HLD, moderate LV dysfunction (EF 40-45% 2021) from a non-ischemic cardiomyopathy (non-obstructive CAD on cath 9/2020), LBBB presents today for BiV-ICD implant. Patient was previously admitted in Nov 2021 with hypertensive urgency and periods of asymptomatic sinus bradycardia- device was deferred at the time, since she was asymptomatic with moderate LV dysfunction. Patient has since developed symptoms approx 2-3 months ago, reports dizziness and near syncope. In her previous admission she was started on BB for NSVT- however due to symptomatic bradycardia BB was stopped.

## 2022-12-21 NOTE — ASU DISCHARGE PLAN (ADULT/PEDIATRIC) - ASU DC SPECIAL INSTRUCTIONSFT
Please see pre printed discharge instructions sheet . Please see pre printed discharge instructions sheet .  post implant CXR done- no pneumo on CXR clear lungs

## 2022-12-22 PROCEDURE — C1887: CPT

## 2022-12-22 PROCEDURE — C1900: CPT

## 2022-12-22 PROCEDURE — 85610 PROTHROMBIN TIME: CPT

## 2022-12-22 PROCEDURE — C1898: CPT

## 2022-12-22 PROCEDURE — 86922 COMPATIBILITY TEST ANTIGLOB: CPT

## 2022-12-22 PROCEDURE — 86900 BLOOD TYPING SEROLOGIC ABO: CPT

## 2022-12-22 PROCEDURE — 85730 THROMBOPLASTIN TIME PARTIAL: CPT

## 2022-12-22 PROCEDURE — C1769: CPT

## 2022-12-22 PROCEDURE — C2621: CPT

## 2022-12-22 PROCEDURE — 71045 X-RAY EXAM CHEST 1 VIEW: CPT

## 2022-12-22 PROCEDURE — 86901 BLOOD TYPING SEROLOGIC RH(D): CPT

## 2022-12-22 PROCEDURE — 86850 RBC ANTIBODY SCREEN: CPT

## 2022-12-22 PROCEDURE — 85027 COMPLETE CBC AUTOMATED: CPT

## 2022-12-22 PROCEDURE — C1730: CPT

## 2022-12-22 PROCEDURE — C1892: CPT

## 2022-12-22 PROCEDURE — 33208 INSRT HEART PM ATRIAL & VENT: CPT

## 2022-12-22 PROCEDURE — 93005 ELECTROCARDIOGRAM TRACING: CPT

## 2022-12-22 PROCEDURE — 80053 COMPREHEN METABOLIC PANEL: CPT

## 2022-12-22 PROCEDURE — 33225 L VENTRIC PACING LEAD ADD-ON: CPT

## 2022-12-22 RX ORDER — OMEPRAZOLE 10 MG/1
1 CAPSULE, DELAYED RELEASE ORAL
Qty: 0 | Refills: 0 | DISCHARGE

## 2022-12-28 NOTE — PROVIDER CONTACT NOTE (OTHER) - SITUATION
4
HR 49
HR 53
HR 53, /73
Patient  with bp 187/59 and heartrate 57 but denies symptoms. Am bp meds given.
Patient with heart rate 59 w/o symptoms.
Patient's /56
Patient's /62 & HR 48
Patient's bp 115/44  and heart rate 50  s/o symptoms. Patient has iv fluids infusing. Coreg held as per parameters.
diastolic bp51  bp 133/51.
elevated blood pressure and low heart rate
elevated blood pressure and low heart rate
Quality 226: Preventive Care And Screening: Tobacco Use: Screening And Cessation Intervention: Patient screened for tobacco use and is an ex/non-smoker
Quality 111:Pneumonia Vaccination Status For Older Adults: Pneumococcal vaccine (PPSV23) administered on or after patient’s 60th birthday and before the end of the measurement period
Detail Level: Detailed
Quality 110: Preventive Care And Screening: Influenza Immunization: Influenza Immunization Administered during Influenza season

## 2023-03-22 NOTE — ED ADULT NURSE NOTE - CAS DISCH BELONGINGS RETURNED
Airway  Performed by: Darrius Bailey MD  Authorized by: Darrius Bailey MD     Final Airway Type:  Endotracheal airway  Final Endotracheal Airway*:  ETT  ETT Size (mm)*:  7.0  Cuff*:  Regular  Technique Used for Successful ETT Placement:  Video laryngoscopy  Devices/Methods Used in Placement*:  Oral ETT, Stylet and Mask  Intubation Procedure*:  ETCO2, Preoxygenation, Atraumatic, Dentition Unchanged and Pharynx Clear  Insertion Site:  Oral  Blade Type*:  Video Laryngoscope (Rodriguez)  Blade Size*:  3  Cuff Volume (mL):  8  Measured from*:  Lips  Secured at (cm)*:  22  Placement Verified by: auscultation, capnometry and equal breath sounds    Glottic View*:  1 - full view of glottis  Attempts*:  1  Ventilation Between Attempts:  None  Number of Other Approaches Attempted:  0   Patient Identified, Procedure confirmed, Emergency equipment available and Safety protocols followed  Location:  OR  Urgency:  Elective  Difficult Airway: No    Indications for Airway Management:  Anesthesia  Spontaneous Ventilation: present    Sedation Level:  Anesthetized  MILS Maintained Throughout: No    Mask Difficulty Assessment:  1 - vent by mask  Performed By:  Anesthesiologist  Anesthesiologist:  Darrius Bailey MD  Start Time: 3/22/2023 12:45 PM     Not applicable

## 2023-04-10 ENCOUNTER — OUTPATIENT (OUTPATIENT)
Dept: OUTPATIENT SERVICES | Facility: HOSPITAL | Age: 73
LOS: 1 days | Discharge: HOME | End: 2023-04-10

## 2023-04-10 DIAGNOSIS — D64.9 ANEMIA, UNSPECIFIED: ICD-10-CM

## 2023-04-10 DIAGNOSIS — Z98.890 OTHER SPECIFIED POSTPROCEDURAL STATES: Chronic | ICD-10-CM

## 2023-04-12 ENCOUNTER — RESULT REVIEW (OUTPATIENT)
Age: 73
End: 2023-04-12

## 2023-04-12 ENCOUNTER — APPOINTMENT (OUTPATIENT)
Dept: HEMATOLOGY ONCOLOGY | Facility: CLINIC | Age: 73
End: 2023-04-12

## 2023-04-12 ENCOUNTER — APPOINTMENT (OUTPATIENT)
Dept: HEMATOLOGY ONCOLOGY | Facility: CLINIC | Age: 73
End: 2023-04-12
Payer: MEDICARE

## 2023-04-12 VITALS
OXYGEN SATURATION: 99 % | SYSTOLIC BLOOD PRESSURE: 144 MMHG | BODY MASS INDEX: 37.54 KG/M2 | HEART RATE: 59 BPM | DIASTOLIC BLOOD PRESSURE: 78 MMHG | RESPIRATION RATE: 16 BRPM | TEMPERATURE: 97 F | WEIGHT: 206.35 LBS

## 2023-04-12 LAB
BASOPHILS # BLD AUTO: 0.05 K/UL — SIGNIFICANT CHANGE UP (ref 0–0.2)
BASOPHILS NFR BLD AUTO: 0.7 % — SIGNIFICANT CHANGE UP (ref 0–2)
EOSINOPHIL # BLD AUTO: 0.24 K/UL — SIGNIFICANT CHANGE UP (ref 0–0.5)
EOSINOPHIL NFR BLD AUTO: 3.3 % — SIGNIFICANT CHANGE UP (ref 0–6)
HCT VFR BLD CALC: 36.6 % — SIGNIFICANT CHANGE UP (ref 34.5–45)
HGB BLD-MCNC: 11.7 G/DL — SIGNIFICANT CHANGE UP (ref 11.5–15.5)
IMM GRANULOCYTES NFR BLD AUTO: 0.3 % — SIGNIFICANT CHANGE UP (ref 0–0.9)
LYMPHOCYTES # BLD AUTO: 1.67 K/UL — SIGNIFICANT CHANGE UP (ref 1–3.3)
LYMPHOCYTES # BLD AUTO: 23 % — SIGNIFICANT CHANGE UP (ref 13–44)
MCHC RBC-ENTMCNC: 26.7 PG — LOW (ref 27–34)
MCHC RBC-ENTMCNC: 32 G/DL — SIGNIFICANT CHANGE UP (ref 32–36)
MCV RBC AUTO: 83.4 FL — SIGNIFICANT CHANGE UP (ref 80–100)
MONOCYTES # BLD AUTO: 0.45 K/UL — SIGNIFICANT CHANGE UP (ref 0–0.9)
MONOCYTES NFR BLD AUTO: 6.2 % — SIGNIFICANT CHANGE UP (ref 2–14)
NEUTROPHILS # BLD AUTO: 4.82 K/UL — SIGNIFICANT CHANGE UP (ref 1.8–7.4)
NEUTROPHILS NFR BLD AUTO: 66.5 % — SIGNIFICANT CHANGE UP (ref 43–77)
NRBC # BLD: 0 /100 WBCS — SIGNIFICANT CHANGE UP (ref 0–0)
PLATELET # BLD AUTO: 253 K/UL — SIGNIFICANT CHANGE UP (ref 150–400)
RBC # BLD: 4.39 M/UL — SIGNIFICANT CHANGE UP (ref 3.8–5.2)
RBC # FLD: 14.5 % — SIGNIFICANT CHANGE UP (ref 10.3–14.5)
WBC # BLD: 7.25 K/UL — SIGNIFICANT CHANGE UP (ref 3.8–10.5)
WBC # FLD AUTO: 7.25 K/UL — SIGNIFICANT CHANGE UP (ref 3.8–10.5)

## 2023-04-12 PROCEDURE — 99214 OFFICE O/P EST MOD 30 MIN: CPT

## 2023-04-12 NOTE — HISTORY OF PRESENT ILLNESS
[de-identified] : 68yo F w/ HTN, CAD, GERD presented to the ED on 5/2/18 with 2 days of subjective fever and 3 months of left sided ear fullness and tinnitus. Blood work showed Hgb 6.3, further w/u consistent for hemolytic anemia - elevated retic count, elevated LDH, low hapto, indirect hyperbilirubinemia, direct Paolo positive for IgG. Negative stool occult. She was started on 90 mg prednisone on 5/3/18. CT was done which did not show any malignancy. \par \par She was discharged on prednisone 90mg. Hgb 7.0 on day of discharge, received 1u PRBCs prior to discharge. \par Her children live elsewhere around the country. She lives with her .  \par \shea Went to the ED on 5/30/18 for a vesicular rash in area of erythema on chest and back - diagnosed with zoster and started on Valtrex.  [de-identified] : She is now tapered off prednisone since Aug 2018. \par \par She was in the ED on 6/26/19 with nausea/diarrhea. She is here b/c she has "cancer in the lung". \par CT A/P: Mild pancolitis of infectious or inflammatory etiology. Partially imaged right middle lobe ground glass opacity not seen on CT of 5/4/18. Finding can be further evaluated with nonemergent chest CT. \par CT chest 7/10/19: minimal peripheral reticular opacities noted in the right middle lobe. Bilateral pulmonary nodules. \par \par She reports pains in her b/l legs with walking, occasional swelling as well. She reports itching and pins/needles diffusely. She has noted new white spots on both arms and legs. \par Otherwise doing well. \par \par Patient was hospitalized on 9/2/20 in [facility] due to chest pain for several days with new LBBB in setting of HTN urgency with SBP > 240. Initial concern for STEMI based on EKG but did not meet Sgarbossa criteria. Started on IV NTG for BP control and admitted to CCU. IV NTG was tapered off and po antihypertensives were started. Cardiac enzymes were negative. ECHO with normal LV function and plan is for nuclear stress test. Patient also with asymptomatic sinus bradycardia but appropriate HR response with activity. On home donazepil per med rec but not given here ? due to side effect of bradycardia. No evidence of confusion. EP recommending ambulatory EKG monitoring. \par She presented today for f/u, admitted she feels fine overall, she will fly to [place] on 11/7, she is not sure when to come back to [place]. \par \par CT chest done 9/27/21: 6 mm right lower lobe nodule adjacent to a vessel (series 2 image 76) is new from prior. Recommend follow-up chest CT in 3 months to determine stability\par Pt was admitted to [facility] on 10/30/21 due to chest pain, HTN and bradycardia. \par Pt saw cardiologist Dr. Juan Alberto Vital of [facility] (Tel ; Fax ). \par \par PMD: Dr. Annemarie Taveras -has appt tomorrow\par She has been doing well. \par \par 4/12/23: pt was seen today, she had a pacemaker placement 12/2022, currently is following up with cardiologist Dr. Neff at [facility] ([phone]), will f/u next Monday. \par Other than that she is doing well, f/u PMD routinely

## 2023-04-12 NOTE — REASON FOR VISIT
[Follow-Up Visit] : a follow-up visit for [[employer] ] : provided by [employer]   [FreeTextEntry2] : ANNA [Interpreters_IDNumber] : [id number] [Interpreters_FullName] : Dedrick [TWNoteComboBox1] : Australian

## 2023-04-12 NOTE — ASSESSMENT
[FreeTextEntry1] : 73yo F w/ warm AIHA, s/p prednisone. Now tapered off steroids since Aug 2018\par Hgb 11.7 today -stable, f/u retic, LDH, hapto\par CBC results reviewed with patient\par f/u w/ PMD as scheduled\par RTC 6 mo\par \par Case and management discussed with Dr. Quan\par

## 2023-04-13 LAB
RETICS #: 91.3 K/UL — SIGNIFICANT CHANGE UP (ref 25–125)
RETICS/RBC NFR: 2.1 % — SIGNIFICANT CHANGE UP (ref 0.5–2.5)

## 2023-04-14 LAB
ALBUMIN SERPL ELPH-MCNC: 3.9 G/DL
ALP BLD-CCNC: 111 U/L
ALT SERPL-CCNC: 16 U/L
ANION GAP SERPL CALC-SCNC: 13 MMOL/L
AST SERPL-CCNC: 18 U/L
BILIRUB SERPL-MCNC: 0.5 MG/DL
BUN SERPL-MCNC: 16 MG/DL
CALCIUM SERPL-MCNC: 9.9 MG/DL
CHLORIDE SERPL-SCNC: 103 MMOL/L
CO2 SERPL-SCNC: 26 MMOL/L
CREAT SERPL-MCNC: 0.76 MG/DL
EGFR: 82 ML/MIN/1.73M2
GLUCOSE SERPL-MCNC: 117 MG/DL
HAPTOGLOB SERPL-MCNC: 171 MG/DL
LDH SERPL-CCNC: 149 U/L
POTASSIUM SERPL-SCNC: 4 MMOL/L
PROT SERPL-MCNC: 7.1 G/DL
SODIUM SERPL-SCNC: 143 MMOL/L

## 2023-08-07 NOTE — PROGRESS NOTE ADULT - SUBJECTIVE AND OBJECTIVE BOX
Yes- ok to prescribe cytotec to use prior to Paragard insertion.  Routed to RN triage.   HISTORY OF PRESENT ILLNESS:   Ms Capone follows with Dr Vital at [facility].  She is a 69yo woman with hx CAD and uncontrolled severe hypertension.  She speaks English.  History with aide of  at bedside.    She presented with chest pain for a few days duration, and admitted to the CCU for management of hypertensive emergency with SBP >220mmHg.  She was bradycardic at that time with sinus heartrate in the 40s. She has a new LBBB compared to EKG from 1/2020 with narrow QRS complex.    She states she is not having any palpitations, orthopnea, LE swelling, shortness of breath.  Can walk a few blocks without D.O.E..  Has been aware of slow heartrate for a few years.  No angina at this time.  No history of lightheadedness/near-syncope or syncope. A 10 pt ROS is negative.    9/4- no interval significant changes. stable sinus radha 50's on telemetry.  no SVT or VT.  BP is improving on nitro drip.  no angina or palpitations.  reviewed echocardiogram's reassuring results w/ patient.     9/5 - no significant bradycardia, HR stable in 50s-60s. no pauses.  BP stable on oral medications. not experiencing angina or palpitations. has not been ambulating yet.    acetaminophen   Tablet .. 650 milliGRAM(s) Oral every 6 hours PRN  amLODIPine   Tablet 10 milliGRAM(s) Oral daily  aspirin  chewable 81 milliGRAM(s) Oral daily  atorvastatin 40 milliGRAM(s) Oral at bedtime  chlorhexidine 4% Liquid 1 Application(s) Topical <User Schedule>  heparin   Injectable 5000 Unit(s) SubCutaneous every 12 hours  hydrochlorothiazide 25 milliGRAM(s) Oral daily  influenza  Vaccine (HIGH DOSE) 0.7 milliLiter(s) IntraMuscular once  melatonin 3 milliGRAM(s) Oral at bedtime  pantoprazole    Tablet 40 milliGRAM(s) Oral before breakfast  polyethylene glycol 3350 17 Gram(s) Oral daily  senna 2 Tablet(s) Oral at bedtime                            12.6   9.35  )-----------( 266      ( 05 Sep 2020 05:45 )             41.1       09-05    140  |  105  |  14  ----------------------------<  91  4.0   |  23  |  0.72    Ca    9.2      05 Sep 2020 05:45  Phos  3.6     09-04  Mg     2.0     09-05    CARDIAC MARKERS ( 03 Sep 2020 06:07 )  x     / x     / 55 u/L / 1.80 ng/mL / x      CARDIAC MARKERS ( 03 Sep 2020 01:08 )  x     / x     / 63 u/L / 1.82 ng/mL / x      CARDIAC MARKERS ( 02 Sep 2020 21:52 )  x     / x     / 83 u/L / 1.90 ng/mL / x        T(C): 36.7 (09-05-20 @ 08:00), Max: 37 (09-04-20 @ 15:51)  HR: 59 (09-05-20 @ 15:00) (48 - 79)  BP: 172/89 (09-05-20 @ 15:00) (128/40 - 173/66)  RR: 18 (09-05-20 @ 15:00) (13 - 32)  SpO2: 95% (09-05-20 @ 10:00) (91% - 98%)  Wt(kg): --    I&O's Summary    04 Sep 2020 07:01  -  05 Sep 2020 07:00  --------------------------------------------------------  IN: 0 mL / OUT: 350 mL / NET: -350 mL    05 Sep 2020 07:01  -  05 Sep 2020 15:11  --------------------------------------------------------  IN: 400 mL / OUT: 0 mL / NET: 400 mL    Appearance: Overweight  female in no acute distress	  HEENT:   Normal oral mucosa, PERRL, EOMI	  Lymphatic: No lymphadenopathy , no edema  Cardiovascular: Normal S1 S2, No JVD, No murmurs , Peripheral pulses palpable 2+ bilaterally  Respiratory: Lungs clear to auscultation, normal effort 	  Gastrointestinal:  Soft, Non-tender, + BS	  Skin: No rashes, No ecchymoses, No cyanosis, warm to touch  Musculoskeletal: Normal range of motion, normal strength  Psychiatry:  Mood & affect appropriate    TELEMETRY: sinus radha 40s, to sinus rhythm 70-80.	    ECG:  sinus radha 40s, normal GA, LBBB 140ms.  Echo: mild LVH, normal EF despite hypertensive condition, no severe valve dysfunction.      ASSESSMENT/PLAN: 	70y Female with CAD, pre-diabetes, HTN, presents with atypical chest pain and hypertensive emergency.    Echo with normal EF.  Oral vasodilators have been started by CCU.  BP remains labile, 120-170mmHg systolic.    Consider ARB + MRA neurohormonal blockade in management of her hypertension, since she has developed LV hypertrophy.    LBBB is new compared to last EKG with narrow QRS in 1/2020.  Normal GA suggests conduction defect below the bundle of His.    Will observe heartrate to see maximum rate of conduction, and to look for any alternating LBBB/RBBB morphology.    Prior to discharge, I would like to see an ambulatory EKG (whether in hallway or formally on treadmill) to assess for chronotropic competence / infra-Hisian block at higher heartrates.    Will follow.    Phong Starks M.D.  Cardiac Electrophysiology    office [phone]  pager [phone]

## 2023-08-25 NOTE — PRE-OP CHECKLIST - ISOLATION PRECAUTIONS
[facility] ([facility])  Colon and Rectal Surgery  History & Physical    Patient Name: Patrick Sanz  MRN: [id number]  Admission Date: 8/25/2023  Attending Physician: Cj Quinn MD  Primary Care Provider: Edilberto Figueroa MD    Patient information was obtained from patient and medical records.    Subjective:     Chief Complaint/Reason for Admission: Here for Colonoscopy    History of Present Illness:  Patient is a 49 y.o. male presents for colonoscopy. Pt with previous rectosigmoid/upper rectal adenocarcinoma now s/p robotic converted to open LAR with final pathology showing Stage I T2N0 adenocarcinoma in 2022. No current hematochezia, melena or change in bowel habits.  He denies any family history of colorectal cancer or IBD.    No current facility-administered medications on file prior to encounter.     Current Outpatient Medications on File Prior to Encounter   Medication Sig    atorvastatin (LIPITOR) 80 MG tablet Take 1 tablet (80 mg total) by mouth once daily. TK 1 T PO D    empagliflozin (JARDIANCE) 25 mg tablet Take 1 tablet (25 mg total) by mouth once daily.    insulin glargine-yfgn (SEMGLEE,INSULIN GLARG-YFGN,PEN) 100 unit/mL (3 mL) InPn INJECT 30 UNITS SUBCUTANEOUSLY ONCE DAILY IN THE EVENING    insulin lispro (HUMALOG KWIKPEN INSULIN) 100 unit/mL pen Inject 12 Units into the skin 3 (three) times daily with meals.    lamoTRIgine (LAMICTAL) 200 MG tablet TAKE 1 & 1/2 (ONE & ONE-HALF) TABLETS BY MOUTH ONCE DAILY    blood-glucose sensor (DEXCOM G7 SENSOR) Miriam 1 each by Misc.(Non-Drug; Combo Route) route every 10 days.    blood-glucose transmitter (DEXCOM G6 TRANSMITTER) Miriam 1 each by Misc.(Non-Drug; Combo Route) route once daily.    docusate sodium (COLACE) 100 MG capsule Take 1 capsule (100 mg total) by mouth 2 (two) times daily as needed.    flash glucose sensor (FREESTYLE JOAQUIM 2 SENSOR) Kit 1 each by Misc.(Non-Drug; Combo Route) route every 14 (fourteen) days.    glipiZIDE (GLUCOTROL)  5 MG tablet Take 1 tablet (5 mg total) by mouth 2 (two) times daily with meals.    pep injection Inject 0.15 ml as directed     For compounding pharmacy use:   Add PAPAVERINE 30 mg  Add PHENTOLAMINE 1 mg  Add ALPROSTADIL 20 mcg    semaglutide (OZEMPIC) 1 mg/dose (4 mg/3 mL) Inject 1 mg into the skin every 7 days.    tadalafiL (CIALIS) 20 MG Tab     traMADoL (ULTRAM) 50 mg tablet Take 1 tablet (50 mg total) by mouth every 6 (six) hours as needed for Pain.       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 Patrick Sanz is a 48 y.o. male patient of Dr. Woodward status post robot-assisted radical prostatectomy with partial nerve sparing on 10/11/21 for what proved to be a 8cc, pGS4+3 (70% high grade) [id number] (0/15) prostate cancer excised with a focal positive 11mm margin and a positive benign margin. His RADHA-S score is 7. His post op PSA was low detectable and his most recent is now 0.177. H    Sleep apnea      Past Surgical History:   Procedure Laterality Date    COLON SURGERY  10.2022    COLONOSCOPY N/A 08/04/2022    Procedure: COLONOSCOPY;  Surgeon: Michelle Flores MD;  Location: [facility] ENDO;  Service: Endoscopy;  Laterality: N/A;    HERNIA REPAIR      INJECTION OF ANESTHETIC AGENT INTO TISSUE PLANE DEFINED BY TRANSVERSUS ABDOMINIS MUSCLE N/A 09/22/2022    Procedure: BLOCK, TRANSVERSUS ABDOMINIS PLANE;  Surgeon: Cj Quinn MD;  Location: [facility] OR;  Service: General;  Laterality: N/A;    MOBILIZATION OF SPLENIC FLEXURE N/A 09/22/2022    Procedure: MOBILIZATION, SPLENIC FLEXURE;  Surgeon: Cj  HARRY Quinn MD;  Location: [facility] OR;  Service: General;  Laterality: N/A;    PROSTATE SURGERY  09.2021    ROBOT-ASSISTED LOW ANTERIOR RESECTION OF COLON N/A 09/22/2022    Procedure: XI ROBOTIC RESECTION, COLON, LOW ANTERIOR;  Surgeon: Cj Quinn MD;  Location: [facility] OR;  Service: General;  Laterality: N/A;  CONVERTED TO OPEN     Family History       Problem Relation (Age of Onset)    Diabetes Mother, Maternal Uncle, Maternal Aunt    Hearing loss Mother    Hypertension Father          Tobacco Use    Smoking status: Former     Current packs/day: 0.00     Average packs/day: 0.3 packs/day for 15.0 years (3.8 ttl pk-yrs)     Types: Cigarettes     Start date: 1/1/1998     Quit date: 1/1/2013     Years since quitting: 10.6    Smokeless tobacco: Former    Tobacco comments:     Was a part time smoker.  1 pack could last 2 or more weeks   Substance and Sexual Activity    Alcohol use: Not Currently     Comment: May have 1 or 2 drinks at social events or restaurants    Drug use: Never    Sexual activity: Yes     Partners: Female     Birth control/protection: Partner-Vasectomy, Post-menopausal     Review of Systems   Constitutional:  Negative for activity change, appetite change, chills, fatigue, fever and unexpected weight change.   HENT:  Negative for congestion, ear pain, sore throat and trouble swallowing.    Eyes:  Negative for pain, redness and itching.   Respiratory:  Negative for cough, shortness of breath and wheezing.    Cardiovascular:  Negative for chest pain, palpitations and leg swelling.   Gastrointestinal:  Negative for abdominal distention, abdominal pain, anal bleeding, blood in stool, constipation, diarrhea, nausea, rectal pain and vomiting.   Endocrine: Negative for cold intolerance, heat intolerance and polyuria.   Genitourinary:  Negative for dysuria, flank pain, frequency and hematuria.   Musculoskeletal:  Negative for gait problem, joint swelling and neck pain.   Skin:  Negative for color change,  rash and wound.   Allergic/Immunologic: Negative for environmental allergies and immunocompromised state.   Neurological:  Negative for dizziness, speech difficulty, weakness and numbness.   Psychiatric/Behavioral:  Negative for agitation, confusion and hallucinations.      Objective:     Vital Signs (Most Recent):  Temp: 97.7 °F (36.5 °C) (08/25/23 0659)  Pulse: 103 (08/25/23 0659)  Resp: 16 (08/25/23 0659)  BP: 127/85 (08/25/23 0659)  SpO2: 98 % (08/25/23 0659) Vital Signs (24h Range):  Temp:  [97.7 °F (36.5 °C)] 97.7 °F (36.5 °C)  Pulse:  [103] 103  Resp:  [16] 16  SpO2:  [98 %] 98 %  BP: (127)/(85) 127/85     Weight: 104.3 kg (230 lb)  Body mass index is 34.97 kg/m².    Physical Exam  Constitutional:       Appearance: He is well-developed.   HENT:      Head: Normocephalic and atraumatic.   Eyes:      Conjunctiva/sclera: Conjunctivae normal.   Neck:      Thyroid: No thyromegaly.   Cardiovascular:      Rate and Rhythm: Normal rate and regular rhythm.   Pulmonary:      Effort: Pulmonary effort is normal. No respiratory distress.   Abdominal:      General: There is no distension.      Palpations: Abdomen is soft. There is no mass.      Tenderness: There is no abdominal tenderness.   Musculoskeletal:         General: No tenderness. Normal range of motion.      Cervical back: Normal range of motion.   Skin:     General: Skin is warm and dry.      Capillary Refill: Capillary refill takes less than 2 seconds.      Findings: No rash.   Neurological:      Mental Status: He is alert and oriented to person, place, and time.       Assessment/Plan:     Patient is a 49 y.o. male who presents for colonoscopy     - Ok to proceed to endoscopy suite for colonoscopy  - Consent obtained. All risks, benefits and alternatives fully explained to patient, including but not limited to bleeding, infection, perforation, and missed polyps. All questions appropriately answered to patient's satisfaction. Consent signed and placed on  chart.    There are no hospital problems to display for this patient.    VTE Risk Mitigation (From admission, onward)      None            Cj Quinn MD  Colon and Rectal Surgery  [facility] ([facility])   none

## 2023-10-10 ENCOUNTER — OUTPATIENT (OUTPATIENT)
Dept: OUTPATIENT SERVICES | Facility: HOSPITAL | Age: 73
LOS: 1 days | Discharge: HOME | End: 2023-10-10

## 2023-10-10 DIAGNOSIS — Z98.890 OTHER SPECIFIED POSTPROCEDURAL STATES: Chronic | ICD-10-CM

## 2023-10-10 DIAGNOSIS — D64.9 ANEMIA, UNSPECIFIED: ICD-10-CM

## 2023-10-11 ENCOUNTER — APPOINTMENT (OUTPATIENT)
Dept: HEMATOLOGY ONCOLOGY | Facility: CLINIC | Age: 73
End: 2023-10-11

## 2023-10-18 ENCOUNTER — INPATIENT (INPATIENT)
Facility: HOSPITAL | Age: 73
LOS: 0 days | Discharge: HOME | End: 2023-10-19
Attending: HOSPITALIST | Admitting: HOSPITALIST
Payer: MEDICARE

## 2023-10-18 VITALS
TEMPERATURE: 98 F | SYSTOLIC BLOOD PRESSURE: 158 MMHG | RESPIRATION RATE: 20 BRPM | DIASTOLIC BLOOD PRESSURE: 76 MMHG | HEART RATE: 58 BPM | OXYGEN SATURATION: 100 %

## 2023-10-18 DIAGNOSIS — I25.10 ATHEROSCLEROTIC HEART DISEASE OF NATIVE CORONARY ARTERY WITHOUT ANGINA PECTORIS: ICD-10-CM

## 2023-10-18 DIAGNOSIS — Z29.9 ENCOUNTER FOR PROPHYLACTIC MEASURES, UNSPECIFIED: ICD-10-CM

## 2023-10-18 DIAGNOSIS — I50.22 CHRONIC SYSTOLIC (CONGESTIVE) HEART FAILURE: ICD-10-CM

## 2023-10-18 DIAGNOSIS — R07.9 CHEST PAIN, UNSPECIFIED: ICD-10-CM

## 2023-10-18 DIAGNOSIS — Z98.890 OTHER SPECIFIED POSTPROCEDURAL STATES: Chronic | ICD-10-CM

## 2023-10-18 DIAGNOSIS — I10 ESSENTIAL (PRIMARY) HYPERTENSION: ICD-10-CM

## 2023-10-18 LAB
ALBUMIN SERPL ELPH-MCNC: 3.8 G/DL — SIGNIFICANT CHANGE UP (ref 3.3–5)
ALBUMIN SERPL ELPH-MCNC: 3.8 G/DL — SIGNIFICANT CHANGE UP (ref 3.3–5)
ALP SERPL-CCNC: 114 U/L — SIGNIFICANT CHANGE UP (ref 40–120)
ALP SERPL-CCNC: 114 U/L — SIGNIFICANT CHANGE UP (ref 40–120)
ALT FLD-CCNC: 12 U/L — SIGNIFICANT CHANGE UP (ref 4–33)
ALT FLD-CCNC: 12 U/L — SIGNIFICANT CHANGE UP (ref 4–33)
ANION GAP SERPL CALC-SCNC: 9 MMOL/L — SIGNIFICANT CHANGE UP (ref 7–14)
ANION GAP SERPL CALC-SCNC: 9 MMOL/L — SIGNIFICANT CHANGE UP (ref 7–14)
APTT BLD: 32.3 SEC — SIGNIFICANT CHANGE UP (ref 24.5–35.6)
APTT BLD: 32.3 SEC — SIGNIFICANT CHANGE UP (ref 24.5–35.6)
AST SERPL-CCNC: 15 U/L — SIGNIFICANT CHANGE UP (ref 4–32)
AST SERPL-CCNC: 15 U/L — SIGNIFICANT CHANGE UP (ref 4–32)
BASOPHILS # BLD AUTO: 0.06 K/UL — SIGNIFICANT CHANGE UP (ref 0–0.2)
BASOPHILS # BLD AUTO: 0.06 K/UL — SIGNIFICANT CHANGE UP (ref 0–0.2)
BASOPHILS NFR BLD AUTO: 0.9 % — SIGNIFICANT CHANGE UP (ref 0–2)
BASOPHILS NFR BLD AUTO: 0.9 % — SIGNIFICANT CHANGE UP (ref 0–2)
BILIRUB SERPL-MCNC: 0.4 MG/DL — SIGNIFICANT CHANGE UP (ref 0.2–1.2)
BILIRUB SERPL-MCNC: 0.4 MG/DL — SIGNIFICANT CHANGE UP (ref 0.2–1.2)
BUN SERPL-MCNC: 12 MG/DL — SIGNIFICANT CHANGE UP (ref 7–23)
BUN SERPL-MCNC: 12 MG/DL — SIGNIFICANT CHANGE UP (ref 7–23)
CALCIUM SERPL-MCNC: 9.2 MG/DL — SIGNIFICANT CHANGE UP (ref 8.4–10.5)
CALCIUM SERPL-MCNC: 9.2 MG/DL — SIGNIFICANT CHANGE UP (ref 8.4–10.5)
CHLORIDE SERPL-SCNC: 105 MMOL/L — SIGNIFICANT CHANGE UP (ref 98–107)
CHLORIDE SERPL-SCNC: 105 MMOL/L — SIGNIFICANT CHANGE UP (ref 98–107)
CK MB BLD-MCNC: 3 % — HIGH (ref 0–2.5)
CK MB BLD-MCNC: 3 % — HIGH (ref 0–2.5)
CK MB CFR SERPL CALC: 1.4 NG/ML — SIGNIFICANT CHANGE UP
CK MB CFR SERPL CALC: 1.4 NG/ML — SIGNIFICANT CHANGE UP
CK SERPL-CCNC: 46 U/L — SIGNIFICANT CHANGE UP (ref 25–170)
CK SERPL-CCNC: 46 U/L — SIGNIFICANT CHANGE UP (ref 25–170)
CO2 SERPL-SCNC: 27 MMOL/L — SIGNIFICANT CHANGE UP (ref 22–31)
CO2 SERPL-SCNC: 27 MMOL/L — SIGNIFICANT CHANGE UP (ref 22–31)
CREAT SERPL-MCNC: 0.63 MG/DL — SIGNIFICANT CHANGE UP (ref 0.5–1.3)
CREAT SERPL-MCNC: 0.63 MG/DL — SIGNIFICANT CHANGE UP (ref 0.5–1.3)
EGFR: 92 ML/MIN/1.73M2 — SIGNIFICANT CHANGE UP
EGFR: 92 ML/MIN/1.73M2 — SIGNIFICANT CHANGE UP
EOSINOPHIL # BLD AUTO: 0.27 K/UL — SIGNIFICANT CHANGE UP (ref 0–0.5)
EOSINOPHIL # BLD AUTO: 0.27 K/UL — SIGNIFICANT CHANGE UP (ref 0–0.5)
EOSINOPHIL NFR BLD AUTO: 3.9 % — SIGNIFICANT CHANGE UP (ref 0–6)
EOSINOPHIL NFR BLD AUTO: 3.9 % — SIGNIFICANT CHANGE UP (ref 0–6)
GLUCOSE SERPL-MCNC: 94 MG/DL — SIGNIFICANT CHANGE UP (ref 70–99)
GLUCOSE SERPL-MCNC: 94 MG/DL — SIGNIFICANT CHANGE UP (ref 70–99)
HCT VFR BLD CALC: 36.4 % — SIGNIFICANT CHANGE UP (ref 34.5–45)
HCT VFR BLD CALC: 36.4 % — SIGNIFICANT CHANGE UP (ref 34.5–45)
HGB BLD-MCNC: 11.8 G/DL — SIGNIFICANT CHANGE UP (ref 11.5–15.5)
HGB BLD-MCNC: 11.8 G/DL — SIGNIFICANT CHANGE UP (ref 11.5–15.5)
IANC: 4.34 K/UL — SIGNIFICANT CHANGE UP (ref 1.8–7.4)
IANC: 4.34 K/UL — SIGNIFICANT CHANGE UP (ref 1.8–7.4)
IMM GRANULOCYTES NFR BLD AUTO: 0.3 % — SIGNIFICANT CHANGE UP (ref 0–0.9)
IMM GRANULOCYTES NFR BLD AUTO: 0.3 % — SIGNIFICANT CHANGE UP (ref 0–0.9)
INR BLD: 0.92 RATIO — SIGNIFICANT CHANGE UP (ref 0.85–1.18)
INR BLD: 0.92 RATIO — SIGNIFICANT CHANGE UP (ref 0.85–1.18)
LIDOCAIN IGE QN: 19 U/L — SIGNIFICANT CHANGE UP (ref 7–60)
LIDOCAIN IGE QN: 19 U/L — SIGNIFICANT CHANGE UP (ref 7–60)
LYMPHOCYTES # BLD AUTO: 1.83 K/UL — SIGNIFICANT CHANGE UP (ref 1–3.3)
LYMPHOCYTES # BLD AUTO: 1.83 K/UL — SIGNIFICANT CHANGE UP (ref 1–3.3)
LYMPHOCYTES # BLD AUTO: 26.1 % — SIGNIFICANT CHANGE UP (ref 13–44)
LYMPHOCYTES # BLD AUTO: 26.1 % — SIGNIFICANT CHANGE UP (ref 13–44)
MAGNESIUM SERPL-MCNC: 2.1 MG/DL — SIGNIFICANT CHANGE UP (ref 1.6–2.6)
MAGNESIUM SERPL-MCNC: 2.1 MG/DL — SIGNIFICANT CHANGE UP (ref 1.6–2.6)
MCHC RBC-ENTMCNC: 26.7 PG — LOW (ref 27–34)
MCHC RBC-ENTMCNC: 26.7 PG — LOW (ref 27–34)
MCHC RBC-ENTMCNC: 32.4 GM/DL — SIGNIFICANT CHANGE UP (ref 32–36)
MCHC RBC-ENTMCNC: 32.4 GM/DL — SIGNIFICANT CHANGE UP (ref 32–36)
MCV RBC AUTO: 82.4 FL — SIGNIFICANT CHANGE UP (ref 80–100)
MCV RBC AUTO: 82.4 FL — SIGNIFICANT CHANGE UP (ref 80–100)
MONOCYTES # BLD AUTO: 0.48 K/UL — SIGNIFICANT CHANGE UP (ref 0–0.9)
MONOCYTES # BLD AUTO: 0.48 K/UL — SIGNIFICANT CHANGE UP (ref 0–0.9)
MONOCYTES NFR BLD AUTO: 6.9 % — SIGNIFICANT CHANGE UP (ref 2–14)
MONOCYTES NFR BLD AUTO: 6.9 % — SIGNIFICANT CHANGE UP (ref 2–14)
NEUTROPHILS # BLD AUTO: 4.34 K/UL — SIGNIFICANT CHANGE UP (ref 1.8–7.4)
NEUTROPHILS # BLD AUTO: 4.34 K/UL — SIGNIFICANT CHANGE UP (ref 1.8–7.4)
NEUTROPHILS NFR BLD AUTO: 61.9 % — SIGNIFICANT CHANGE UP (ref 43–77)
NEUTROPHILS NFR BLD AUTO: 61.9 % — SIGNIFICANT CHANGE UP (ref 43–77)
NRBC # BLD: 0 /100 WBCS — SIGNIFICANT CHANGE UP (ref 0–0)
NRBC # BLD: 0 /100 WBCS — SIGNIFICANT CHANGE UP (ref 0–0)
NRBC # FLD: 0 K/UL — SIGNIFICANT CHANGE UP (ref 0–0)
NRBC # FLD: 0 K/UL — SIGNIFICANT CHANGE UP (ref 0–0)
NT-PROBNP SERPL-SCNC: 538 PG/ML — HIGH
NT-PROBNP SERPL-SCNC: 538 PG/ML — HIGH
PLATELET # BLD AUTO: 242 K/UL — SIGNIFICANT CHANGE UP (ref 150–400)
PLATELET # BLD AUTO: 242 K/UL — SIGNIFICANT CHANGE UP (ref 150–400)
POTASSIUM SERPL-MCNC: 4.2 MMOL/L — SIGNIFICANT CHANGE UP (ref 3.5–5.3)
POTASSIUM SERPL-MCNC: 4.2 MMOL/L — SIGNIFICANT CHANGE UP (ref 3.5–5.3)
POTASSIUM SERPL-SCNC: 4.2 MMOL/L — SIGNIFICANT CHANGE UP (ref 3.5–5.3)
POTASSIUM SERPL-SCNC: 4.2 MMOL/L — SIGNIFICANT CHANGE UP (ref 3.5–5.3)
PROT SERPL-MCNC: 7 G/DL — SIGNIFICANT CHANGE UP (ref 6–8.3)
PROT SERPL-MCNC: 7 G/DL — SIGNIFICANT CHANGE UP (ref 6–8.3)
PROTHROM AB SERPL-ACNC: 10.3 SEC — SIGNIFICANT CHANGE UP (ref 9.5–13)
PROTHROM AB SERPL-ACNC: 10.3 SEC — SIGNIFICANT CHANGE UP (ref 9.5–13)
RBC # BLD: 4.42 M/UL — SIGNIFICANT CHANGE UP (ref 3.8–5.2)
RBC # BLD: 4.42 M/UL — SIGNIFICANT CHANGE UP (ref 3.8–5.2)
RBC # FLD: 13.6 % — SIGNIFICANT CHANGE UP (ref 10.3–14.5)
RBC # FLD: 13.6 % — SIGNIFICANT CHANGE UP (ref 10.3–14.5)
SODIUM SERPL-SCNC: 141 MMOL/L — SIGNIFICANT CHANGE UP (ref 135–145)
SODIUM SERPL-SCNC: 141 MMOL/L — SIGNIFICANT CHANGE UP (ref 135–145)
TROPONIN T, HIGH SENSITIVITY RESULT: 10 NG/L — SIGNIFICANT CHANGE UP
TROPONIN T, HIGH SENSITIVITY RESULT: 10 NG/L — SIGNIFICANT CHANGE UP
TROPONIN T, HIGH SENSITIVITY RESULT: 11 NG/L — SIGNIFICANT CHANGE UP
TROPONIN T, HIGH SENSITIVITY RESULT: 11 NG/L — SIGNIFICANT CHANGE UP
WBC # BLD: 7 K/UL — SIGNIFICANT CHANGE UP (ref 3.8–10.5)
WBC # BLD: 7 K/UL — SIGNIFICANT CHANGE UP (ref 3.8–10.5)
WBC # FLD AUTO: 7 K/UL — SIGNIFICANT CHANGE UP (ref 3.8–10.5)
WBC # FLD AUTO: 7 K/UL — SIGNIFICANT CHANGE UP (ref 3.8–10.5)

## 2023-10-18 PROCEDURE — 99223 1ST HOSP IP/OBS HIGH 75: CPT

## 2023-10-18 PROCEDURE — 99285 EMERGENCY DEPT VISIT HI MDM: CPT

## 2023-10-18 PROCEDURE — 71045 X-RAY EXAM CHEST 1 VIEW: CPT | Mod: 26

## 2023-10-18 RX ORDER — NIFEDIPINE 30 MG
30 TABLET, EXTENDED RELEASE 24 HR ORAL DAILY
Refills: 0 | Status: DISCONTINUED | OUTPATIENT
Start: 2023-10-18 | End: 2023-10-19

## 2023-10-18 RX ORDER — VALSARTAN 80 MG/1
1 TABLET ORAL
Qty: 0 | Refills: 0 | DISCHARGE

## 2023-10-18 RX ORDER — OMEPRAZOLE 10 MG/1
1 CAPSULE, DELAYED RELEASE ORAL
Qty: 0 | Refills: 0 | DISCHARGE

## 2023-10-18 RX ORDER — ATORVASTATIN CALCIUM 80 MG/1
1 TABLET, FILM COATED ORAL
Qty: 0 | Refills: 0 | DISCHARGE

## 2023-10-18 RX ORDER — NIFEDIPINE 30 MG
1 TABLET, EXTENDED RELEASE 24 HR ORAL
Refills: 0 | DISCHARGE

## 2023-10-18 RX ORDER — VALSARTAN 80 MG/1
160 TABLET ORAL DAILY
Refills: 0 | Status: DISCONTINUED | OUTPATIENT
Start: 2023-10-18 | End: 2023-10-19

## 2023-10-18 RX ORDER — CARVEDILOL PHOSPHATE 80 MG/1
12.5 CAPSULE, EXTENDED RELEASE ORAL EVERY 12 HOURS
Refills: 0 | Status: DISCONTINUED | OUTPATIENT
Start: 2023-10-18 | End: 2023-10-19

## 2023-10-18 RX ORDER — HYDRALAZINE HCL 50 MG
25 TABLET ORAL THREE TIMES A DAY
Refills: 0 | Status: DISCONTINUED | OUTPATIENT
Start: 2023-10-18 | End: 2023-10-19

## 2023-10-18 RX ORDER — DONEPEZIL HYDROCHLORIDE 10 MG/1
5 TABLET, FILM COATED ORAL AT BEDTIME
Refills: 0 | Status: DISCONTINUED | OUTPATIENT
Start: 2023-10-18 | End: 2023-10-19

## 2023-10-18 RX ORDER — PANTOPRAZOLE SODIUM 20 MG/1
40 TABLET, DELAYED RELEASE ORAL
Refills: 0 | Status: DISCONTINUED | OUTPATIENT
Start: 2023-10-18 | End: 2023-10-19

## 2023-10-18 RX ORDER — ENOXAPARIN SODIUM 100 MG/ML
40 INJECTION SUBCUTANEOUS EVERY 24 HOURS
Refills: 0 | Status: DISCONTINUED | OUTPATIENT
Start: 2023-10-18 | End: 2023-10-19

## 2023-10-18 RX ORDER — ASPIRIN/CALCIUM CARB/MAGNESIUM 324 MG
162 TABLET ORAL ONCE
Refills: 0 | Status: COMPLETED | OUTPATIENT
Start: 2023-10-18 | End: 2023-10-18

## 2023-10-18 RX ORDER — DONEPEZIL HYDROCHLORIDE 10 MG/1
1 TABLET, FILM COATED ORAL
Qty: 0 | Refills: 0 | DISCHARGE

## 2023-10-18 RX ORDER — ACETAMINOPHEN 500 MG
650 TABLET ORAL EVERY 6 HOURS
Refills: 0 | Status: DISCONTINUED | OUTPATIENT
Start: 2023-10-18 | End: 2023-10-19

## 2023-10-18 RX ORDER — HYDRALAZINE HCL 50 MG
1 TABLET ORAL
Qty: 0 | Refills: 0 | DISCHARGE

## 2023-10-18 RX ORDER — VALSARTAN 80 MG/1
1 TABLET ORAL
Refills: 0 | DISCHARGE

## 2023-10-18 RX ORDER — ATORVASTATIN CALCIUM 80 MG/1
20 TABLET, FILM COATED ORAL AT BEDTIME
Refills: 0 | Status: DISCONTINUED | OUTPATIENT
Start: 2023-10-18 | End: 2023-10-19

## 2023-10-18 RX ADMIN — ATORVASTATIN CALCIUM 20 MILLIGRAM(S): 80 TABLET, FILM COATED ORAL at 22:30

## 2023-10-18 RX ADMIN — CARVEDILOL PHOSPHATE 12.5 MILLIGRAM(S): 80 CAPSULE, EXTENDED RELEASE ORAL at 22:27

## 2023-10-18 RX ADMIN — Medication 25 MILLIGRAM(S): at 22:27

## 2023-10-18 NOTE — ED PROVIDER NOTE - CLINICAL SUMMARY MEDICAL DECISION MAKING FREE TEXT BOX
72 yo extensive cardiac history, presents for chest pain radiating to jaw that started this morning, not associated with headache dizziness, focal weakness, or shortness of breath. exam benign and vitals on arrival within normal limits. has stress test planned for monday, but given high risk chest pain and sent in by cardiologist will keep her in CDU for stress. pt neuro exam non focal, had transient episode of dizziness when going to the bathroom which self resolved, no need for CT imaging at this point.

## 2023-10-18 NOTE — H&P ADULT - HISTORY OF PRESENT ILLNESS
Pt is a 72 y/o Chinese  female  PMH HTN, HLD, moderate LV dysfunction (EF 40-45% 2021) from a non-ischemic cardiomyopathy (non-obstructive CAD on cath 9/2020), LBBB presents today for chest pain radiating to arm.    In ED, vitals T, HR, BP, RR, O2 sat  Labs significant for  EKG personally reviewed  CXR:  Imaging:  ED management: Pt is a 70 y/o  female  PMH HTN, HLD, moderate LV dysfunction (EF 40-45% 2021) from a non-ischemic cardiomyopathy (non-obstructive CAD on cath 9/2020), s/p MDT BIV PPM 12/2022, with normalization of LV function by last TTE 1/2023, presents today for chest pain radiating to arm. Pt states she has been having intermittent left sided chest pain, radiating into left arm for the past 3 days. Denies any relieving or exacerbating factors. She denies any SOB, palpitations, fever, chills, recent illness, abd pain, n/v/d. She states she did travel to [facility] 3 months ago. Pt was seen at outpatient cardiology office and was sent to hospital for stress and echo.     In ED, vitals T 98, HR 58, /76, RR 20, O2 sat 100% on RA  Labs w/o significant findings  EKG personally reviewed: a-paced  CXR:IMPRESSION:  Stable cardiomegaly.    No active pulmonary pathology.    Imaging:   ED management: received 324 mg asa en route by EMS. Cardiology evaluated

## 2023-10-18 NOTE — ED PROVIDER NOTE - PROGRESS NOTE DETAILS
sg pt still not having active chest pain. ekg seems to be switched precordial and limb leads, will get rpt. called cardiologist office dr cervantes and left voicemail spoke with cards fellow on call, but private cardiologist has not returned call yet

## 2023-10-18 NOTE — H&P ADULT - NSHPREVIEWOFSYSTEMS_GEN_ALL_CORE
REVIEW OF SYSTEMS:    CONSTITUTIONAL: No weakness, fevers or chills  EYES/ENT: No visual changes;  No vertigo or throat pain   NECK: No pain or stiffness  RESPIRATORY: No cough, wheezing, hemoptysis; No shortness of breath  CARDIOVASCULAR: + chest pain. No palpitations  GASTROINTESTINAL: No abdominal or epigastric pain. No nausea, vomiting, or hematemesis; No diarrhea or constipation. No melena or hematochezia.  GENITOURINARY: No dysuria, frequency or hematuria  NEUROLOGICAL: No numbness or weakness  SKIN: No itching, rashes

## 2023-10-18 NOTE — ED PROVIDER NOTE - PHYSICAL EXAMINATION
GENERAL: well appearing in no acute distress, non-toxic appearing  HEAD: normocephalic, atraumatic  CARDIAC: regular rate and rhythm, normal S1S2, no appreciable murmurs, 2+ pulses in UE/LE b/l  PULM: normal breath sounds, clear to ascultation bilaterally, no rales, rhonchi, wheezing  GI: abdomen nondistended, soft, nontender, no guarding, rebound tenderness  NEURO: no focal motor or sensory deficits, CN2-12 intact, normal speech, normal gait, AAOx3  MSK: no peripheral edema,

## 2023-10-18 NOTE — CONSULT NOTE ADULT - NS ATTEND AMEND GEN_ALL_CORE FT
Patient seen and examined. Agree with plan as detailed in PA/NP Note.     check echo and stress    Saige Phillips MD  Pager: [phone]

## 2023-10-18 NOTE — H&P ADULT - PROBLEM SELECTOR PLAN 3
hx of nonobstructive CAD on cath 9/2020. Pt states currently not on asa  - c/w atorvastatin  - f/u cardiology recs

## 2023-10-18 NOTE — ED ADULT TRIAGE NOTE - CHIEF COMPLAINT QUOTE
sent by Cardiology office for CP rad. to left arm and jaw pain started this AM, as pt was in MD office to return her Holter monitor.   A stress test was schedule for this past Monday but needs new schedule.  ASA and Nitro given by EMS prior to arrival.    IV to left AC 20g angio cath in place.   Hx. PPM,

## 2023-10-18 NOTE — CONSULT NOTE ADULT - SUBJECTIVE AND OBJECTIVE BOX
date of consult 10/18/23    HISTORY OF PRESENT ILLNESS: HPI:    73 year old female with PMH who was dx with NICM 11/2021 (EF 40-45%), with no obs CAD by cath 9/2020, s/p MDT BIV PPM 12/2022, with normalization of LV function by last TTE 1/2023 presents with chest pain and Shortness of breath.  She is scheduled for a NST in the office next week.  Denies fever, chills, N/V, LE edema, palps or syncope.      PAST MEDICAL & SURGICAL HISTORY:  HTN (Hypertension)      Vertigo      CAD (coronary artery disease)      Mixed hyperlipidemia      LBBB (left bundle branch block)      Sinus bradycardia      H/O cardiac catheterization  Normal. 2007.        MEDICATIONS:  Coreg 12.5 mg tablet 1 TABLET BID  atorvastatin 20 mg tablet TABLET QHS  donepezil 5 mg daily.  hydralazine 25 mg tablet 1 TABLET TID  valsartan 160 mg tablet 1 TABLET DAILY        Allergies  amoxicillin (Rash)        FAMILY HISTORY:  Family history of hypertension in father    Family history of diabetes mellitus in father    Family history of ischemic heart disease    Family history of breast cancer (Sibling)    Family history of malignant neoplasm of uterus (Sibling)      Noncontributory for premature coronary disease or sudden cardiac death    SOCIAL HISTORY:    [x ] Non-smoker  [ ] Smoker  [ ] Alcohol    FLU VACCINE THIS YEAR STARTS IN AUGUST:  [ ] Yes    [ ] No    IF OVER 65 HAVE YOU EVER HAD A PNA VACCINE:  [ ] Yes    [ ] No       [ ] N/A      REVIEW OF SYSTEMS:  [ x]chest pain  [  ]shortness of breath  [  ]palpitations  [  ]syncope  [ ]near syncope [ ]upper extremity weakness   [ ] lower extremity weakness  [  ]diplopia  [  ]altered mental status   [  ]fevers  [ ]chills [ ]nausea  [ ]vomiting  [  ]dysphagia    [ ]abdominal pain  [ ]melena  [ ]BRBPR    [  ]epistaxis  [  ]rash    [ ]lower extremity edema        [ x] All others negative	  [ ] Unable to obtain      LABS:	 	    CARDIAC MARKERS:    TROP T 10                          11.8   7.00  )-----------( 242      ( 18 Oct 2023 14:25 )             36.4     Hb Trend: 11.8<--    10-18    141  |  105  |  12  ----------------------------<  94  4.2   |  27  |  0.63    Ca    9.2      18 Oct 2023 14:25  Mg     2.10     10-18    TPro  7.0  /  Alb  3.8  /  TBili  0.4  /  DBili  x   /  AST  15  /  ALT  12  /  AlkPhos  114  10-18    Creatinine Trend: 0.63<--    Coags:  PT/INR - ( 18 Oct 2023 14:25 )   PT: 10.3 sec;   INR: 0.92 ratio         PTT - ( 18 Oct 2023 14:25 )  PTT:32.3 sec      PHYSICAL EXAM:  T(C): 36.7 (10-18-23 @ 14:33), Max: 36.7 (10-18-23 @ 12:40)  HR: 60 (10-18-23 @ 14:33) (58 - 60)  BP: 185/71 (10-18-23 @ 14:33) (158/76 - 185/71)  RR: 18 (10-18-23 @ 14:33) (18 - 20)  SpO2: 99% (10-18-23 @ 14:33) (99% - 100%)  Wt(kg): --     I&O's Summary      Gen: Appears well in NAD  HEENT:  (-)icterus (-)pallor  CV: N S1 S2 1/6 DORIS (+)2 Pulses B/l  Resp:  Clear to ausculatation B/L, normal effort  GI: (+) BS Soft, NT, ND  Lymph:  (-)Edema, (-)obvious lymphadenopathy  Skin: Warm to touch, Normal turgor  Psych: Appropriate mood and affect        ECG:  	NSR     < from: Xray Chest 1 View- PORTABLE-Urgent (10.18.23 @ 15:20) >  IMPRESSION:  Stable cardiomegaly.    No active pulmonary pathology.    < end of copied text >      ASSESSMENT/PLAN: 73 year old female with PMH who was dx with NICM 11/2021 (EF 40-45%), with no obs CAD by cath 9/2020, s/p MDT BIV PPM 12/2022, with normalization of LV function by last TTE 1/2023 presents with chest pain and Shortness of breath.     --admit to tele, Dr Saige Phillips  --trend Trop T  --Not in clinical CHF  --Check TTE and NST tomorrow  --recent PPM interrogation 9/2023 demonstrated normal device function  --cont home BP meds as listed    Eliana [facility]  [phone]

## 2023-10-18 NOTE — CHART NOTE - NSCHARTNOTEFT_GEN_A_CORE
patient sent in from [facility]    please admit to tele under Dr Saige [facility]  [phone]

## 2023-10-18 NOTE — ED ADULT NURSE NOTE - NSFALLRISKINTERV_ED_ALL_ED

## 2023-10-18 NOTE — ED ADULT NURSE REASSESSMENT NOTE - NS ED NURSE REASSESS COMMENT FT1
Patient resting in stretcher. Offers no complaints and does not endorse pain at this time. RR equal and unlabored. Normal sinus rhythm noted on cardiac monitor. Ambulatory to bathroom. Safety measures maintained, care plan followed, awaiting bed placement at this time. Patient resting in stretcher. Offers no complaints and does not endorse pain at this time. RR equal and unlabored. Paced rhythm noted on cardiac monitor. Ambulatory to bathroom. Safety measures maintained, care plan followed, awaiting bed placement at this time.

## 2023-10-18 NOTE — H&P ADULT - NSHPLABSRESULTS_GEN_ALL_CORE
.  LABS:                         11.8   7.00  )-----------( 242      ( 18 Oct 2023 14:25 )             36.4     10-18    141  |  105  |  12  ----------------------------<  94  4.2   |  27  |  0.63    Ca    9.2      18 Oct 2023 14:25  Mg     2.10     10-18    TPro  7.0  /  Alb  3.8  /  TBili  0.4  /  DBili  x   /  AST  15  /  ALT  12  /  AlkPhos  114  10-18    PT/INR - ( 18 Oct 2023 14:25 )   PT: 10.3 sec;   INR: 0.92 ratio         PTT - ( 18 Oct 2023 14:25 )  PTT:32.3 sec  Urinalysis Basic - ( 18 Oct 2023 14:25 )    Color: x / Appearance: x / SG: x / pH: x  Gluc: 94 mg/dL / Ketone: x  / Bili: x / Urobili: x   Blood: x / Protein: x / Nitrite: x   Leuk Esterase: x / RBC: x / WBC x   Sq Epi: x / Non Sq Epi: x / Bacteria: x      CARDIAC MARKERS ( 18 Oct 2023 14:25 )  x     / x     / 47 U/L / x     / 1.6 ng/mL            RADIOLOGY, EKG & ADDITIONAL TESTS: Reviewed.

## 2023-10-18 NOTE — H&P ADULT - ASSESSMENT
Pt is a 70 y/o [place]ive[place]  female  PMH HTN, HLD, moderate LV dysfunction (EF 40-45% 2021) from a non-ischemic cardiomyopathy (non-obstructive CAD on cath 9/2020), LBBB presents today for chest pain radiating to arm. Pt is a 70 y/o  female  PMH HTN, HLD, moderate LV dysfunction (EF 40-45% 2021) from a non-ischemic cardiomyopathy (non-obstructive CAD on cath 9/2020), s/p MDT BIV PPM 12/2022, with normalization of LV function by last TTE 1/2023, presents today for chest pain radiating to arm. Pt admitted for further evaluation.

## 2023-10-18 NOTE — ED ADULT NURSE NOTE - OBJECTIVE STATEMENT
Patient received to [place] [id number], A&O x 4, ambulatory at baseline. PMHx cancer, unable to specify type, HTN, HLD. Pt sent to hospital from PCP for chest pain radiating to left side jaw and arm starting this morning. Pt brought in by ambulance, states "they gave me 3-4 Aspirin pills under my tongue that took away the pain." Endorses slight tingling to the left arm and 3/10 pain. Denies chest pain, SOB, blurry vision, n/v/d, numbness and tingling to the LE at this time. Reports not taking blood pressure medications this AM. States she experiences similar symptoms when her blood pressure is high. Pt placed on cardiac monitor, normal sinus rhythm noted, HR 60, /71. Safety measures maintained, awaiting lab results and further orders at this time. Patient received to [place] [id number], A&O x 4, ambulatory, Czech speaking. PMHx cancer, unable to specify type, HTN, HLD. Pt sent to hospital from PCP for chest pain radiating to left side jaw and arm starting this morning. Pt brought in by ambulance, states "they gave me 3-4 Aspirin pills under my tongue that took away the pain." Endorses slight tingling to the left arm and 3/10 pain. Denies chest pain, SOB, blurry vision, n/v/d, numbness and tingling to the LE at this time. Reports not taking blood pressure medications this AM. States she experiences similar symptoms when her blood pressure is high. Pt placed on cardiac monitor, normal sinus rhythm noted, HR 60, /71. 20G IV placed in R AC Safety measures maintained, awaiting lab results and further orders at this time. Patient received to [place] [id number], A&O x 4, ambulatory, Belarusian speaking. PMHx cancer, unable to specify type, HTN, HLD. Pt sent to hospital from PCP for chest pain radiating to left side jaw and arm starting this morning. Pt brought in by ambulance, states "they gave me 3-4 Aspirin pills under my tongue that took away the pain." Endorses slight tingling to the left arm and 3/10 pain. Denies chest pain, SOB, blurry vision, n/v/d, numbness and tingling to the LE at this time. Reports not taking blood pressure medications this AM. States she experiences similar symptoms when her blood pressure is high. Pt placed on cardiac monitor, paced rhythm noted, HR 60, /71. 20G IV placed in R AC Safety measures maintained, awaiting lab results and further orders at this time.

## 2023-10-18 NOTE — H&P ADULT - NSHPPHYSICALEXAM_GEN_ALL_CORE
VITAL SIGNS:  T(C): 36.9 (10-18-23 @ 20:16), Max: 36.9 (10-18-23 @ 20:16)  T(F): 98.5 (10-18-23 @ 20:16), Max: 98.5 (10-18-23 @ 20:16)  HR: 60 (10-18-23 @ 20:16) (58 - 60)  BP: 189/79 (10-18-23 @ 20:16) (158/76 - 189/79)  BP(mean): --  RR: 12 (10-18-23 @ 20:16) (12 - 20)  SpO2: 99% (10-18-23 @ 20:16) (99% - 100%)  Wt(kg): --    PHYSICAL EXAM:    Constitutional: resting comfortably in bed; NAD  Head: NC/AT  Eyes: PERRL, EOMI, anicteric sclera  ENT: no nasal discharge; uvula midline, no oropharyngeal erythema or exudates; MMM  Neck: supple  Respiratory: CTA B/L; no W/R/R, no retractions  Cardiac: +S1/S2; RRR; no M/R/G  Gastrointestinal: abdomen soft, NT/ND; no rebound or guarding; +BSx4  Back: spine midline, no bony tenderness  Extremities: WWP, no clubbing or cyanosis; no peripheral edema  Musculoskeletal: NROM x4; no joint swelling, tenderness or erythema  Vascular: distal pulses intact  Dermatologic: skin warm, dry and intact; no rashes  Lymphatic: no submandibular or cervical LAD  Neurologic: AAOx3; moves all 4 extremities  Psychiatric: affect and characteristics of appearance, verbalizations, behaviors are appropriate VITAL SIGNS:  T(C): 36.9 (10-18-23 @ 20:16), Max: 36.9 (10-18-23 @ 20:16)  T(F): 98.5 (10-18-23 @ 20:16), Max: 98.5 (10-18-23 @ 20:16)  HR: 60 (10-18-23 @ 20:16) (58 - 60)  BP: 189/79 (10-18-23 @ 20:16) (158/76 - 189/79)  BP(mean): --  RR: 12 (10-18-23 @ 20:16) (12 - 20)  SpO2: 99% (10-18-23 @ 20:16) (99% - 100%)  Wt(kg): --    PHYSICAL EXAM:    Constitutional: resting comfortably in bed; NAD  Head: NC/AT  Eyes: PERRL, EOMI, anicteric sclera  ENT: no nasal discharge; uvula midline, no oropharyngeal erythema or exudates; MMM  Neck: supple  Respiratory: CTA B/L; no W/R/R, no retractions  Cardiac: +S1/S2; RRR; no M/R/G  Gastrointestinal: abdomen soft, NT/ND; no rebound or guarding; +BSx4  Extremities: WWP, no clubbing or cyanosis; no peripheral edema  Musculoskeletal: NROM x4; no joint swelling, tenderness or erythema  Vascular: distal pulses intact  Dermatologic: skin warm, dry and intact; no rashes  Lymphatic: no submandibular or cervical LAD  Neurologic: AAOx3; moves all 4 extremities  Psychiatric: affect and characteristics of appearance, verbalizations, behaviors are appropriate

## 2023-10-18 NOTE — H&P ADULT - PROBLEM SELECTOR PLAN 1
Pt presenting w/ left sided chest pain x3 days. Trop neg x2. Pt seen and evaluated by cardiology and planned for nuclear stress and TTE  - f/u nuclear stress  - f/u TTE  - f/u cardiology recs  - c/w tele

## 2023-10-18 NOTE — ED PROVIDER NOTE - OBJECTIVE STATEMENT
72 y/o [place]  female  PMH HTN, HLD, moderate LV dysfunction (EF 40-45% 2021) from a non-ischemic cardiomyopathy (non-obstructive CAD on cath 9/2020), LBBB presents today for chest pain radiating to arm. A stress test was schedule for this past Monday but needs new schedule.  ASA and Nitro given by EMS prior to arrival. had an episode of transient dizziness while walking to the bathroom that resolved on its own. on arrival denies dizziness, change in vision, chest pain, shortness of breath, focal weakness, n/v, leg swelling. non focal neuro exam.

## 2023-10-18 NOTE — ED PROVIDER NOTE - ATTENDING CONTRIBUTION TO CARE
Attending Statement: I have personally seen and examined this patient. I have fully participated in the care of this patient. I have reviewed all pertinent clinical information, including history physical exam, plan and the Resident's note and agree except as noted  71yoF PMH HTN, HLD, moderate LV dysfunction (EF 40-45% 2021) from a non-ischemic cardiomyopathy (non-obstructive CAD on cath 9/2020), [facility]   Cardiology office for chest pain.  Patient endorsing intermittent midsternal chest tightness that radiates down the left arm and into the jaw, nonpositional, nonpleuritic, associated with shortness of breath.  Patient was given a nitroglycerin this morning and when she stood up to use the bathroom was lightheaded that lasted for about 1 minute.  No longer feeling dizzy or lightheaded at this time.  No nausea no vomiting.  No abdominal pain.  Some paresthesias of both feet and hands has been admitted for quite some time.  No new focal weakness or numbness..  Currently patient denies chest pain.  Vital signs noted blood pressure 158/76 heart rate 58 respiratory pulse ox 100% patient laying in bed ANO x3 nontoxic-appearing/no work of breathing no retractions not requiring oxygen.  Soft nontender abdomen.no pedal edema bl EKG, labs, chest x-ray, cardiac monitor, admission for high risk chest pain.  Patient aware of plan  plan

## 2023-10-18 NOTE — H&P ADULT - TIME BILLING
I have spent a total of greater than 78 minutes time spent to prepare to see the patient, obtaining and reviewing history, physical examination, explaining the diagnosis, prognosis and treatment plan with the patient/family/caregiver. I also have spent the time ordering studies and testing, interpreting results, medicine reconciliation, subspecialty consultation and documentation as above.

## 2023-10-18 NOTE — H&P ADULT - PROBLEM SELECTOR PLAN 4
Pt w/ hx of moderate LV dysfunction (EF 40-45% 2021) from a non-ischemic cardiomyopathy (non-obstructive CAD on cath 9/2020), s/p MDT BIV PPM 12/2022, with normalization of LV function by last TTE 1/2023. Euvolemic on exam  - f/u TTE

## 2023-10-19 ENCOUNTER — APPOINTMENT (OUTPATIENT)
Dept: HEMATOLOGY ONCOLOGY | Facility: CLINIC | Age: 73
End: 2023-10-19

## 2023-10-19 ENCOUNTER — TRANSCRIPTION ENCOUNTER (OUTPATIENT)
Age: 73
End: 2023-10-19

## 2023-10-19 VITALS
RESPIRATION RATE: 18 BRPM | DIASTOLIC BLOOD PRESSURE: 63 MMHG | TEMPERATURE: 98 F | HEART RATE: 59 BPM | SYSTOLIC BLOOD PRESSURE: 147 MMHG | OXYGEN SATURATION: 97 %

## 2023-10-19 RX ADMIN — Medication 25 MILLIGRAM(S): at 06:50

## 2023-10-19 RX ADMIN — Medication 25 MILLIGRAM(S): at 14:17

## 2023-10-19 RX ADMIN — VALSARTAN 160 MILLIGRAM(S): 80 TABLET ORAL at 06:49

## 2023-10-19 RX ADMIN — PANTOPRAZOLE SODIUM 40 MILLIGRAM(S): 20 TABLET, DELAYED RELEASE ORAL at 06:49

## 2023-10-19 RX ADMIN — CARVEDILOL PHOSPHATE 12.5 MILLIGRAM(S): 80 CAPSULE, EXTENDED RELEASE ORAL at 06:49

## 2023-10-19 NOTE — DISCHARGE NOTE NURSING/CASE MANAGEMENT/SOCIAL WORK - NSDCVIVACCINE_GEN_ALL_CORE_FT
Returned patient call and informed of results.   influenza, injectable, quadrivalent, preservative free; 06-Oct-2016 14:31; Francie Herbert (RN); [employer]; [id number]; IntraMuscular; Deltoid Left.; 0.5 milliLiter(s); VIS (VIS Published: 07-Aug-2015, VIS Presented: 06-Oct-2016);

## 2023-10-19 NOTE — DISCHARGE NOTE PROVIDER - HOSPITAL COURSE
ASSESSMENT/PLAN: 73 year old female with PMH who was dx with NICM 11/2021 (EF 40-45%), with no obs CAD by cath 9/2020, s/p MDT BIV PPM 12/2022, with normalization of LV function by last TTE 1/2023 presents with chest pain and Shortness of breath.     --ACS ruled out  --Not in clinical CHF  --recent PPM interrogation 9/2023 demonstrated normal device function  --cont home BP meds as listed  --TTE with Normal LV function  --NST with no ischemia or infarct    F/U with Dr Travis 11/3 at 2pm, [phone]    Patient seen and evaluated. Reviewed discharge medications with patient and attending. All new medications requiring new prescriptions were sent to the pharmacy of patient's choice. Reviewed need for prescription for previous home medications and new prescriptions sent if requested. Medically cleared/stable for discharge as per Dr. Phillips on 10/19/23  with appropriate follow up. Patient understands and agrees with plan of care.

## 2023-10-19 NOTE — DISCHARGE NOTE PROVIDER - NSDCMRMEDTOKEN_GEN_ALL_CORE_FT
atorvastatin 20 mg oral tablet: 1 tab(s) orally once a day  carvedilol 12.5 mg oral tablet: 1 tab(s) orally every 12 hours  donepezil 5 mg oral tablet: 1 tab(s) orally once a day (at bedtime)  hydrALAZINE 25 mg oral tablet: 1 tab(s) orally 3 times a day  NIFEdipine 30 mg oral tablet, extended release: 1 tab(s) orally once a day  omeprazole 20 mg oral delayed release capsule: 1 cap(s) orally once a day  valsartan 160 mg oral tablet: 1 tab(s) orally once a day

## 2023-10-19 NOTE — DISCHARGE NOTE PROVIDER - NSDCACTIVITY_GEN_ALL_CORE
Problem: Respiratory Impairment - Respiratory Therapy 253  Intervention: Inhaled medication delivery  Intervention Status  progressing    Goal: Demonstrates optimal level of respiratory function 173  progressing       No heavy lifting/straining

## 2023-10-19 NOTE — DISCHARGE NOTE NURSING/CASE MANAGEMENT/SOCIAL WORK - NSDCPEFALRISK_GEN_ALL_CORE
For information on Fall & Injury Prevention, visit: [email or web address] OR  [email or web address] OR  https://www.cdc.gov/steadi/patient.html

## 2023-10-19 NOTE — PATIENT PROFILE ADULT - NS PRO AD NO ADVANCE DIRECTIVE
Patient is a 71 y/o man with history of ETOH abuse who presents with fall and found with endocarditis, developed ELIAN in-hospital shortly after undergoing cardiac catheterization. No

## 2023-10-19 NOTE — PROGRESS NOTE ADULT - SUBJECTIVE AND OBJECTIVE BOX
Date of service 10/19/23    chief complaint: chest pain    extended hpi: 73 year old female with PMH who was dx with NICM 11/2021 (EF 40-45%), with no obs CAD by cath 9/2020, s/p MDT BIV PPM 12/2022, with normalization of LV function by last TTE 1/2023 presents with chest pain and Shortness of breath.     no chest pain or SOB    Review of Systems:   Constitutional: [ ] fevers, [ ] chills.   Skin: [ ] dry skin. [ ] rashes.  Psychiatric: [ ] depression, [ ] anxiety.   Gastrointestinal: [ ] BRBPR, [ ] melena.   Neurological: [ ] confusion. [ ] seizures. [ ] shuffling gait.   Ears,Nose,Mouth and Throat: [ ] ear pain [ ] sore throat.   Eyes: [ ] diplopia.   Respiratory: [ ] hemoptysis. [ ] shortness of breath  Cardiovascular: See HPI above  Hematologic/Lymphatic: [ ] anemia. [ ] painful nodes. [ ] prolonged bleeding.   Genitourinary: [ ] hematuria. [ ] flank pain.   Endocrine: [ ] significant change in weight. [ ] intolerance to heat and cold.     Review of systems [ x] otherwise negative, [ ] otherwise unable to obtain    FH: no family history of sudden cardiac death in first degree relatives    SH: [ ] tobacco, [ ] alcohol, [ ] drugs    acetaminophen     Tablet .. 650 milliGRAM(s) Oral every 6 hours PRN  atorvastatin 20 milliGRAM(s) Oral at bedtime  carvedilol 12.5 milliGRAM(s) Oral every 12 hours  donepezil 5 milliGRAM(s) Oral at bedtime  enoxaparin Injectable 40 milliGRAM(s) SubCutaneous every 24 hours  hydrALAZINE 25 milliGRAM(s) Oral three times a day  NIFEdipine XL 30 milliGRAM(s) Oral daily  pantoprazole    Tablet 40 milliGRAM(s) Oral before breakfast  valsartan 160 milliGRAM(s) Oral daily                            11.8   7.00  )-----------( 242      ( 18 Oct 2023 14:25 )             36.4     141  |  105  |  12  ----------------------------<  94  4.2   |  27  |  0.63    Ca    9.2      18 Oct 2023 14:25  Mg     2.10     10-18    TPro  7.0  /  Alb  3.8  /  TBili  0.4  /  DBili  x   /  AST  15  /  ALT  12  /  AlkPhos  114  10-18      CARDIAC MARKERS ( 18 Oct 2023 21:35 )  x     / x     / 46 U/L / x     / 1.4 ng/mL  CARDIAC MARKERS ( 18 Oct 2023 14:25 )  x     / x     / 47 U/L / x     / 1.6 ng/mL      T(C): 36.9 (10-19-23 @ 12:47), Max: 36.9 (10-18-23 @ 20:16)  HR: 59 (10-19-23 @ 12:47) (59 - 62)  BP: 147/63 (10-19-23 @ 12:47) (147/63 - 196/76)  RR: 18 (10-19-23 @ 12:47) (12 - 18)  SpO2: 97% (10-19-23 @ 12:47) (97% - 100%)    General: Well nourished in no acute distress. Alert and Oriented * 3.   Head: Normocephalic and atraumatic.   Neck: No JVD. No bruits. Supple. Does not appear to be enlarged.   Cardiovascular: + S1,S2 ; RRR Soft systolic murmur at the left lower sternal border. No rubs noted.    Lungs: CTA b/l. No rhonchi, rales or wheezes.   Abdomen: + BS, soft. Non tender. Non distended. No rebound. No guarding.   Extremities: No clubbing/cyanosis/edema.   Neurologic: Moves all four extremities. Full range of motion.   Skin: Warm and moist. The patient's skin has normal elasticity and good skin turgor.   Psychiatric: Appropriate mood and affect.  Musculoskeletal: Normal range of motion, normal strength    DATA    < from: Xray Chest 1 View- PORTABLE-Urgent (10.18.23 @ 15:20) >  IMPRESSION:  Stable cardiomegaly.    No active pulmonary pathology.    < end of copied text >      < from: TTE Limited W or WO Ultrasound Enhancing Agent (10.19.23 @ 07:23) >  CONCLUSIONS:      1. Left ventricular cavity is normal. Left ventricular wall thickness is normal. Left ventricular systolic function is normal with an ejection fraction of 62 % by Obrien's method of disks. There are no regional wall motion abnormalities seen.   2. There is mild (grade 1) left ventricular diastolic dysfunction, with normal filling pressure.   3. Normal right ventricular cavity size and systolic function.   4. The left atrium is normal in size.   5. No significant valvular disease.  < end of copied text >    < from: Nuclear Stress Test-Exercise.. (10.19.23 @ 08:06) >  Conclusions:   1. Normal myocardial perfusion scan, with no evidence of infarction or inducible ischemia.   2. Normal left ventricular regional wall motion.   3. Qualitative Perfusion:      -.   4. The left ventricle is low normal in function and mildly enlarged in size. The left ventricular EF% during stress is 56 %. The stress end diastolic volume is 174 ml and systolic volume is 76 ml.   5. Arrhythmias: Occasional VPD occurred during rest, stress and recovery, was unaffected by stress.   6. Hypokinesis of the lateral wall.    < end of copied text >      ASSESSMENT/PLAN: 73 year old female with PMH who was dx with NICM 11/2021 (EF 40-45%), with no obs CAD by cath 9/2020, s/p MDT BIV PPM 12/2022, with normalization of LV function by last TTE 1/2023 presents with chest pain and Shortness of breath.     --ACS ruled out  --Not in clinical CHF  --recent PPM interrogation 9/2023 demonstrated normal device function  --cont home BP meds as listed  --TTE with Normal LV function  --NST with no ischemia or infarct    DC HOME today  F/U with Dr Travis 11/3 at 2pm, [phone]      [facility]  [phone]

## 2023-10-19 NOTE — DISCHARGE NOTE PROVIDER - CARE PROVIDER_API CALL
Annemarie Martinez  Geriatric Medicine  [address] [place] [id number]  Phone: [phone]  Fax: [phone]  Follow Up Time:

## 2023-10-19 NOTE — DISCHARGE NOTE NURSING/CASE MANAGEMENT/SOCIAL WORK - PATIENT PORTAL LINK FT
You can access the FollowMyHealth Patient Portal offered by [facility] by registering at the following website: [email or web address]. By joining [employer]’s FollowMyHealth portal, you will also be able to view your health information using other applications (apps) compatible with our system.

## 2023-10-19 NOTE — PATIENT PROFILE ADULT - FALL HARM RISK - HARM RISK INTERVENTIONS
Assistance with ambulation/Assistance OOB with selected safe patient handling equipment/Communicate Risk of Fall with Harm to all staff/Discuss with provider need for PT consult/Monitor gait and stability/Provide patient with walking aids - walker, cane, crutches/Reinforce activity limits and safety measures with patient and family/Reorient to person, place and time as needed/Review medications for side effects contributing to fall risk/Sit up slowly, dangle for a short time, stand at bedside before walking/Tailored Fall Risk Interventions/Toileting schedule using arm’s reach rule for commode and bathroom/Use of alarms - bed, chair and/or voice tab/Visual Cue: Yellow wristband and red socks/Bed in lowest position, wheels locked, appropriate side rails in place/Call bell, personal items and telephone in reach/Instruct patient to call for assistance before getting out of bed or chair/Non-slip footwear when patient is out of bed/[place] to call system/Physically safe environment - no spills, clutter or unnecessary equipment/Purposeful Proactive Rounding/Room/bathroom lighting operational, light cord in reach

## 2023-10-19 NOTE — DISCHARGE NOTE PROVIDER - NSDCCPCAREPLAN_GEN_ALL_CORE_FT
PRINCIPAL DISCHARGE DIAGNOSIS  Diagnosis: Chest pain  Assessment and Plan of Treatment: You came into the hospital with chest pain, this has since resolved, you had a stress test which was negative please follow up with the cardiologist on your scheduled appointment      SECONDARY DISCHARGE DIAGNOSES  Diagnosis: CAD (coronary artery disease)  Assessment and Plan of Treatment: Please continue home medications    Diagnosis: Systolic heart failure, chronic  Assessment and Plan of Treatment: Please continue home medications    Diagnosis: HTN (hypertension)  Assessment and Plan of Treatment: Continue blood pressure medication regimen as directed. Monitor for any visual changes, headaches or dizziness.  Monitor blood pressure regularly.  Follow up with your primary care provider for further management for high blood pressure.

## 2023-10-19 NOTE — PROGRESS NOTE ADULT - NS ATTEND AMEND GEN_ALL_CORE FT
Patient seen and examined. Agree with plan as detailed in PA/NP Note.     NST with no ischemia or infarct  D/c home with f/u with Dr. Briceno  TTE with recovered EF    Saige Phillips MD  Pager: [phone]

## 2023-10-27 DIAGNOSIS — L81.8 OTHER SPECIFIED DISORDERS OF PIGMENTATION: ICD-10-CM

## 2023-11-03 ENCOUNTER — APPOINTMENT (OUTPATIENT)
Dept: HEMATOLOGY ONCOLOGY | Facility: CLINIC | Age: 73
End: 2023-11-03

## 2023-11-24 ENCOUNTER — RESULT REVIEW (OUTPATIENT)
Age: 73
End: 2023-11-24

## 2023-11-24 ENCOUNTER — APPOINTMENT (OUTPATIENT)
Dept: HEMATOLOGY ONCOLOGY | Facility: CLINIC | Age: 73
End: 2023-11-24
Payer: MEDICARE

## 2023-11-24 VITALS
WEIGHT: 201.94 LBS | TEMPERATURE: 97 F | BODY MASS INDEX: 36.74 KG/M2 | RESPIRATION RATE: 16 BRPM | OXYGEN SATURATION: 98 % | SYSTOLIC BLOOD PRESSURE: 164 MMHG | HEART RATE: 60 BPM | DIASTOLIC BLOOD PRESSURE: 81 MMHG

## 2023-11-24 DIAGNOSIS — D59.10 AUTOIMMUNE HEMOLYTIC ANEMIA, UNSPECIFIED: ICD-10-CM

## 2023-11-24 LAB
BASOPHILS # BLD AUTO: 0.07 K/UL — SIGNIFICANT CHANGE UP (ref 0–0.2)
BASOPHILS # BLD AUTO: 0.07 K/UL — SIGNIFICANT CHANGE UP (ref 0–0.2)
BASOPHILS NFR BLD AUTO: 0.9 % — SIGNIFICANT CHANGE UP (ref 0–2)
BASOPHILS NFR BLD AUTO: 0.9 % — SIGNIFICANT CHANGE UP (ref 0–2)
EOSINOPHIL # BLD AUTO: 0.31 K/UL — SIGNIFICANT CHANGE UP (ref 0–0.5)
EOSINOPHIL # BLD AUTO: 0.31 K/UL — SIGNIFICANT CHANGE UP (ref 0–0.5)
EOSINOPHIL NFR BLD AUTO: 4 % — SIGNIFICANT CHANGE UP (ref 0–6)
EOSINOPHIL NFR BLD AUTO: 4 % — SIGNIFICANT CHANGE UP (ref 0–6)
HCT VFR BLD CALC: 38.7 % — SIGNIFICANT CHANGE UP (ref 34.5–45)
HCT VFR BLD CALC: 38.7 % — SIGNIFICANT CHANGE UP (ref 34.5–45)
HGB BLD-MCNC: 12.2 G/DL — SIGNIFICANT CHANGE UP (ref 11.5–15.5)
HGB BLD-MCNC: 12.2 G/DL — SIGNIFICANT CHANGE UP (ref 11.5–15.5)
IMM GRANULOCYTES NFR BLD AUTO: 0.3 % — SIGNIFICANT CHANGE UP (ref 0–0.9)
IMM GRANULOCYTES NFR BLD AUTO: 0.3 % — SIGNIFICANT CHANGE UP (ref 0–0.9)
LYMPHOCYTES # BLD AUTO: 1.62 K/UL — SIGNIFICANT CHANGE UP (ref 1–3.3)
LYMPHOCYTES # BLD AUTO: 1.62 K/UL — SIGNIFICANT CHANGE UP (ref 1–3.3)
LYMPHOCYTES # BLD AUTO: 21.1 % — SIGNIFICANT CHANGE UP (ref 13–44)
LYMPHOCYTES # BLD AUTO: 21.1 % — SIGNIFICANT CHANGE UP (ref 13–44)
MCHC RBC-ENTMCNC: 26.6 PG — LOW (ref 27–34)
MCHC RBC-ENTMCNC: 26.6 PG — LOW (ref 27–34)
MCHC RBC-ENTMCNC: 31.5 G/DL — LOW (ref 32–36)
MCHC RBC-ENTMCNC: 31.5 G/DL — LOW (ref 32–36)
MCV RBC AUTO: 84.3 FL — SIGNIFICANT CHANGE UP (ref 80–100)
MCV RBC AUTO: 84.3 FL — SIGNIFICANT CHANGE UP (ref 80–100)
MONOCYTES # BLD AUTO: 0.56 K/UL — SIGNIFICANT CHANGE UP (ref 0–0.9)
MONOCYTES # BLD AUTO: 0.56 K/UL — SIGNIFICANT CHANGE UP (ref 0–0.9)
MONOCYTES NFR BLD AUTO: 7.3 % — SIGNIFICANT CHANGE UP (ref 2–14)
MONOCYTES NFR BLD AUTO: 7.3 % — SIGNIFICANT CHANGE UP (ref 2–14)
NEUTROPHILS # BLD AUTO: 5.08 K/UL — SIGNIFICANT CHANGE UP (ref 1.8–7.4)
NEUTROPHILS # BLD AUTO: 5.08 K/UL — SIGNIFICANT CHANGE UP (ref 1.8–7.4)
NEUTROPHILS NFR BLD AUTO: 66.4 % — SIGNIFICANT CHANGE UP (ref 43–77)
NEUTROPHILS NFR BLD AUTO: 66.4 % — SIGNIFICANT CHANGE UP (ref 43–77)
NRBC # BLD: 0 /100 WBCS — SIGNIFICANT CHANGE UP (ref 0–0)
NRBC # BLD: 0 /100 WBCS — SIGNIFICANT CHANGE UP (ref 0–0)
PLATELET # BLD AUTO: 258 K/UL — SIGNIFICANT CHANGE UP (ref 150–400)
PLATELET # BLD AUTO: 258 K/UL — SIGNIFICANT CHANGE UP (ref 150–400)
RBC # BLD: 4.59 M/UL — SIGNIFICANT CHANGE UP (ref 3.8–5.2)
RBC # BLD: 4.59 M/UL — SIGNIFICANT CHANGE UP (ref 3.8–5.2)
RBC # FLD: 14.1 % — SIGNIFICANT CHANGE UP (ref 10.3–14.5)
RBC # FLD: 14.1 % — SIGNIFICANT CHANGE UP (ref 10.3–14.5)
RETICS #: 76.7 K/UL — SIGNIFICANT CHANGE UP (ref 25–125)
RETICS #: 76.7 K/UL — SIGNIFICANT CHANGE UP (ref 25–125)
RETICS/RBC NFR: 1.7 % — SIGNIFICANT CHANGE UP (ref 0.5–2.5)
RETICS/RBC NFR: 1.7 % — SIGNIFICANT CHANGE UP (ref 0.5–2.5)
WBC # BLD: 7.66 K/UL — SIGNIFICANT CHANGE UP (ref 3.8–10.5)
WBC # BLD: 7.66 K/UL — SIGNIFICANT CHANGE UP (ref 3.8–10.5)
WBC # FLD AUTO: 7.66 K/UL — SIGNIFICANT CHANGE UP (ref 3.8–10.5)
WBC # FLD AUTO: 7.66 K/UL — SIGNIFICANT CHANGE UP (ref 3.8–10.5)

## 2023-11-24 PROCEDURE — 99214 OFFICE O/P EST MOD 30 MIN: CPT

## 2023-11-27 LAB
ALBUMIN SERPL ELPH-MCNC: 3.9 G/DL
ALP BLD-CCNC: 113 U/L
ALT SERPL-CCNC: 21 U/L
ANION GAP SERPL CALC-SCNC: 10 MMOL/L
AST SERPL-CCNC: 26 U/L
BILIRUB SERPL-MCNC: 0.7 MG/DL
BUN SERPL-MCNC: 15 MG/DL
CALCIUM SERPL-MCNC: 9 MG/DL
CHLORIDE SERPL-SCNC: 104 MMOL/L
CO2 SERPL-SCNC: 26 MMOL/L
CREAT SERPL-MCNC: 0.77 MG/DL
EGFR: 80 ML/MIN/1.73M2
GLUCOSE SERPL-MCNC: 105 MG/DL
HAPTOGLOB SERPL-MCNC: 161 MG/DL
LDH SERPL-CCNC: 167 U/L
POTASSIUM SERPL-SCNC: 4.4 MMOL/L
PROT SERPL-MCNC: 6.9 G/DL
SODIUM SERPL-SCNC: 141 MMOL/L

## 2024-01-04 NOTE — PATIENT PROFILE ADULT - BRADEN NUTRITION
[de-identified] : Initial visit. Chief complaint is "coughing, stuffiness, blocked ears". She reports that she was in her otherwise state of fine health when approximately June 22, 2023 she developed a URI. This is progressed to an ongoing cough. She also reports pressure in her ears. Associated coughing and sneezing. She is a never smoker. No hemoptysis. (3) adequate

## 2024-01-28 NOTE — PROVIDER CONTACT NOTE (OTHER) - ASSESSMENT
Pt alert, no complaints of pain at this time. [FreeTextEntry1] : Gastroenterologist for a hepatologist notes were discussed with patient and daughter, Advice to see the gastroenterologist/hepatologist again and get the Workup. Patient is in good spirit advice patient to continue current management and get her albumin up.

## 2024-05-16 ENCOUNTER — OUTPATIENT (OUTPATIENT)
Dept: OUTPATIENT SERVICES | Facility: HOSPITAL | Age: 74
LOS: 1 days | Discharge: HOME | End: 2024-05-16

## 2024-05-16 DIAGNOSIS — Z98.890 OTHER SPECIFIED POSTPROCEDURAL STATES: Chronic | ICD-10-CM

## 2024-05-16 DIAGNOSIS — D64.9 ANEMIA, UNSPECIFIED: ICD-10-CM

## 2024-05-22 ENCOUNTER — APPOINTMENT (OUTPATIENT)
Dept: HEMATOLOGY ONCOLOGY | Facility: CLINIC | Age: 74
End: 2024-05-22

## 2024-07-09 ENCOUNTER — NON-APPOINTMENT (OUTPATIENT)
Age: 74
End: 2024-07-09

## 2024-07-10 ENCOUNTER — APPOINTMENT (OUTPATIENT)
Dept: HEMATOLOGY ONCOLOGY | Facility: CLINIC | Age: 74
End: 2024-07-10
Payer: MEDICARE

## 2024-07-10 ENCOUNTER — RESULT REVIEW (OUTPATIENT)
Age: 74
End: 2024-07-10

## 2024-07-10 VITALS
RESPIRATION RATE: 16 BRPM | SYSTOLIC BLOOD PRESSURE: 154 MMHG | BODY MASS INDEX: 36.9 KG/M2 | TEMPERATURE: 97.9 F | DIASTOLIC BLOOD PRESSURE: 77 MMHG | HEART RATE: 59 BPM | OXYGEN SATURATION: 97 % | WEIGHT: 202.8 LBS

## 2024-07-10 DIAGNOSIS — D59.10 AUTOIMMUNE HEMOLYTIC ANEMIA, UNSPECIFIED: ICD-10-CM

## 2024-07-10 LAB
BASOPHILS # BLD AUTO: 0.06 K/UL — SIGNIFICANT CHANGE UP (ref 0–0.2)
BASOPHILS NFR BLD AUTO: 0.9 % — SIGNIFICANT CHANGE UP (ref 0–2)
EOSINOPHIL # BLD AUTO: 0.29 K/UL — SIGNIFICANT CHANGE UP (ref 0–0.5)
EOSINOPHIL NFR BLD AUTO: 4.4 % — SIGNIFICANT CHANGE UP (ref 0–6)
HCT VFR BLD CALC: 37.1 % — SIGNIFICANT CHANGE UP (ref 34.5–45)
HGB BLD-MCNC: 11.8 G/DL — SIGNIFICANT CHANGE UP (ref 11.5–15.5)
IMM GRANULOCYTES NFR BLD AUTO: 0.3 % — SIGNIFICANT CHANGE UP (ref 0–0.9)
LYMPHOCYTES # BLD AUTO: 1.54 K/UL — SIGNIFICANT CHANGE UP (ref 1–3.3)
LYMPHOCYTES # BLD AUTO: 23.5 % — SIGNIFICANT CHANGE UP (ref 13–44)
MCHC RBC-ENTMCNC: 27.2 PG — SIGNIFICANT CHANGE UP (ref 27–34)
MCHC RBC-ENTMCNC: 31.8 G/DL — LOW (ref 32–36)
MCV RBC AUTO: 85.5 FL — SIGNIFICANT CHANGE UP (ref 80–100)
MONOCYTES # BLD AUTO: 0.52 K/UL — SIGNIFICANT CHANGE UP (ref 0–0.9)
MONOCYTES NFR BLD AUTO: 7.9 % — SIGNIFICANT CHANGE UP (ref 2–14)
NEUTROPHILS # BLD AUTO: 4.13 K/UL — SIGNIFICANT CHANGE UP (ref 1.8–7.4)
NEUTROPHILS NFR BLD AUTO: 63 % — SIGNIFICANT CHANGE UP (ref 43–77)
NRBC # BLD: 0 /100 WBCS — SIGNIFICANT CHANGE UP (ref 0–0)
PLATELET # BLD AUTO: 226 K/UL — SIGNIFICANT CHANGE UP (ref 150–400)
RBC # BLD: 4.34 M/UL — SIGNIFICANT CHANGE UP (ref 3.8–5.2)
RBC # FLD: 14 % — SIGNIFICANT CHANGE UP (ref 10.3–14.5)
RETICS #: 72.9 K/UL — SIGNIFICANT CHANGE UP (ref 25–125)
RETICS/RBC NFR: 1.7 % — SIGNIFICANT CHANGE UP (ref 0.5–2.5)
WBC # BLD: 6.56 K/UL — SIGNIFICANT CHANGE UP (ref 3.8–10.5)
WBC # FLD AUTO: 6.56 K/UL — SIGNIFICANT CHANGE UP (ref 3.8–10.5)

## 2024-07-10 PROCEDURE — 99213 OFFICE O/P EST LOW 20 MIN: CPT

## 2024-07-10 PROCEDURE — G2211 COMPLEX E/M VISIT ADD ON: CPT

## 2024-08-15 NOTE — ED PROVIDER NOTE - NSICDXPASTMEDICALHX_GEN_ALL_CORE_FT
PAST MEDICAL HISTORY:  CAD (coronary artery disease)     HTN (Hypertension)     LBBB (left bundle branch block)     Mixed hyperlipidemia     Sinus bradycardia     Vertigo      18

## 2024-10-23 NOTE — ED ADULT TRIAGE NOTE - PATIENT ON (OXYGEN DELIVERY METHOD)
room air Patient requests all Lab, Cardiology, and Radiology Results on their Discharge Instructions

## 2025-07-29 NOTE — PROGRESS NOTE ADULT - PROBLEM/PLAN-6
0700 0800 0900 1:00 PM 2:00PM 3:00PM 7:00PM 8:00PM 9:00PM      1 TABLET PANTAPROZOLE 1 TABLET tramadol IF NEEDED FOR PAIN  1 TABLET 5MG OXYCODONE IF PAIN NOT CONTROLLED BY tramadol 2 TABLETS 500 MG TYLENOL  (ACETAMINOPHEN) 1 TABLET OF tramadol AS NEEDED FOR PAIN MAY TAKE 1 TABLET 5 MG OXYCODONE FOR PAIN NOT RELIEVED BY TRAMADOL TAKE 2 500 MG TABLETS OF TYLENOL  (ACETAMINOPHEN) 1 TABLET 81 MG ASPIRIN MAY TAKE 1 TABLET 5 MG OXYCODONE FOR PAIN NOT RELIEVED BY TRAMADOL      1 TABLET 81 MG ASPIRIN        1 CASPULE OF CELEBREX OR 1 TABLET OF MELOXICAM TAKE 1 CAPSULE docusate sodium        2 TABLETS 500 MG Tylenol  (ACETAMINOPHEN)         MAY TAKE 1 TABLET OF tramadol AS NEEDED FOR PAIN        1 CAPSULE docusate sodium                                                           MEDICATION SCHEDULE         DISCHARGE ACTIVITY    TO BE Performed EVERY HOUR while awake    Quad sets - 5 reps, contract thigh muscle hard for 5 seconds, alternate with heel slides  Heel slides - 5 reps, hold 5 seconds at 90 degrees, alternate with Quad sets  Walk - 5-20 Steps   Ice - 20 minutes       OUTPATIENT MEDICATIONS    SCHEDULED:    -For Pain:    Tylenol (Acetaminophen) 500 mg: Take 2 tabs by mouth every 8 hours for pain.     Mobic (Meloxicam) 15 mg: Take 1 cap by mouth once daily with food in the evening.         -For Stomach Acid control:    Pantoprazole (Protonix) 20 mg: Take 1 tablet daily while taking aspirin to prevent stomach ulcers.     -To Prevent Constipation:    Colace (Docusate sodium) 100 mg: Take 1 cap by mouth twice daily while taking narcotics to avoid constipation.    Miralax (Polyethylene glycol): Mix 17 Grams with 8 oz. juice or water and take by mouth up to twice daily as needed     -To Prevent Blood Clots    Aspirin EC 81 mg: Take 1 tablet by mouth twice daily for 6 weeks         ONLY AS NEEDED:    -For Nausea:    Zofran (Ondansetron) 8mg:  take 1 tablet by mouth every 8 hours as needed for nausea    -Rescue pain 
DISPLAY PLAN FREE TEXT